# Patient Record
Sex: FEMALE | Race: WHITE | Employment: OTHER | URBAN - METROPOLITAN AREA
[De-identification: names, ages, dates, MRNs, and addresses within clinical notes are randomized per-mention and may not be internally consistent; named-entity substitution may affect disease eponyms.]

---

## 2017-01-23 ENCOUNTER — TELEPHONE (OUTPATIENT)
Dept: INTERNAL MEDICINE CLINIC | Facility: CLINIC | Age: 67
End: 2017-01-23

## 2017-01-23 ENCOUNTER — OFFICE VISIT (OUTPATIENT)
Dept: INTERNAL MEDICINE CLINIC | Facility: CLINIC | Age: 67
End: 2017-01-23

## 2017-01-23 VITALS
HEIGHT: 61.5 IN | BODY MASS INDEX: 22.55 KG/M2 | WEIGHT: 121 LBS | DIASTOLIC BLOOD PRESSURE: 72 MMHG | TEMPERATURE: 98 F | RESPIRATION RATE: 18 BRPM | SYSTOLIC BLOOD PRESSURE: 114 MMHG | HEART RATE: 69 BPM

## 2017-01-23 DIAGNOSIS — Z00.00 PHYSICAL EXAM, ROUTINE: ICD-10-CM

## 2017-01-23 DIAGNOSIS — R91.1 PULMONARY NODULE: Primary | ICD-10-CM

## 2017-01-23 PROCEDURE — G0439 PPPS, SUBSEQ VISIT: HCPCS | Performed by: INTERNAL MEDICINE

## 2017-01-23 PROCEDURE — 96160 PT-FOCUSED HLTH RISK ASSMT: CPT | Performed by: INTERNAL MEDICINE

## 2017-01-23 RX ORDER — CALCIUM CARBONATE 200(500)MG
2 TABLET,CHEWABLE ORAL EVERY EVENING
COMMUNITY
End: 2021-12-06

## 2017-01-26 ENCOUNTER — HOSPITAL ENCOUNTER (OUTPATIENT)
Dept: CT IMAGING | Age: 67
Discharge: HOME | End: 2017-01-26
Attending: INTERNAL MEDICINE
Payer: MEDICARE

## 2017-01-26 DIAGNOSIS — R91.1 PULMONARY NODULE: ICD-10-CM

## 2017-01-26 PROCEDURE — 71250 CT THORAX DX C-: CPT

## 2017-01-27 ENCOUNTER — TELEPHONE (OUTPATIENT)
Dept: INTERNAL MEDICINE CLINIC | Facility: CLINIC | Age: 67
End: 2017-01-27

## 2017-01-29 NOTE — PROGRESS NOTES
HPI:    Patient ID: Yassine Iglesias is a 78year old female.     HPI    Review of Systems         Current Outpatient Prescriptions:  NON FORMULARY dgl licorice tablet, 2 tablets once a day Disp:  Rfl:    NON FORMULARY D limonene 1000mg every other da

## 2017-01-29 NOTE — PROGRESS NOTES
HPI:   Lacie Blackmon is a 78year old female who presents for a Medicare Initial Annual Wellness visit (Once after 12 month Medicare anniversery) .     Patient reports that overall she has been feeling well, she underwent EGD and found to have jayshree tablet by mouth daily. alprazolam 0.25 MG Oral Tab Take 0.25 mg by mouth nightly as needed for Sleep. Probiotic Product (PROBIOTIC DAILY OR) Take 1 tablet by mouth daily.    Calcium Carbonate (CALCIO DEL MAR) 1250 MG Oral Tab Take 1 tablet by mouth corrine inappropriate interaction and psychiatric symptoms       EXAM:   /72 mmHg  Pulse 69  Temp(Src) 97.7 °F (36.5 °C) (Oral)  Resp 18  Ht 5' 1.5\" (1.562 m)  Wt 121 lb (54.885 kg)  BMI 22.50 kg/m2 Estimated body mass index is 22.5 kg/(m^2) as calculated f appears well hydrated alert and responsive no acute distress noted  Head/Face: normocephalic  Eyes/Vision: pupils are equal, round and reactive to light conjunctiva and lids are normal extraocular motion is intact  Ears/Audiometry: tympanic membranes are n regular physical activities, patient will be due for Pneumovax 23 in October of this year    Pulmonary nodule stable will do follow-up CT scan of the chest,  -     CT CHEST (WO) (CPT=71250);  Future    Mixed hyperlipidemia patient refused statin    Number o Hearing Problems?: No    Vision Problems? : No    Difficulty walking?: No    Difficulty dressing or bathing?: No    Problems with daily activities? : No    Memory Problems?: No      Fall/Risk Assessment          Have you fallen in the last 12 months?: 0- 02/29/2016 157*        EKG - w/ Initial Preventative Physical Exam only, or if medically necessary Electrocardiogram date     Colorectal Cancer Screening      Colonoscopy Screen every 10 years Colonoscopy,10 Years due on 06/11/2025 Update [facility] Persistent     Medications (ACE/ARB, digoxin diuretics, anticonvulsants.)    Potassium  Annually POTASSIUM (mmol/L)   Date Value   11/05/2016 4.2     POTASSIUM (P) (mmol/L)   Date Value   02/29/2016 3.9    No flowsheet data found.     Creatinine  Annually C

## 2017-01-31 NOTE — TELEPHONE ENCOUNTER
Entered by Hansa Hernandez MD at 1/29/2017  7:45 PM      Read by Aidan To at 1/30/2017  9:24 AM     Stable nodules on CT scan, nothing new, I recommend to have another CT chest in one year after 2 years if it is not changed we can stop doing t

## 2017-03-06 ENCOUNTER — OFFICE VISIT (OUTPATIENT)
Dept: INTERNAL MEDICINE CLINIC | Facility: CLINIC | Age: 67
End: 2017-03-06

## 2017-03-06 VITALS
RESPIRATION RATE: 16 BRPM | WEIGHT: 124.63 LBS | DIASTOLIC BLOOD PRESSURE: 68 MMHG | HEART RATE: 78 BPM | SYSTOLIC BLOOD PRESSURE: 120 MMHG | BODY MASS INDEX: 23.23 KG/M2 | HEIGHT: 61.5 IN

## 2017-03-06 DIAGNOSIS — M65.4 TENDINITIS, DE QUERVAIN'S: Primary | ICD-10-CM

## 2017-03-06 PROCEDURE — 99213 OFFICE O/P EST LOW 20 MIN: CPT | Performed by: INTERNAL MEDICINE

## 2017-03-06 PROCEDURE — G0463 HOSPITAL OUTPT CLINIC VISIT: HCPCS | Performed by: INTERNAL MEDICINE

## 2017-03-06 RX ORDER — CELECOXIB 200 MG/1
200 CAPSULE ORAL DAILY
Qty: 30 CAPSULE | Refills: 3 | Status: SHIPPED | OUTPATIENT
Start: 2017-03-06 | End: 2017-08-30

## 2017-03-07 NOTE — PROGRESS NOTES
HPI:    Patient ID: Clover Winslow is a 78year old female. HPI Comments: She does a lot of computer work. She was doing a lot with the mouse. The next day she had pain in her wrist and the base of her thumb. She saw the chiropractor.   He did Head: Normocephalic and atraumatic. Eyes: EOM are normal.   Pulmonary/Chest: Effort normal. No respiratory distress. Musculoskeletal:        Right hand: She exhibits decreased range of motion and tenderness.         Hands:  +finkelsteins test  Negative

## 2017-03-23 PROBLEM — M65.4 DE QUERVAIN'S TENOSYNOVITIS, RIGHT: Status: ACTIVE | Noted: 2017-03-23

## 2017-03-23 PROBLEM — M25.531 RIGHT WRIST PAIN: Status: ACTIVE | Noted: 2017-03-23

## 2017-03-23 PROBLEM — M18.11 PRIMARY OSTEOARTHRITIS OF FIRST CARPOMETACARPAL JOINT OF RIGHT HAND: Status: ACTIVE | Noted: 2017-03-23

## 2017-03-29 NOTE — TELEPHONE ENCOUNTER
Current Outpatient Prescriptions:  alprazolam 0.25 MG Oral Tab Take 0.25 mg by mouth nightly as needed for Sleep.  Disp:  Rfl:      Pt states first time Dr Jerome Bennett will be prescribing for her  Deetta Lipps she takes if needed when flying- has trip next week  Aware

## 2017-03-29 NOTE — TELEPHONE ENCOUNTER
Refill Protocol Appointment Criteria  · Appointment scheduled in the past 6 months or in the next 3 months  Recent Visits       Provider Department Primary Dx    3 weeks ago Davis Hopkins MD [address] [facility], [place] Tendinitis, [place]

## 2017-03-30 RX ORDER — ALPRAZOLAM 0.25 MG/1
0.25 TABLET ORAL NIGHTLY PRN
Qty: 30 TABLET | Refills: 0 | OUTPATIENT
Start: 2017-03-30 | End: 2017-08-30

## 2017-05-23 ENCOUNTER — TELEPHONE (OUTPATIENT)
Dept: INTERNAL MEDICINE CLINIC | Facility: CLINIC | Age: 67
End: 2017-05-23

## 2017-05-23 ENCOUNTER — PATIENT MESSAGE (OUTPATIENT)
Dept: INTERNAL MEDICINE CLINIC | Facility: CLINIC | Age: 67
End: 2017-05-23

## 2017-05-27 ENCOUNTER — TELEPHONE (OUTPATIENT)
Dept: INTERNAL MEDICINE CLINIC | Facility: CLINIC | Age: 67
End: 2017-05-27

## 2017-05-27 DIAGNOSIS — Z01.419 GYNECOLOGIC EXAM NORMAL: Primary | ICD-10-CM

## 2017-05-27 NOTE — TELEPHONE ENCOUNTER
Charisse Coley      To     [facility] Clinical Staff      Sent     5/23/2017  1:30 PM            I have gotten an appointment with Dr. Kalie Flores for a gynecological exam on Monday, June 5.  I will need a referral from Dr. Handy Clemens before my appointm

## 2017-05-27 NOTE — TELEPHONE ENCOUNTER
From: Max Buerger  To: Deborah Delcid MD  Sent: 5/23/2017 1:30 PM CDT  Subject: Other    I have gotten an appointment with Dr. Meghan Stephens for a gynecological exam on Monday, June 5.  I will need a referral from Dr. Jordyn Klein before my appointment

## 2017-05-27 NOTE — TELEPHONE ENCOUNTER
----- Message from [address] [place] [address] [address] Generic sent at 5/23/2017  1:30 PM CDT -----  Regarding: Other  Contact: [phone]  I have gotten an appointment with Dr. Marlene Choi for a gynecological exam on Monday, June 5.  I will need a referral from Dr. Joycelyn Dela Cruz before

## 2017-06-05 ENCOUNTER — OFFICE VISIT (OUTPATIENT)
Dept: OBGYN CLINIC | Facility: CLINIC | Age: 67
End: 2017-06-05

## 2017-06-05 VITALS
DIASTOLIC BLOOD PRESSURE: 79 MMHG | SYSTOLIC BLOOD PRESSURE: 151 MMHG | WEIGHT: 124 LBS | HEIGHT: 61 IN | HEART RATE: 64 BPM | BODY MASS INDEX: 23.41 KG/M2

## 2017-06-05 DIAGNOSIS — Z01.419 ENCOUNTER FOR GYNECOLOGICAL EXAMINATION WITHOUT ABNORMAL FINDING: Primary | ICD-10-CM

## 2017-06-05 DIAGNOSIS — Z12.31 ENCOUNTER FOR SCREENING MAMMOGRAM FOR BREAST CANCER: ICD-10-CM

## 2017-06-05 PROCEDURE — 99397 PER PM REEVAL EST PAT 65+ YR: CPT | Performed by: OBSTETRICS & GYNECOLOGY

## 2017-06-05 PROCEDURE — G0101 CA SCREEN;PELVIC/BREAST EXAM: HCPCS | Performed by: OBSTETRICS & GYNECOLOGY

## 2017-06-05 NOTE — PROGRESS NOTES
Well Woman Exam    HPI:  The patient is a 64yo female who presents for annual exam. She does describe mild vulvar itching which resolves with \"natural\" solutions. She is not sexually active. She denies any abnormal discharge or odor.  She denies vaginal b hyperlipidemia   • Lipids Brother      hyperlipidemia   • Diabetes Other      family h/o   • Cancer Brother      pancreatic   • Diabetes Sister        MEDICATIONS:    Current outpatient prescriptions:   •  ALPRAZolam 0.25 MG Oral Tab, Take 1 tablet (0. rhythm   Lungs: clear to ascultation bilaterally   Lymphatic:no abnormal supraclavicular or axillary adenopathy is noted  Breast: normal without palpable masses, tenderness, asymmetry, nipple discharge, nipple retraction or skin changes  Abdomen:  soft, no

## 2017-08-30 ENCOUNTER — OFFICE VISIT (OUTPATIENT)
Dept: INTERNAL MEDICINE CLINIC | Facility: CLINIC | Age: 67
End: 2017-08-30

## 2017-08-30 VITALS
BODY MASS INDEX: 24 KG/M2 | WEIGHT: 126 LBS | DIASTOLIC BLOOD PRESSURE: 76 MMHG | HEART RATE: 56 BPM | SYSTOLIC BLOOD PRESSURE: 145 MMHG

## 2017-08-30 DIAGNOSIS — R10.13 EPIGASTRIC PAIN: Primary | ICD-10-CM

## 2017-08-30 DIAGNOSIS — M65.4 TENDINITIS, DE QUERVAIN'S: ICD-10-CM

## 2017-08-30 DIAGNOSIS — E78.00 PURE HYPERCHOLESTEROLEMIA: ICD-10-CM

## 2017-08-30 DIAGNOSIS — K21.00 GASTROESOPHAGEAL REFLUX DISEASE WITH ESOPHAGITIS: ICD-10-CM

## 2017-08-30 PROCEDURE — 99214 OFFICE O/P EST MOD 30 MIN: CPT | Performed by: INTERNAL MEDICINE

## 2017-08-30 PROCEDURE — G0463 HOSPITAL OUTPT CLINIC VISIT: HCPCS | Performed by: INTERNAL MEDICINE

## 2017-08-30 RX ORDER — ALPRAZOLAM 0.25 MG/1
0.25 TABLET ORAL NIGHTLY PRN
Qty: 30 TABLET | Refills: 3 | Status: SHIPPED | OUTPATIENT
Start: 2017-08-30 | End: 2018-05-02

## 2017-08-30 RX ORDER — NICOTINE POLACRILEX 4 MG/1
1 GUM, CHEWING ORAL DAILY
COMMUNITY
End: 2017-11-13

## 2017-08-30 RX ORDER — FAMOTIDINE 40 MG/1
40 TABLET, FILM COATED ORAL NIGHTLY PRN
Qty: 90 TABLET | Refills: 1 | Status: SHIPPED | OUTPATIENT
Start: 2017-08-30 | End: 2017-09-11

## 2017-08-31 NOTE — PROGRESS NOTES
HPI:    Patient ID: Levy Castillo is a 75year old female. Presents for evaluation of the GERD symptoms, follow-up wrist pain.     HPI   Patient was treated for right wrist pain tendinitis with steroid injection by Dr. Jewel Erwni several months ago, she r Prescriptions:  Omeprazole 20 MG Oral Tab EC Take 1 tablet by mouth daily. Disp:  Rfl:    RaNITidine HCl (ZANTAC 75 OR) Take 1 tablet by mouth daily.  Disp:  Rfl:    ALPRAZolam 0.25 MG Oral Tab Take 1 tablet (0.25 mg total) by mouth nightly as needed for Sl night,  start Pepcid 40 mg daily for at least 6 weeks, if it is helpful may continue for a while if it not to report will restart omeprazolefor  Several months,   will check labs  Plan: CBC WITH DIFFERENTIAL WITH PLATELET, COMP         METABOLIC PANEL (14)

## 2017-09-01 ENCOUNTER — LAB ENCOUNTER (OUTPATIENT)
Dept: LAB | Age: 67
End: 2017-09-01
Attending: INTERNAL MEDICINE
Payer: MEDICARE

## 2017-09-01 DIAGNOSIS — E78.00 PURE HYPERCHOLESTEROLEMIA: ICD-10-CM

## 2017-09-01 DIAGNOSIS — R10.13 EPIGASTRIC PAIN: ICD-10-CM

## 2017-09-01 LAB
ALBUMIN SERPL BCP-MCNC: 4 G/DL (ref 3.5–4.8)
ALBUMIN/GLOB SERPL: 1.4 {RATIO} (ref 1–2)
ALP SERPL-CCNC: 60 U/L (ref 32–100)
ALT SERPL-CCNC: 20 U/L (ref 14–54)
ANION GAP SERPL CALC-SCNC: 6 MMOL/L (ref 0–18)
AST SERPL-CCNC: 25 U/L (ref 15–41)
BASOPHILS # BLD: 0.1 K/UL (ref 0–0.2)
BASOPHILS NFR BLD: 1 %
BILIRUB SERPL-MCNC: 1.2 MG/DL (ref 0.3–1.2)
BUN SERPL-MCNC: 13 MG/DL (ref 8–20)
BUN/CREAT SERPL: 16.3 (ref 10–20)
CALCIUM SERPL-MCNC: 9.2 MG/DL (ref 8.5–10.5)
CHLORIDE SERPL-SCNC: 108 MMOL/L (ref 95–110)
CHOLEST SERPL-MCNC: 221 MG/DL (ref 110–200)
CO2 SERPL-SCNC: 27 MMOL/L (ref 22–32)
CREAT SERPL-MCNC: 0.8 MG/DL (ref 0.5–1.5)
EOSINOPHIL # BLD: 0.1 K/UL (ref 0–0.7)
EOSINOPHIL NFR BLD: 3 %
ERYTHROCYTE [DISTWIDTH] IN BLOOD BY AUTOMATED COUNT: 12.9 % (ref 11–15)
GLOBULIN PLAS-MCNC: 2.9 G/DL (ref 2.5–3.7)
GLUCOSE SERPL-MCNC: 97 MG/DL (ref 70–99)
HCT VFR BLD AUTO: 39.9 % (ref 35–48)
HDLC SERPL-MCNC: 82 MG/DL
HGB BLD-MCNC: 13.4 G/DL (ref 12–16)
LDLC SERPL CALC-MCNC: 125 MG/DL (ref 0–99)
LYMPHOCYTES # BLD: 1.7 K/UL (ref 1–4)
LYMPHOCYTES NFR BLD: 36 %
MCH RBC QN AUTO: 31.9 PG (ref 27–32)
MCHC RBC AUTO-ENTMCNC: 33.6 G/DL (ref 32–37)
MCV RBC AUTO: 95.1 FL (ref 80–100)
MONOCYTES # BLD: 0.5 K/UL (ref 0–1)
MONOCYTES NFR BLD: 11 %
NEUTROPHILS # BLD AUTO: 2.2 K/UL (ref 1.8–7.7)
NEUTROPHILS NFR BLD: 48 %
NONHDLC SERPL-MCNC: 139 MG/DL
OSMOLALITY UR CALC.SUM OF ELEC: 292 MOSM/KG (ref 275–295)
PLATELET # BLD AUTO: 222 K/UL (ref 140–400)
PMV BLD AUTO: 9.7 FL (ref 7.4–10.3)
POTASSIUM SERPL-SCNC: 4.4 MMOL/L (ref 3.3–5.1)
PROT SERPL-MCNC: 6.9 G/DL (ref 5.9–8.4)
RBC # BLD AUTO: 4.2 M/UL (ref 3.7–5.4)
SODIUM SERPL-SCNC: 141 MMOL/L (ref 136–144)
TRIGL SERPL-MCNC: 70 MG/DL (ref 1–149)
WBC # BLD AUTO: 4.6 K/UL (ref 4–11)

## 2017-09-01 PROCEDURE — 85025 COMPLETE CBC W/AUTO DIFF WBC: CPT

## 2017-09-01 PROCEDURE — 80053 COMPREHEN METABOLIC PANEL: CPT

## 2017-09-01 PROCEDURE — 80061 LIPID PANEL: CPT

## 2017-09-01 PROCEDURE — 36415 COLL VENOUS BLD VENIPUNCTURE: CPT

## 2017-09-05 ENCOUNTER — PATIENT MESSAGE (OUTPATIENT)
Dept: INTERNAL MEDICINE CLINIC | Facility: CLINIC | Age: 67
End: 2017-09-05

## 2017-09-09 NOTE — TELEPHONE ENCOUNTER
From: Jasbir Shen  To: Rizwana Aguilar MD  Sent: 9/5/2017 10:36 AM CDT  Subject: Visit Follow-up Question    Dr. Michelle Willett,  At my last visit 8/30 you prescribed 40 mg Prevacid for my Achilles Piper, particularly the nighttime reflux.  I have taken it 6 days

## 2017-09-11 ENCOUNTER — TELEPHONE (OUTPATIENT)
Dept: INTERNAL MEDICINE CLINIC | Facility: CLINIC | Age: 67
End: 2017-09-11

## 2017-09-11 RX ORDER — NICOTINE POLACRILEX 4 MG/1
GUM, CHEWING ORAL
Qty: 90 TABLET | Refills: 1 | Status: SHIPPED | OUTPATIENT
Start: 2017-09-11 | End: 2017-11-13

## 2017-09-11 NOTE — TELEPHONE ENCOUNTER
Dr. Gage Luis, patient returning your call from Saturday and states she will be available all day today on her cell: [phone]. Please advise.

## 2017-09-13 NOTE — TELEPHONE ENCOUNTER
Spoke to patient on 9/11/2017, she will take omeprazole 20 mg daily and will discontinue Pepcid because medication is not working as here to report in couple weeks how she feels

## 2017-09-22 ENCOUNTER — TELEPHONE (OUTPATIENT)
Dept: OTHER | Age: 67
End: 2017-09-22

## 2017-09-22 ENCOUNTER — PATIENT MESSAGE (OUTPATIENT)
Dept: GASTROENTEROLOGY | Facility: CLINIC | Age: 67
End: 2017-09-22

## 2017-09-22 DIAGNOSIS — K21.9 GASTROESOPHAGEAL REFLUX DISEASE, ESOPHAGITIS PRESENCE NOT SPECIFIED: Primary | ICD-10-CM

## 2017-09-22 NOTE — TELEPHONE ENCOUNTER
Dr Headley Case; Patient asking if you recommend increasing dosage for omeprazole. Please advise. patient has been on omeprazole 20mg daily. She has been using for past 10 days. Helps during the day, but worse at night. She still feels night reflux.

## 2017-09-22 NOTE — TELEPHONE ENCOUNTER
Spoke to patient, advised to increase omeprazole to 20 mg twice a day for 7-10 days and report, also refer patient back to gastroenterology Dr. Kathya Christy to discuss her problem

## 2017-09-26 NOTE — TELEPHONE ENCOUNTER
Spoke with the patient regarding her GERD symptoms. She is avoiding caffeine. She still has some alcohol. She had taken the second dose of omeprazole before bedtime. I advised her to take a second dose half hour before dinner.   I advised her to elevate

## 2017-09-26 NOTE — TELEPHONE ENCOUNTER
Hi Dr. Fany Donaldson,     Please review pt's message below and advise. Thank you.      LOV: 10/4/16  PP: Dr. Adan Fontaine

## 2017-09-26 NOTE — TELEPHONE ENCOUNTER
From: Merdis Leventhal  To: Gianni Wynn MD  Sent: 9/22/2017 10:46 AM CDT  Subject: Non-Urgent Medical Question    I visited my primary care doctor recently for problems with the Clementina Luis you diagnosed last October.  I initially took Omeprazole,

## 2017-09-30 ENCOUNTER — TELEPHONE (OUTPATIENT)
Dept: INTERNAL MEDICINE CLINIC | Facility: CLINIC | Age: 67
End: 2017-09-30

## 2017-09-30 NOTE — TELEPHONE ENCOUNTER
Please advise on pt's comment below regarding mammogram:       Reason: To address the following health maintenance concerns. Mammogram,1 Yr      Comments:   Is it necessary to have a mammogram every year stage 75? I had one last year.

## 2017-10-03 ENCOUNTER — PATIENT MESSAGE (OUTPATIENT)
Dept: INTERNAL MEDICINE CLINIC | Facility: CLINIC | Age: 67
End: 2017-10-03

## 2017-10-07 NOTE — TELEPHONE ENCOUNTER
From: Verner Harness  To: David Owusu MD  Sent: 10/3/2017 4:13 PM CDT  Subject: Other    Hi, I sent an email earlier asking for a referral for a mammogram from Dr. Beata Cid. Then I remembered by gynecologist Dr. David Montes De Oca put in a referral for me in June.

## 2017-10-07 NOTE — TELEPHONE ENCOUNTER
FYI...mammo ordered placed by Dr Bobby Emanuel on 6-5-17    Future Appointments  Date Time Provider [place] Roman   10/17/2017 10:00 AM ARELI GOLDSMITH [facility] [place]

## 2017-10-13 ENCOUNTER — NURSE ONLY (OUTPATIENT)
Dept: INTERNAL MEDICINE CLINIC | Facility: CLINIC | Age: 67
End: 2017-10-13

## 2017-10-13 DIAGNOSIS — Z23 NEED FOR INFLUENZA VACCINATION: Primary | ICD-10-CM

## 2017-10-13 PROCEDURE — G0008 ADMIN INFLUENZA VIRUS VAC: HCPCS | Performed by: INTERNAL MEDICINE

## 2017-10-13 PROCEDURE — 90653 IIV ADJUVANT VACCINE IM: CPT | Performed by: INTERNAL MEDICINE

## 2017-10-17 ENCOUNTER — HOSPITAL ENCOUNTER (OUTPATIENT)
Dept: MAMMOGRAPHY | Age: 67
Discharge: HOME | End: 2017-10-17
Attending: OBSTETRICS & GYNECOLOGY
Payer: MEDICARE

## 2017-10-17 DIAGNOSIS — Z12.31 ENCOUNTER FOR SCREENING MAMMOGRAM FOR BREAST CANCER: ICD-10-CM

## 2017-10-17 PROCEDURE — 77067 SCR MAMMO BI INCL CAD: CPT | Performed by: OBSTETRICS & GYNECOLOGY

## 2017-10-27 ENCOUNTER — NURSE ONLY (OUTPATIENT)
Dept: INTERNAL MEDICINE CLINIC | Facility: CLINIC | Age: 67
End: 2017-10-27

## 2017-10-27 DIAGNOSIS — Z23 NEED FOR VACCINATION AGAINST STREPTOCOCCUS PNEUMONIAE: Primary | ICD-10-CM

## 2017-10-27 PROCEDURE — 90732 PPSV23 VACC 2 YRS+ SUBQ/IM: CPT | Performed by: INTERNAL MEDICINE

## 2017-10-27 PROCEDURE — G0009 ADMIN PNEUMOCOCCAL VACCINE: HCPCS | Performed by: INTERNAL MEDICINE

## 2017-10-27 NOTE — PROGRESS NOTES
Pt. Is present for pneumonia 23 vaccine  Verified pt. Name ,, medication. Pt. tolerated injection well.

## 2017-11-02 ENCOUNTER — PATIENT MESSAGE (OUTPATIENT)
Dept: GASTROENTEROLOGY | Facility: CLINIC | Age: 67
End: 2017-11-02

## 2017-11-03 NOTE — TELEPHONE ENCOUNTER
Please advise regarding pt question below. Can I add Gaviscon to my omemprazole regimen to see if I get greater relief, particularly with the night reflux?  Thank you

## 2017-11-03 NOTE — TELEPHONE ENCOUNTER
From: Narinder Marie  To: Giovanny Huang MD  Sent: 11/2/2017  6:40 AM CDT  Subject: Non-Urgent Medical Question    Doctor has been treating me for GERD, but my symptoms seem more consistent with Laryngopharyngeal Reflux, since I have no burn

## 2017-11-13 ENCOUNTER — OFFICE VISIT (OUTPATIENT)
Dept: INTERNAL MEDICINE CLINIC | Facility: CLINIC | Age: 67
End: 2017-11-13

## 2017-11-13 VITALS
WEIGHT: 124 LBS | HEART RATE: 65 BPM | BODY MASS INDEX: 23 KG/M2 | DIASTOLIC BLOOD PRESSURE: 77 MMHG | SYSTOLIC BLOOD PRESSURE: 143 MMHG

## 2017-11-13 DIAGNOSIS — J04.0 LARYNGITIS: ICD-10-CM

## 2017-11-13 DIAGNOSIS — K21.00 GASTROESOPHAGEAL REFLUX DISEASE WITH ESOPHAGITIS: ICD-10-CM

## 2017-11-13 DIAGNOSIS — E04.9 THYROID ENLARGED: Primary | ICD-10-CM

## 2017-11-13 PROCEDURE — G0463 HOSPITAL OUTPT CLINIC VISIT: HCPCS | Performed by: INTERNAL MEDICINE

## 2017-11-13 PROCEDURE — 99214 OFFICE O/P EST MOD 30 MIN: CPT | Performed by: INTERNAL MEDICINE

## 2017-11-13 RX ORDER — FAMOTIDINE 40 MG/1
40 TABLET, FILM COATED ORAL DAILY
COMMUNITY
End: 2018-05-02

## 2017-11-13 RX ORDER — LEVOCETIRIZINE DIHYDROCHLORIDE 5 MG/1
5 TABLET, FILM COATED ORAL EVERY EVENING
Qty: 90 TABLET | Refills: 0 | Status: SHIPPED | OUTPATIENT
Start: 2017-11-13 | End: 2017-11-15

## 2017-11-13 RX ORDER — PANTOPRAZOLE SODIUM 40 MG/1
40 TABLET, DELAYED RELEASE ORAL
Qty: 90 TABLET | Refills: 0 | Status: SHIPPED | OUTPATIENT
Start: 2017-11-13 | End: 2017-11-13

## 2017-11-13 RX ORDER — PANTOPRAZOLE SODIUM 40 MG/1
40 TABLET, DELAYED RELEASE ORAL
Qty: 90 TABLET | Refills: 0 | Status: SHIPPED | OUTPATIENT
Start: 2017-11-13 | End: 2018-02-02

## 2017-11-13 RX ORDER — LEVOCETIRIZINE DIHYDROCHLORIDE 5 MG/1
5 TABLET, FILM COATED ORAL EVERY EVENING
Qty: 90 TABLET | Refills: 0 | Status: SHIPPED | OUTPATIENT
Start: 2017-11-13 | End: 2017-11-13

## 2017-11-14 NOTE — PROGRESS NOTES
HPI:    Patient ID: sAher Manjarrez is a 75year old female. HPI  Patient reports that recently she has seen the dentist, we usually does neck exams, and she was advised that she may have enlarged thyroid.   Patient states that she is bothered by silent (PROBIOTIC DAILY OR) Take 1 tablet by mouth daily. Disp:  Rfl:    Diclofenac 5% in Liposome cream Apply 2 pump topically 4 (four) times daily.  Disp: 100 g Rfl: 0   NON FORMULARY dgl licorice tablet, 2 tablets once a day Disp:  Rfl:      Allergies:No Known Dihydrochloride (XYZAL) 5 MG Oral Tab 90 tablet 0      Sig: Take 1 tablet (5 mg total) by mouth every evening.            Imaging & Referrals:  ENT - INTERNAL  US THYROID (ACW=[id number])         UO#[id number]

## 2017-11-15 ENCOUNTER — OFFICE VISIT (OUTPATIENT)
Dept: OTOLARYNGOLOGY | Facility: CLINIC | Age: 67
End: 2017-11-15

## 2017-11-15 VITALS
DIASTOLIC BLOOD PRESSURE: 70 MMHG | SYSTOLIC BLOOD PRESSURE: 121 MMHG | BODY MASS INDEX: 22.82 KG/M2 | HEART RATE: 63 BPM | HEIGHT: 62 IN | WEIGHT: 124 LBS

## 2017-11-15 DIAGNOSIS — R49.0 HOARSENESS: Primary | ICD-10-CM

## 2017-11-15 DIAGNOSIS — Z91.09 ENVIRONMENTAL ALLERGIES: ICD-10-CM

## 2017-11-15 PROCEDURE — 99204 OFFICE O/P NEW MOD 45 MIN: CPT | Performed by: OTOLARYNGOLOGY

## 2017-11-15 PROCEDURE — G0463 HOSPITAL OUTPT CLINIC VISIT: HCPCS | Performed by: OTOLARYNGOLOGY

## 2017-11-15 PROCEDURE — 31575 DIAGNOSTIC LARYNGOSCOPY: CPT | Performed by: OTOLARYNGOLOGY

## 2017-11-15 NOTE — PROGRESS NOTES
Gela Hartmann is a 75year old female. Patient presents with:  Consult: Hx of Horseness & LPR. Referred here today by Dr. Severo Cuevas for Enlarged Thyroid. Pt also has complaints of dry cough, post nasal drip and other sinus issues.        HISTORY OF PRESENT 0.00      Years: 0.00        Smokeless tobacco: Never Used                        Alcohol use: Yes           2.5 oz/week       Glasses of wine: 5 per week       Comment: wine - 3 glasses weekly    Drug use:  No              Sexual activity: Yes Easy bleeding and easy bruising.            PHYSICAL EXAM    /70   Pulse 63   Ht 5' 2\" (1.575 m)   Wt 124 lb (56.2 kg)   BMI 22.68 kg/m²        Constitutional Normal Overall appearance - Normal.   Psychiatric Normal Orientation - Oriented to time, pl free of masses and ml were patent. Oropharynx was then examined and the patient has a normal tongue base and lingual tonsils. Epiglottis was  true and false cords were arytenoids and pyriform sinuses were visualized.      Anormalities noted: arytenoid aidan

## 2017-11-28 ENCOUNTER — TELEPHONE (OUTPATIENT)
Dept: CASE MANAGEMENT | Age: 67
End: 2017-11-28

## 2017-11-29 ENCOUNTER — NURSE TRIAGE (OUTPATIENT)
Dept: OTHER | Age: 67
End: 2017-11-29

## 2017-11-29 NOTE — TELEPHONE ENCOUNTER
Action Requested: Summary for Provider     []  Critical Lab, Recommendations Needed  [] Need Additional Advice  [x]   FYI    []   Need Orders  [] Need Medications Sent to Pharmacy  []  Other     SUMMARY: pt called states she is in [place] for the holid

## 2017-12-18 ENCOUNTER — LAB ENCOUNTER (OUTPATIENT)
Dept: LAB | Age: 67
End: 2017-12-18
Attending: OTOLARYNGOLOGY
Payer: MEDICARE

## 2017-12-18 DIAGNOSIS — Z91.09 ENVIRONMENTAL ALLERGIES: ICD-10-CM

## 2017-12-18 PROCEDURE — 86003 ALLG SPEC IGE CRUDE XTRC EA: CPT

## 2017-12-18 PROCEDURE — 36415 COLL VENOUS BLD VENIPUNCTURE: CPT

## 2017-12-18 PROCEDURE — 82785 ASSAY OF IGE: CPT

## 2017-12-20 ENCOUNTER — TELEPHONE (OUTPATIENT)
Dept: OTOLARYNGOLOGY | Facility: CLINIC | Age: 67
End: 2017-12-20

## 2017-12-20 NOTE — TELEPHONE ENCOUNTER
Pt saw allergy lab results on mychart, pt requesting to speak to RN re: results. Pls call thank you.

## 2017-12-20 NOTE — TELEPHONE ENCOUNTER
Please see lab results. Spoke to pt   Notes Recorded by Nj Mckeon RN on 12/20/2017 at 3:50 PM CST  Informed pt test results and Dr. Steven Taveras message to take OTC antihistamine and see Dr. Donald Dorsey if not better.  verbalized understanding.  ------    Note

## 2018-02-02 ENCOUNTER — OFFICE VISIT (OUTPATIENT)
Dept: INTERNAL MEDICINE CLINIC | Facility: CLINIC | Age: 68
End: 2018-02-02

## 2018-02-02 VITALS
HEART RATE: 67 BPM | WEIGHT: 117 LBS | TEMPERATURE: 97 F | DIASTOLIC BLOOD PRESSURE: 75 MMHG | BODY MASS INDEX: 21.81 KG/M2 | SYSTOLIC BLOOD PRESSURE: 127 MMHG | HEIGHT: 61.6 IN | RESPIRATION RATE: 18 BRPM

## 2018-02-02 DIAGNOSIS — K21.00 GASTROESOPHAGEAL REFLUX DISEASE WITH ESOPHAGITIS: ICD-10-CM

## 2018-02-02 DIAGNOSIS — Z00.00 PHYSICAL EXAM, ANNUAL: Primary | ICD-10-CM

## 2018-02-02 DIAGNOSIS — R91.8 PULMONARY NODULES: ICD-10-CM

## 2018-02-02 PROCEDURE — 96160 PT-FOCUSED HLTH RISK ASSMT: CPT | Performed by: INTERNAL MEDICINE

## 2018-02-02 PROCEDURE — G0439 PPPS, SUBSEQ VISIT: HCPCS | Performed by: INTERNAL MEDICINE

## 2018-02-03 PROBLEM — M25.531 RIGHT WRIST PAIN: Status: RESOLVED | Noted: 2017-03-23 | Resolved: 2018-02-03

## 2018-02-03 PROBLEM — M65.4 DE QUERVAIN'S TENOSYNOVITIS, RIGHT: Status: RESOLVED | Noted: 2017-03-23 | Resolved: 2018-02-03

## 2018-02-03 NOTE — PROGRESS NOTES
HPI:   Lizeth Mcgraw is a 75year old female who presents for a MA (Medicare Advantage) [facility] (Once per calendar year).   Patient reports that overall she has been doing well,  She has seen ENT specialist had exam of the larynx, she was treated for Patient Care Team:  Alessio Perdomo MD as PCP - General    Patient Active Problem List:     Mixed hyperlipidemia; she refused statin      Primary osteoarthritis of first carpometacarpal joint of right hand     GERD with esophagitis    Wt Readings from Last family member; Lipids in her brother and sister; Prostate Cancer in her father; renal failure in her mother. SOCIAL HISTORY:   She  reports that she has quit smoking.  She has never used smokeless tobacco. She reports that she drinks about 2.5 oz of alcoh No   I have to worry about missing the telephone ring or doorbell:  No I have trouble hearing conversations in a noisy background such as a crowded room or restaurant:  No   I get confused about where sounds come from:  Sometimes I misunderstand some words brachial, femoral, and pedal pulses normal  Abdomen: soft non-tender non-distended no organomegaly noted no masses  Skin/Hair: no unusual rashes present no abnormal bruising noted  Back/Spine: no abnormalities noted  Musculoskeletal: full ROM all extremiti trying?: 2 - No  Has your appetite been poor?: No  How does the patient maintain a good energy level?: Appropriate Exercise  How would you describe your daily physical activity?: Moderate  How would you describe your current health state?: Good  How do you data found.     Screening Mammogram      Mammogram Annually to 75, then as discussed Mammogram,1 Yr due on 10/17/2018 Update Health Maintenance if applicable     Immunizations (Update Immunization Activity if applicable)     Influenza  Covered Annually 10/1

## 2018-02-06 ENCOUNTER — HOSPITAL ENCOUNTER (OUTPATIENT)
Dept: CT IMAGING | Age: 68
Discharge: HOME | End: 2018-02-06
Attending: INTERNAL MEDICINE
Payer: MEDICARE

## 2018-02-06 ENCOUNTER — HOSPITAL ENCOUNTER (OUTPATIENT)
Dept: ULTRASOUND IMAGING | Age: 68
Discharge: HOME | End: 2018-02-06
Attending: INTERNAL MEDICINE
Payer: MEDICARE

## 2018-02-06 DIAGNOSIS — R91.8 PULMONARY NODULES: ICD-10-CM

## 2018-02-06 DIAGNOSIS — E04.9 THYROID ENLARGED: ICD-10-CM

## 2018-02-06 PROCEDURE — 76536 US EXAM OF HEAD AND NECK: CPT | Performed by: INTERNAL MEDICINE

## 2018-02-06 PROCEDURE — 71250 CT THORAX DX C-: CPT | Performed by: INTERNAL MEDICINE

## 2018-02-09 ENCOUNTER — TELEPHONE (OUTPATIENT)
Dept: INTERNAL MEDICINE CLINIC | Facility: CLINIC | Age: 68
End: 2018-02-09

## 2018-02-09 RX ORDER — LEVOCETIRIZINE DIHYDROCHLORIDE 5 MG/1
5 TABLET, FILM COATED ORAL EVERY EVENING
Qty: 90 TABLET | Refills: 1 | Status: SHIPPED | OUTPATIENT
Start: 2018-02-09 | End: 2018-05-02

## 2018-02-09 RX ORDER — PANTOPRAZOLE SODIUM 40 MG/1
40 TABLET, DELAYED RELEASE ORAL
Qty: 90 TABLET | Refills: 1 | Status: SHIPPED | OUTPATIENT
Start: 2018-02-09 | End: 2018-05-02

## 2018-02-09 NOTE — TELEPHONE ENCOUNTER
Viewed by patient in [address]      Notes Recorded by Cynthia Szymanski MD on 2/9/2018 at 12:37 PM CST  Sent via my chart    Viewed by Lina Pritchard on 2/9/2018  2:22 PM   Written by Cynthia Szymanski MD on 2/9/2018 12:38 PM   Small less than 1 cm thyroid nodul

## 2018-05-02 ENCOUNTER — OFFICE VISIT (OUTPATIENT)
Dept: OTOLARYNGOLOGY | Facility: CLINIC | Age: 68
End: 2018-05-02

## 2018-05-02 ENCOUNTER — OFFICE VISIT (OUTPATIENT)
Dept: INTERNAL MEDICINE CLINIC | Facility: CLINIC | Age: 68
End: 2018-05-02

## 2018-05-02 ENCOUNTER — TELEPHONE (OUTPATIENT)
Dept: INTERNAL MEDICINE CLINIC | Facility: CLINIC | Age: 68
End: 2018-05-02

## 2018-05-02 VITALS
HEART RATE: 77 BPM | TEMPERATURE: 98 F | OXYGEN SATURATION: 96 % | WEIGHT: 115 LBS | DIASTOLIC BLOOD PRESSURE: 71 MMHG | RESPIRATION RATE: 18 BRPM | SYSTOLIC BLOOD PRESSURE: 120 MMHG | BODY MASS INDEX: 21 KG/M2

## 2018-05-02 VITALS
TEMPERATURE: 98 F | WEIGHT: 115 LBS | BODY MASS INDEX: 21.16 KG/M2 | SYSTOLIC BLOOD PRESSURE: 100 MMHG | DIASTOLIC BLOOD PRESSURE: 67 MMHG | HEIGHT: 62 IN

## 2018-05-02 DIAGNOSIS — J04.0 LARYNGITIS: Primary | ICD-10-CM

## 2018-05-02 DIAGNOSIS — J02.9 SORE THROAT: ICD-10-CM

## 2018-05-02 DIAGNOSIS — R05.9 COUGH: ICD-10-CM

## 2018-05-02 DIAGNOSIS — K21.9 GASTROESOPHAGEAL REFLUX DISEASE, ESOPHAGITIS PRESENCE NOT SPECIFIED: Primary | ICD-10-CM

## 2018-05-02 PROCEDURE — 99213 OFFICE O/P EST LOW 20 MIN: CPT | Performed by: OTOLARYNGOLOGY

## 2018-05-02 PROCEDURE — 99214 OFFICE O/P EST MOD 30 MIN: CPT | Performed by: INTERNAL MEDICINE

## 2018-05-02 PROCEDURE — G0463 HOSPITAL OUTPT CLINIC VISIT: HCPCS | Performed by: OTOLARYNGOLOGY

## 2018-05-02 PROCEDURE — 31575 DIAGNOSTIC LARYNGOSCOPY: CPT | Performed by: OTOLARYNGOLOGY

## 2018-05-02 RX ORDER — ALPRAZOLAM 0.25 MG/1
0.25 TABLET ORAL NIGHTLY PRN
Qty: 30 TABLET | Refills: 3 | OUTPATIENT
Start: 2018-05-02 | End: 2018-10-23

## 2018-05-02 RX ORDER — ESCITALOPRAM OXALATE 5 MG/1
5 TABLET ORAL DAILY
Qty: 90 TABLET | Refills: 0 | Status: SHIPPED | OUTPATIENT
Start: 2018-05-02 | End: 2018-08-22

## 2018-05-02 NOTE — PROGRESS NOTES
Gela Hartmann is a 70year old female. Patient presents with:   Follow - Up: sore throat, hoarseness for 3 months, LOV 11/17      HISTORY OF PRESENT ILLNESS  11/15/2017  Patient presents for evaluation of hoarseness, phlegm accumulation, throat clearing together with him. Her children are grown. She denies any food sticking but feels that she has to swallow a lot to get the food down she her voice she feels is fine but she has a lot of discomfort in her throat.   Social History    Marital status:  Negative Fatigue, fever and weight loss. ENMT Negative Drooling. Eyes Negative Blurred vision and vision changes. Respiratory Negative Dyspnea and wheezing. Cardio Negative Chest pain, irregular heartbeat/palpitations and syncope.    GI Negative Abd PROCEDURE: Fiberoptic laryngoscopy performed with topical lidocaine and oxymetazoline. After discussing indication's and potential side effects. The patient stated that they understood and wished for me to proceed.  Topical pontocaine and neosynephrin

## 2018-05-02 NOTE — TELEPHONE ENCOUNTER
ALPRAZolam 0.25 MG Oral Tab 30 tablet 3 5/2/2018    Sig :  Take 1 tablet (0.25 mg total) by mouth nightly as needed for Sleep. Route:   Oral       Rx called in to pharmacy.

## 2018-05-03 NOTE — PROGRESS NOTES
HPI:    Patient ID: Misti Stephen is a 70year old female.   Presents for follow-up on multiple conditions    HPI  Patient reports that lately she has been bothered by constant sore throat, states that she does not get feeling of acid coming up, she will Coughing, Runny nose, Tightness in                            Throat   /71 (BP Location: Right arm, Patient Position: Sitting, Cuff Size: adult)   Pulse 77   Temp 98 °F (36.7 °C) (Oral)   Resp 18   Wt 115 lb (52.2 kg)   SpO2 96%   BMI 21.31 kg/m² ALPRAZolam 0.25 MG Oral Tab 30 tablet 3      Sig: Take 1 tablet (0.25 mg total) by mouth nightly as needed for Sleep.            Imaging & Referrals:  ENT - INTERNAL  PULMONARY - INTERNAL         DC#[id number]

## 2018-05-14 ENCOUNTER — PATIENT MESSAGE (OUTPATIENT)
Dept: OTOLARYNGOLOGY | Facility: CLINIC | Age: 68
End: 2018-05-14

## 2018-05-14 NOTE — TELEPHONE ENCOUNTER
From: Nba Castaneda  To: Jackie Zavala MD  Sent: 5/14/2018 1:03 PM CDT  Subject: Visit Follow-up Question    Hello,   This is the second message I am sending following up to my visit with Dr. Pedro Gonzales on May 3.  I was a bit confused as to the diagnosis af

## 2018-05-14 NOTE — TELEPHONE ENCOUNTER
Lesli Del Cid MD   Em Ent Clinical Staff 1 hour ago (4:18 PM)      Inform the patient that I thought perhaps her symptoms were stress related.  Since she was not any better on the acid blocker I did not recommend continuing this.  If she wishes she could

## 2018-05-22 ENCOUNTER — TELEPHONE (OUTPATIENT)
Dept: INTERNAL MEDICINE CLINIC | Facility: CLINIC | Age: 68
End: 2018-05-22

## 2018-05-22 NOTE — TELEPHONE ENCOUNTER
Please approve his if possible patient requests to see Dr. Fernando Faust at [facility], let  Pt  And me know if it is  possible

## 2018-05-25 NOTE — TELEPHONE ENCOUNTER
Unfortunately, Dr Lenka Frank is not under pt's contracted plan. If pt would like a second opinion, she would need to see an ENT through [address] [place] [address] or [facility].

## 2018-05-29 NOTE — TELEPHONE ENCOUNTER
SPoke with patient infomred per managed care notes. Pt. Requested to speak with [facility] department ,given phone [phone]. Pt. Verbalized understanding.

## 2018-06-01 ENCOUNTER — TELEPHONE (OUTPATIENT)
Dept: INTERNAL MEDICINE CLINIC | Facility: CLINIC | Age: 68
End: 2018-06-01

## 2018-06-01 NOTE — TELEPHONE ENCOUNTER
Hi Anderson Cole and Delio Ramirez,    Please see message from patient's insurance and advise,     Jovisid Knight is not a contracted provider with [facility]. If her care can not be done in-network patient should be referred to [facility] or [facility]. Please advise.     Thank you,

## 2018-06-04 ENCOUNTER — PATIENT MESSAGE (OUTPATIENT)
Dept: INTERNAL MEDICINE CLINIC | Facility: CLINIC | Age: 68
End: 2018-06-04

## 2018-06-05 ENCOUNTER — TELEPHONE (OUTPATIENT)
Dept: INTERNAL MEDICINE CLINIC | Facility: CLINIC | Age: 68
End: 2018-06-05

## 2018-06-05 DIAGNOSIS — R53.83 OTHER FATIGUE: Primary | ICD-10-CM

## 2018-06-05 DIAGNOSIS — E55.9 VITAMIN D DEFICIENCY: ICD-10-CM

## 2018-06-05 DIAGNOSIS — E53.8 VITAMIN B12 DEFICIENCY: ICD-10-CM

## 2018-06-05 NOTE — TELEPHONE ENCOUNTER
Spoke with patient (name and  verified), reviewed information, patient verbalized understanding and agrees with plan.   Relayed Dr Veronica Vann message patient will get fasting labs done this week

## 2018-06-05 NOTE — TELEPHONE ENCOUNTER
From: Jasbir Shen  To: Rizwana Aguilar MD  Sent: 6/4/2018 11:49 AM CDT  Subject: Referral Request    Hi,  I am wondering if Doctor Michelle Leach can order some lab work for me?  I'm feeling run down and would like to get blood work done to check on iron, thyro

## 2018-06-05 NOTE — TELEPHONE ENCOUNTER
Action Requested: Summary for Provider     []  Critical Lab, Recommendations Needed  [x] Need Additional Advice  []   FYI    [x]   Need Orders  [] Need Medications Sent to Pharmacy  []  Other     SUMMARY: Dr Adan Fontaine, see patient's [employer] message below.  I c

## 2018-06-06 ENCOUNTER — LAB ENCOUNTER (OUTPATIENT)
Dept: LAB | Age: 68
End: 2018-06-06
Attending: INTERNAL MEDICINE
Payer: MEDICARE

## 2018-06-06 DIAGNOSIS — E53.8 VITAMIN B12 DEFICIENCY: ICD-10-CM

## 2018-06-06 DIAGNOSIS — R53.83 OTHER FATIGUE: ICD-10-CM

## 2018-06-06 DIAGNOSIS — E55.9 VITAMIN D DEFICIENCY: ICD-10-CM

## 2018-06-06 PROCEDURE — 82306 VITAMIN D 25 HYDROXY: CPT

## 2018-06-06 PROCEDURE — 84443 ASSAY THYROID STIM HORMONE: CPT

## 2018-06-06 PROCEDURE — 36415 COLL VENOUS BLD VENIPUNCTURE: CPT

## 2018-06-06 PROCEDURE — 80053 COMPREHEN METABOLIC PANEL: CPT

## 2018-06-06 PROCEDURE — 85025 COMPLETE CBC W/AUTO DIFF WBC: CPT

## 2018-06-06 PROCEDURE — 82728 ASSAY OF FERRITIN: CPT

## 2018-06-06 PROCEDURE — 82607 VITAMIN B-12: CPT

## 2018-06-08 ENCOUNTER — OFFICE VISIT (OUTPATIENT)
Dept: PULMONOLOGY | Facility: CLINIC | Age: 68
End: 2018-06-08

## 2018-06-08 VITALS
BODY MASS INDEX: 21.42 KG/M2 | HEART RATE: 71 BPM | SYSTOLIC BLOOD PRESSURE: 126 MMHG | RESPIRATION RATE: 18 BRPM | WEIGHT: 116.38 LBS | OXYGEN SATURATION: 99 % | HEIGHT: 62 IN | DIASTOLIC BLOOD PRESSURE: 72 MMHG

## 2018-06-08 DIAGNOSIS — R09.82 POST-NASAL DRIP: ICD-10-CM

## 2018-06-08 DIAGNOSIS — R05.9 COUGH: Primary | ICD-10-CM

## 2018-06-08 DIAGNOSIS — K21.9 LARYNGOPHARYNGEAL REFLUX (LPR): ICD-10-CM

## 2018-06-08 PROCEDURE — G0463 HOSPITAL OUTPT CLINIC VISIT: HCPCS | Performed by: INTERNAL MEDICINE

## 2018-06-08 PROCEDURE — 99214 OFFICE O/P EST MOD 30 MIN: CPT | Performed by: INTERNAL MEDICINE

## 2018-06-08 RX ORDER — IPRATROPIUM BROMIDE 21 UG/1
2 SPRAY, METERED NASAL 2 TIMES DAILY
Qty: 1 BOTTLE | Refills: 2 | Status: SHIPPED | OUTPATIENT
Start: 2018-06-08 | End: 2018-10-23

## 2018-06-08 NOTE — PROGRESS NOTES
HPI:    Patient ID: Ursula Torrez is a 70year old female.     HPI  Chronic dry cough and occasional wheezes/ mainly upper airway   No sputum or hemoptysis   ++ GERD and acid reflex   ++ post nasal drip   Couldn't tolerate PPI / followed by GI   Po ASSESSMENT/PLAN:   Cough  (primary encounter diagnosis)  Post-nasal drip  Laryngopharyngeal reflux (lpr)      1- chronic Dry cough secondary to :     - LPR ( laryngopharyngeal reflux ) secondary to GERD   - post nasal drip syndrome     No significant h/o

## 2018-06-09 ENCOUNTER — TELEPHONE (OUTPATIENT)
Dept: INTERNAL MEDICINE CLINIC | Facility: CLINIC | Age: 68
End: 2018-06-09

## 2018-06-09 RX ORDER — ERGOCALCIFEROL (VITAMIN D2) 1250 MCG
50000 CAPSULE ORAL WEEKLY
Qty: 12 CAPSULE | Refills: 1 | Status: SHIPPED | OUTPATIENT
Start: 2018-06-09 | End: 2018-10-23

## 2018-06-11 ENCOUNTER — HOSPITAL ENCOUNTER (OUTPATIENT)
Dept: RESPIRATORY THERAPY | Facility: HOSPITAL | Age: 68
Discharge: HOME | End: 2018-06-11
Attending: INTERNAL MEDICINE
Payer: MEDICARE

## 2018-06-11 DIAGNOSIS — R05.9 COUGH: ICD-10-CM

## 2018-06-11 PROCEDURE — 94729 DIFFUSING CAPACITY: CPT | Performed by: INTERNAL MEDICINE

## 2018-06-11 PROCEDURE — 94726 PLETHYSMOGRAPHY LUNG VOLUMES: CPT | Performed by: INTERNAL MEDICINE

## 2018-06-11 PROCEDURE — 94060 EVALUATION OF WHEEZING: CPT | Performed by: INTERNAL MEDICINE

## 2018-06-13 NOTE — ADDENDUM NOTE
Encounter addended by: Marleni Rowe DO on: 6/13/2018 12:36 PM<BR>    Actions taken: Sign clinical note, Charge Capture section accepted

## 2018-06-13 NOTE — PROCEDURES
Pulmonary Function Test     Verna Noguera Patient Status:  Outpatient    1950 MRN [id number]   Date of Exam 18 PCP Teri Cannon MD           Spirometry   FEV1:1.78 85%  FVC:2.63 99%  FEV1/FVC:0.68    Lung Volume   T.08 115%  RV

## 2018-06-20 ENCOUNTER — TELEPHONE (OUTPATIENT)
Dept: PULMONOLOGY | Facility: CLINIC | Age: 68
End: 2018-06-20

## 2018-06-20 NOTE — TELEPHONE ENCOUNTER
Notified pt of Dr. Deirdre Kendall orders. Explained no additional orders given, but may schedule her for an earlier appt if she wishes. Pt stts she will be out of town until August. Sched pt on 8/3 2:45 pm [place]. She was given appt time, location, & parking.  Pt vo

## 2018-06-25 ENCOUNTER — TELEPHONE (OUTPATIENT)
Dept: OTHER | Age: 68
End: 2018-06-25

## 2018-06-26 ENCOUNTER — TELEPHONE (OUTPATIENT)
Dept: INTERNAL MEDICINE CLINIC | Facility: CLINIC | Age: 68
End: 2018-06-26

## 2018-06-26 DIAGNOSIS — E55.9 VITAMIN D DEFICIENCY: Primary | ICD-10-CM

## 2018-06-26 NOTE — TELEPHONE ENCOUNTER
Spoke with patient and relayed NK message below--patient verbalizes understanding and agreement. No further questions/concerns at this time.

## 2018-08-03 ENCOUNTER — OFFICE VISIT (OUTPATIENT)
Dept: PULMONOLOGY | Facility: CLINIC | Age: 68
End: 2018-08-03

## 2018-08-03 VITALS
WEIGHT: 114 LBS | RESPIRATION RATE: 18 BRPM | OXYGEN SATURATION: 100 % | SYSTOLIC BLOOD PRESSURE: 144 MMHG | DIASTOLIC BLOOD PRESSURE: 75 MMHG | BODY MASS INDEX: 21.52 KG/M2 | HEART RATE: 65 BPM | HEIGHT: 61 IN

## 2018-08-03 DIAGNOSIS — R05.9 COUGH: Primary | ICD-10-CM

## 2018-08-03 PROCEDURE — G0463 HOSPITAL OUTPT CLINIC VISIT: HCPCS | Performed by: INTERNAL MEDICINE

## 2018-08-03 PROCEDURE — 99212 OFFICE O/P EST SF 10 MIN: CPT | Performed by: INTERNAL MEDICINE

## 2018-08-03 NOTE — PROGRESS NOTES
HPI:    Patient ID: Marvin Contreras is a 70year old female. HPI  Cough is getting better with LPR therapy   No sob or wheezes   Other ROS are negative   Review of Systems   HENT: Negative for congestion.     Respiratory: Negative for chest tightn                        Meds This Visit:  No prescriptions requested or ordered in this encounter    Imaging & Referrals:  None       TP#[id number]

## 2018-08-22 ENCOUNTER — OFFICE VISIT (OUTPATIENT)
Dept: INTERNAL MEDICINE CLINIC | Facility: CLINIC | Age: 68
End: 2018-08-22

## 2018-08-22 VITALS
BODY MASS INDEX: 21.18 KG/M2 | HEIGHT: 61 IN | DIASTOLIC BLOOD PRESSURE: 72 MMHG | HEART RATE: 73 BPM | RESPIRATION RATE: 18 BRPM | TEMPERATURE: 97 F | SYSTOLIC BLOOD PRESSURE: 112 MMHG | WEIGHT: 112.19 LBS

## 2018-08-22 DIAGNOSIS — K21.9 LPRD (LARYNGOPHARYNGEAL REFLUX DISEASE): Primary | ICD-10-CM

## 2018-08-22 PROCEDURE — 99213 OFFICE O/P EST LOW 20 MIN: CPT | Performed by: INTERNAL MEDICINE

## 2018-08-22 PROCEDURE — G0463 HOSPITAL OUTPT CLINIC VISIT: HCPCS | Performed by: INTERNAL MEDICINE

## 2018-08-23 NOTE — PROGRESS NOTES
HPI:    Patient ID: Justine Lopez is a 70year old female. Presents for follow-up of GERD    HPI  Patient underwent extensive workup to evaluate recurrent reflux, but states the mouth.   She has seen ENT specialist at [facility] who tevin Bromide (ATROVENT) 0.03 % Nasal Solution 2 sprays by Nasal route 2 (two) times daily.  Disp: 1 Bottle Rfl: 2     Allergies:  Dust Mites              Coughing, Runny nose, Tightness in                            Throat   /72 (BP Location: Left arm, Buster Ceballos

## 2018-08-25 ENCOUNTER — TELEPHONE (OUTPATIENT)
Dept: INTERNAL MEDICINE CLINIC | Facility: CLINIC | Age: 68
End: 2018-08-25

## 2018-08-25 NOTE — TELEPHONE ENCOUNTER
Fax from [facility] authorization needed for     •  Esomeprazole Magnesium 20 MG Oral Capsule Delayed Release, Take 1 capsule (20 mg total) by mouth every morning before breakfast., Disp: 90 capsule, Rfl: 0    Plan does not cover this

## 2018-08-27 NOTE — TELEPHONE ENCOUNTER
PA for Esomeprazole Magnesium 20 mg cap completed with [employer] via CMM response time 3-5 business days KEY [id number].

## 2018-08-29 NOTE — TELEPHONE ENCOUNTER
Received fax from [facility] [place] not required for this medication. The patient is able to get the requested drug for a quantity up to 1 capsule per day. This drug is covered at a tier 2 copayment.

## 2018-09-28 ENCOUNTER — TELEPHONE (OUTPATIENT)
Dept: FAMILY MEDICINE CLINIC | Facility: CLINIC | Age: 68
End: 2018-09-28

## 2018-09-28 ENCOUNTER — PATIENT MESSAGE (OUTPATIENT)
Dept: INTERNAL MEDICINE CLINIC | Facility: CLINIC | Age: 68
End: 2018-09-28

## 2018-09-28 NOTE — TELEPHONE ENCOUNTER
Flu shot request added to the appointment information.     From: Luís Oscar  To: Blas Le MD  Sent: 9/28/2018 12:35 PM CDT  Subject: Other    I am coming in to see Dr. Hansel Haas on Tuesday, Oct. 23. Can I please also get a flu shot at that ti

## 2018-09-28 NOTE — TELEPHONE ENCOUNTER
Pt is calling want to inform Dr Magdy Coffey that she had Double the dosage on medication Esomeprazole Magnesium 20 MG Oral

## 2018-10-03 ENCOUNTER — TELEPHONE (OUTPATIENT)
Dept: INTERNAL MEDICINE CLINIC | Facility: CLINIC | Age: 68
End: 2018-10-03

## 2018-10-03 DIAGNOSIS — Z12.31 BREAST CANCER SCREENING BY MAMMOGRAM: Primary | ICD-10-CM

## 2018-10-16 ENCOUNTER — TELEPHONE (OUTPATIENT)
Dept: INTERNAL MEDICINE CLINIC | Facility: CLINIC | Age: 68
End: 2018-10-16

## 2018-10-16 ENCOUNTER — APPOINTMENT (OUTPATIENT)
Dept: LAB | Age: 68
End: 2018-10-16
Attending: INTERNAL MEDICINE
Payer: MEDICARE

## 2018-10-16 DIAGNOSIS — E55.9 VITAMIN D DEFICIENCY: ICD-10-CM

## 2018-10-16 DIAGNOSIS — E78.2 MIXED HYPERLIPIDEMIA: Primary | ICD-10-CM

## 2018-10-16 DIAGNOSIS — E78.2 MIXED HYPERLIPIDEMIA: ICD-10-CM

## 2018-10-16 PROCEDURE — 80061 LIPID PANEL: CPT

## 2018-10-16 PROCEDURE — 36415 COLL VENOUS BLD VENIPUNCTURE: CPT

## 2018-10-16 PROCEDURE — 80048 BASIC METABOLIC PNL TOTAL CA: CPT

## 2018-10-16 PROCEDURE — 82306 VITAMIN D 25 HYDROXY: CPT

## 2018-10-16 NOTE — TELEPHONE ENCOUNTER
Received call from SigifredoTriboldYany, pt. Is looking for a cholesterol order aside from the bmp and vit d ordered in June. Pt. Is coming on Oct 23 for f/u and would like to convert  The f/u visit for medicare advantage visit instead please. Advise.       Per Spring`s brittnee

## 2018-10-19 ENCOUNTER — HOSPITAL ENCOUNTER (OUTPATIENT)
Dept: MAMMOGRAPHY | Age: 68
Discharge: HOME | End: 2018-10-19
Attending: INTERNAL MEDICINE
Payer: MEDICARE

## 2018-10-19 DIAGNOSIS — Z12.31 BREAST CANCER SCREENING BY MAMMOGRAM: ICD-10-CM

## 2018-10-19 PROCEDURE — 77067 SCR MAMMO BI INCL CAD: CPT | Performed by: INTERNAL MEDICINE

## 2018-10-19 PROCEDURE — 77063 BREAST TOMOSYNTHESIS BI: CPT | Performed by: INTERNAL MEDICINE

## 2018-10-23 ENCOUNTER — OFFICE VISIT (OUTPATIENT)
Dept: INTERNAL MEDICINE CLINIC | Facility: CLINIC | Age: 68
End: 2018-10-23

## 2018-10-23 VITALS
TEMPERATURE: 98 F | RESPIRATION RATE: 18 BRPM | WEIGHT: 111 LBS | SYSTOLIC BLOOD PRESSURE: 115 MMHG | HEIGHT: 61.75 IN | HEART RATE: 73 BPM | BODY MASS INDEX: 20.43 KG/M2 | DIASTOLIC BLOOD PRESSURE: 70 MMHG

## 2018-10-23 DIAGNOSIS — M18.11 PRIMARY OSTEOARTHRITIS OF FIRST CARPOMETACARPAL JOINT OF RIGHT HAND: ICD-10-CM

## 2018-10-23 DIAGNOSIS — Z23 NEED FOR IMMUNIZATION AGAINST INFLUENZA: Primary | ICD-10-CM

## 2018-10-23 DIAGNOSIS — K21.9 LPRD (LARYNGOPHARYNGEAL REFLUX DISEASE): ICD-10-CM

## 2018-10-23 DIAGNOSIS — K21.00 GERD WITH ESOPHAGITIS: ICD-10-CM

## 2018-10-23 DIAGNOSIS — Z00.00 PHYSICAL EXAM, ANNUAL: ICD-10-CM

## 2018-10-23 DIAGNOSIS — E78.2 MIXED HYPERLIPIDEMIA: ICD-10-CM

## 2018-10-23 PROCEDURE — G0008 ADMIN INFLUENZA VIRUS VAC: HCPCS | Performed by: INTERNAL MEDICINE

## 2018-10-23 PROCEDURE — 99214 OFFICE O/P EST MOD 30 MIN: CPT | Performed by: INTERNAL MEDICINE

## 2018-10-23 PROCEDURE — 90686 IIV4 VACC NO PRSV 0.5 ML IM: CPT | Performed by: INTERNAL MEDICINE

## 2018-10-23 RX ORDER — ALPRAZOLAM 0.25 MG/1
0.25 TABLET ORAL NIGHTLY PRN
Qty: 30 TABLET | Refills: 3 | Status: SHIPPED | OUTPATIENT
Start: 2018-10-23 | End: 2019-04-10

## 2018-10-27 ENCOUNTER — PATIENT MESSAGE (OUTPATIENT)
Dept: GASTROENTEROLOGY | Facility: CLINIC | Age: 68
End: 2018-10-27

## 2018-10-28 NOTE — PROGRESS NOTES
HPI:   Liliana Hess is a 70year old female who presents for a MA (Medicare Advantage) [facility] [place][note 2] (Once per calendar year).     But she restarted taking omeprazole, she was doing fair without medications for a while, after her trip on vacation and LPRD (laryngopharyngeal reflux disease)    Wt Readings from Last 3 Encounters:  10/23/18 : 111 lb (50.3 kg)  08/22/18 : 112 lb 3.2 oz (50.9 kg)  08/03/18 : 114 lb (51.7 kg)     Last Cholesterol Labs:   Lab Results   Component Value Date    CHOLEST 251 (H) quit smoking. she has never used smokeless tobacco. She reports that she drinks about 2.5 oz of alcohol per week. She reports that she does not use drugs.      REVIEW OF SYSTEMS:     ROS:     Constitutional:  Negative for decreased activity, fever, irritabi conversations in a noisy background such as a crowded room or restaurant:  No   I get confused about where sounds come from:  No I misunderstand some words in a sentence and need to ask people to repeat themselves:  No   I especially have trouble [place] masses  Skin/Hair: no unusual rashes present no abnormal bruising noted  Back/Spine: no abnormalities noted  Musculoskeletal: full ROM all extremities good strength  no deformities  Extremities: no edema, cyanosis, or clubbing  Neurological: exam appropria 0.25 MG Oral Tab; Take 1 tablet (0.25 mg total) by mouth nightly as needed for Sleep. -     Cancel: FLU VACCINE ADJUVANT IM         Diet assessment: good     PLAN:  The patient indicates understanding of these issues and agrees to the plan.   Reinforced he Dexa Scan:   XR DEXA BONE DENSITOMETRY (CPT=77080) 10/28/2015    No flowsheet data found.     Pap and Pelvic      Pap: Every 3 yrs age 21-65 or Pap+HPV every 5 yrs age 33-67, age 72 and older at high risk Pap Smear,3 Years due on 01/22/2017 [facility] exam, annual  Achilles Piper with esophagitis  Primary osteoarthritis of first carpometacarpal joint of right hand  Mixed hyperlipidemia; she refused statin  Lprd (laryngopharyngeal reflux disease)    Orders Placed This Encounter      Flulaval 6 months and older 0.5

## 2018-10-29 NOTE — TELEPHONE ENCOUNTER
From: Rebekah Johnson  To: Vibha Carlson MD  Sent: 10/27/2018 8:37 AM CDT  Subject: Test Results Question    Is it possible to get the test results from my October 12, 2016 endoscopy put into my MyChart record?  If not, can a copy pleas

## 2018-10-29 NOTE — TELEPHONE ENCOUNTER
Please send a copy of procedure report and pathology report from 10/12/2016 to the patient at her home address.

## 2019-01-06 ENCOUNTER — PATIENT MESSAGE (OUTPATIENT)
Dept: INTERNAL MEDICINE CLINIC | Facility: CLINIC | Age: 69
End: 2019-01-06

## 2019-01-07 NOTE — TELEPHONE ENCOUNTER
From: Liliana Hess  To: Lázaro Christianson MD  Sent: 1/6/2019 5:22 PM CST  Subject: Visit Follow-up Question    I am having a CAC imaging heart test this Friday, Jan. 11, at [facility]).  I told doctor Juan Ramon Wood about this at my last office visit but

## 2019-01-10 ENCOUNTER — APPOINTMENT (OUTPATIENT)
Dept: GENERAL RADIOLOGY | Facility: HOSPITAL | Age: 69
End: 2019-01-10
Payer: MEDICARE

## 2019-01-10 ENCOUNTER — OFFICE VISIT (OUTPATIENT)
Dept: INTERNAL MEDICINE CLINIC | Facility: CLINIC | Age: 69
End: 2019-01-10
Payer: MEDICARE

## 2019-01-10 ENCOUNTER — NURSE TRIAGE (OUTPATIENT)
Dept: OTHER | Age: 69
End: 2019-01-10

## 2019-01-10 ENCOUNTER — HOSPITAL ENCOUNTER (EMERGENCY)
Facility: HOSPITAL | Age: 69
Discharge: HOME | End: 2019-01-10
Attending: EMERGENCY MEDICINE
Payer: MEDICARE

## 2019-01-10 VITALS
SYSTOLIC BLOOD PRESSURE: 150 MMHG | OXYGEN SATURATION: 100 % | HEART RATE: 65 BPM | RESPIRATION RATE: 14 BRPM | WEIGHT: 107.81 LBS | TEMPERATURE: 98 F | BODY MASS INDEX: 20 KG/M2 | DIASTOLIC BLOOD PRESSURE: 73 MMHG

## 2019-01-10 VITALS
SYSTOLIC BLOOD PRESSURE: 119 MMHG | HEART RATE: 66 BPM | HEIGHT: 61 IN | WEIGHT: 107.88 LBS | BODY MASS INDEX: 20.37 KG/M2 | DIASTOLIC BLOOD PRESSURE: 75 MMHG | TEMPERATURE: 99 F

## 2019-01-10 DIAGNOSIS — R07.89 OTHER CHEST PAIN: Primary | ICD-10-CM

## 2019-01-10 DIAGNOSIS — R07.89 CHEST PAIN, ATYPICAL: Primary | ICD-10-CM

## 2019-01-10 DIAGNOSIS — K21.9 GASTROESOPHAGEAL REFLUX DISEASE, ESOPHAGITIS PRESENCE NOT SPECIFIED: ICD-10-CM

## 2019-01-10 LAB
ANION GAP SERPL CALC-SCNC: 12 MMOL/L (ref 0–18)
BASOPHILS # BLD: 0.1 K/UL (ref 0–0.2)
BASOPHILS NFR BLD: 1 %
BUN SERPL-MCNC: 18 MG/DL (ref 8–20)
BUN/CREAT SERPL: 22 (ref 10–20)
CALCIUM SERPL-MCNC: 9.6 MG/DL (ref 8.5–10.5)
CHLORIDE SERPL-SCNC: 103 MMOL/L (ref 95–110)
CO2 SERPL-SCNC: 22 MMOL/L (ref 22–32)
CREAT SERPL-MCNC: 0.82 MG/DL (ref 0.5–1.5)
EOSINOPHIL # BLD: 0.1 K/UL (ref 0–0.7)
EOSINOPHIL NFR BLD: 1 %
ERYTHROCYTE [DISTWIDTH] IN BLOOD BY AUTOMATED COUNT: 12.9 % (ref 11–15)
GLUCOSE SERPL-MCNC: 107 MG/DL (ref 70–99)
HCT VFR BLD AUTO: 42.5 % (ref 35–48)
HGB BLD-MCNC: 14.3 G/DL (ref 12–16)
LYMPHOCYTES # BLD: 1.7 K/UL (ref 1–4)
LYMPHOCYTES NFR BLD: 28 %
MCH RBC QN AUTO: 31.4 PG (ref 27–32)
MCHC RBC AUTO-ENTMCNC: 33.6 G/DL (ref 32–37)
MCV RBC AUTO: 93.6 FL (ref 80–100)
MONOCYTES # BLD: 0.5 K/UL (ref 0–1)
MONOCYTES NFR BLD: 9 %
NEUTROPHILS # BLD AUTO: 3.7 K/UL (ref 1.8–7.7)
NEUTROPHILS NFR BLD: 61 %
OSMOLALITY UR CALC.SUM OF ELEC: 286 MOSM/KG (ref 275–295)
PLATELET # BLD AUTO: 248 K/UL (ref 140–400)
PMV BLD AUTO: 9.5 FL (ref 7.4–10.3)
POTASSIUM SERPL-SCNC: 3.5 MMOL/L (ref 3.3–5.1)
RBC # BLD AUTO: 4.54 M/UL (ref 3.7–5.4)
SODIUM SERPL-SCNC: 137 MMOL/L (ref 136–144)
TROPONIN I SERPL-MCNC: 0 NG/ML (ref ?–0.03)
WBC # BLD AUTO: 6.1 K/UL (ref 4–11)

## 2019-01-10 PROCEDURE — 85025 COMPLETE CBC W/AUTO DIFF WBC: CPT | Performed by: EMERGENCY MEDICINE

## 2019-01-10 PROCEDURE — 99285 EMERGENCY DEPT VISIT HI MDM: CPT

## 2019-01-10 PROCEDURE — 85025 COMPLETE CBC W/AUTO DIFF WBC: CPT

## 2019-01-10 PROCEDURE — 80048 BASIC METABOLIC PNL TOTAL CA: CPT | Performed by: EMERGENCY MEDICINE

## 2019-01-10 PROCEDURE — 84484 ASSAY OF TROPONIN QUANT: CPT | Performed by: EMERGENCY MEDICINE

## 2019-01-10 PROCEDURE — 36415 COLL VENOUS BLD VENIPUNCTURE: CPT

## 2019-01-10 PROCEDURE — 99213 OFFICE O/P EST LOW 20 MIN: CPT | Performed by: INTERNAL MEDICINE

## 2019-01-10 PROCEDURE — 93005 ELECTROCARDIOGRAM TRACING: CPT

## 2019-01-10 PROCEDURE — 93010 ELECTROCARDIOGRAM REPORT: CPT | Performed by: EMERGENCY MEDICINE

## 2019-01-10 PROCEDURE — G0463 HOSPITAL OUTPT CLINIC VISIT: HCPCS | Performed by: INTERNAL MEDICINE

## 2019-01-10 PROCEDURE — 80048 BASIC METABOLIC PNL TOTAL CA: CPT

## 2019-01-10 PROCEDURE — 71045 X-RAY EXAM CHEST 1 VIEW: CPT

## 2019-01-10 PROCEDURE — 84484 ASSAY OF TROPONIN QUANT: CPT

## 2019-01-10 RX ORDER — RANITIDINE 150 MG/1
150 TABLET ORAL EVERY 12 HOURS
Qty: 60 TABLET | Refills: 0 | Status: SHIPPED | OUTPATIENT
Start: 2019-01-10 | End: 2019-02-09

## 2019-01-10 NOTE — ED PROVIDER NOTES
Patient Seen in: [facility] AND [facility] Emergency Department    History   Patient presents with:  Chest Pain Angina (cardiovascular)    Stated Complaint:     HPI    68-year-old female with past medical history significant for anxiety, arthritis, anemia, rheum Other systems are as noted in HPI. Constitutional and vital signs reviewed. All other systems reviewed and negative except as noted above.     Physical Exam     ED Triage Vitals [01/10/19 1549]   BP (!) 172/83   Pulse 58   Resp 18   Temp 97.9 °F (36 RAINBOW DRAW BLUE   RAINBOW DRAW LAVENDER   RAINBOW DRAW DARK GREEN   RAINBOW DRAW LIGHT GREEN   RAINBOW DRAW GOLD   RAINBOW DRAW LAVENDER TALL (BNP)   CBC W/ DIFFERENTIAL     EKG    Rate, intervals and axes as noted on EKG Report.   Rate: 68 bpm  Rhythm:

## 2019-01-10 NOTE — PROGRESS NOTES
Yassine Iglesias is a 70year old female.   Patient presents with:  Chest Pressure      HPI:     HPI  Patient  78-year-old female history of acid reflux, anxiety here with complaints of chest discomfort on and off for last couplemo couple months, for sigmoidoscopy 05/2000   • Lipid screening 10/26/2013    per NG   • Mitral valve disorder     per NG: slighty thickened mitral valve in early 90's as per pt.  - No treatment   • Other ill-defined conditions(799.89)     per NG: \"tonsils ? 1994\"; \"hepatitis (1.549 m)   Wt 107 lb 14.4 oz (48.9 kg)   BMI 20.39 kg/m²   Physical Exam   Constitutional: She is oriented to person, place, and time. She appears well-developed and well-nourished. Neck:   No carotid bruit.    Cardiovascular: Normal rate, regular rhythm

## 2019-01-10 NOTE — ED NOTES
Chest pain for past few months. Worse yesterday than ever before. Pt states hx of gerd but this felt different the past few months. Worse when laying down and after meals. Denies SOB/fevers.

## 2019-01-10 NOTE — TELEPHONE ENCOUNTER
Pt stated she have a hx of GERD but for the last 2 weeks the chest pain was different- more of an achy feel-in center of chest toward the left side-mostly at night or after a meal-takes Tums as needed- stated last night she had to take 3 tums,later Zantac

## 2019-01-11 ENCOUNTER — HOSPITAL ENCOUNTER (OUTPATIENT)
Dept: CT IMAGING | Age: 69
Discharge: HOME | End: 2019-01-11
Attending: INTERNAL MEDICINE

## 2019-01-11 DIAGNOSIS — Z13.6 ENCOUNTER FOR LIPID SCREENING FOR CARDIOVASCULAR DISEASE: ICD-10-CM

## 2019-01-11 DIAGNOSIS — Z13.220 ENCOUNTER FOR LIPID SCREENING FOR CARDIOVASCULAR DISEASE: ICD-10-CM

## 2019-01-11 NOTE — PROGRESS NOTES
Pt seen at [facility], [facility] for [facility]:  PRELIMINARY SCORE=0  AI=504/68    Cholestec labs as follows:  IG=326  HDL=88  QMA=953  TG=67  GLUCOSE=88; fasting    All results and risk factors discussed with patient; all questions and concerns addressed.   [employer]

## 2019-01-14 ENCOUNTER — TELEPHONE (OUTPATIENT)
Dept: GASTROENTEROLOGY | Facility: CLINIC | Age: 69
End: 2019-01-14

## 2019-01-14 NOTE — PROGRESS NOTES
This pt has seen Dr. Jose Ricketts in the past so she can follow up with her in the office.    thanks

## 2019-01-14 NOTE — TELEPHONE ENCOUNTER
Forwarded to Germania ROSE as FYI--pt previously seen by Dr Femi Tijerina but wishes to see Dr Mellisa Bryson. Accepted appt with you this Thurs Jan 17, arrival 8:15am and given directions to [facility] [place]. Post ER for severe Gerd/atypical chest pain. States was cleared by cardio.  Just

## 2019-01-14 NOTE — TELEPHONE ENCOUNTER
Pt was in the ER last Thursday pt has been diagnosed with GERD and was told to follow up with dr Kelsie Echeverria sooner then first available please call

## 2019-01-15 NOTE — PROGRESS NOTES
[facility] Follow-up Visit    Deonna try.    Previously seen by an ENT at [facility] who recommended Gaviscon prior to meals and at bedtime. If breakthrough symptoms continue to occur, start Nexium x 2 months. She stopped after about 1 month and because she felt worse.     At present her medical valve disorder     per NG: slighty thickened mitral valve in early 90's as per pt. - No treatment   • Other ill-defined conditions(799.89)     per NG: \"tonsils ? 1994\"; \"hepatitis ? \"   • Panic attacks    • Pneumonia    • Pregnancy     per NG: \"child b dysuria and hematuria   SKIN:  negative for  rash  ALLERGIC/IMMUNOLOGIC:  negative for hay fever allergies  ENDOCRINE:  negative for cold intolerance and heat intolerance  MUSCULOSKELETAL:  negative for  joint stiffness and joint swelling  BEHAVIOR/PSYCH: a reflux band. Previously seen by [facility] [place] [facility] who recommended Gaviscon at mealtimes/bedtime and Nexium if Gaviscon was not successful. Again, Nexium course was limited as patient stopped taking the medication.   We discussed possible options for further ev

## 2019-01-17 ENCOUNTER — OFFICE VISIT (OUTPATIENT)
Dept: GASTROENTEROLOGY | Facility: CLINIC | Age: 69
End: 2019-01-17
Payer: MEDICARE

## 2019-01-17 VITALS
HEART RATE: 63 BPM | SYSTOLIC BLOOD PRESSURE: 123 MMHG | WEIGHT: 106.81 LBS | HEIGHT: 61 IN | DIASTOLIC BLOOD PRESSURE: 74 MMHG | BODY MASS INDEX: 20.17 KG/M2

## 2019-01-17 DIAGNOSIS — K21.9 GASTROESOPHAGEAL REFLUX DISEASE, ESOPHAGITIS PRESENCE NOT SPECIFIED: Primary | ICD-10-CM

## 2019-01-17 PROCEDURE — 99214 OFFICE O/P EST MOD 30 MIN: CPT | Performed by: NURSE PRACTITIONER

## 2019-01-17 PROCEDURE — G0463 HOSPITAL OUTPT CLINIC VISIT: HCPCS | Performed by: NURSE PRACTITIONER

## 2019-01-17 NOTE — PATIENT INSTRUCTIONS
1.  Restart Nexium 20 mg daily  2. Continue your lifestyle/dietary modifications  3.   Follow-up in the next 2-3 months either with myself or Dr. Mellisa Bryson per preference

## 2019-01-18 ENCOUNTER — HOSPITAL ENCOUNTER (OUTPATIENT)
Dept: ULTRASOUND IMAGING | Age: 69
Discharge: HOME | End: 2019-01-18
Attending: INTERNAL MEDICINE

## 2019-01-18 DIAGNOSIS — Z13.9 ENCOUNTER FOR SCREENING: ICD-10-CM

## 2019-01-18 NOTE — PROGRESS NOTES
Pt seen at [facility], [facility] for free PV screening  PRELIMINARY SCORE= carotids bilaterally-1(no narrowing), No AAA noted.   EQ=046/60  Cholestec labs as follows:  TC=  HDL=  LDL=  TG=  GLUCOSE=  All results and risk factors discussed with patient; all kinza

## 2019-01-23 ENCOUNTER — OFFICE VISIT (OUTPATIENT)
Dept: INTERNAL MEDICINE CLINIC | Facility: CLINIC | Age: 69
End: 2019-01-23
Payer: MEDICARE

## 2019-01-23 VITALS
HEART RATE: 69 BPM | BODY MASS INDEX: 20 KG/M2 | RESPIRATION RATE: 18 BRPM | DIASTOLIC BLOOD PRESSURE: 80 MMHG | WEIGHT: 108 LBS | SYSTOLIC BLOOD PRESSURE: 120 MMHG

## 2019-01-23 DIAGNOSIS — J02.9 PHARYNGITIS, UNSPECIFIED ETIOLOGY: Primary | ICD-10-CM

## 2019-01-23 DIAGNOSIS — R07.89 OTHER CHEST PAIN: ICD-10-CM

## 2019-01-23 PROCEDURE — G0463 HOSPITAL OUTPT CLINIC VISIT: HCPCS | Performed by: INTERNAL MEDICINE

## 2019-01-23 PROCEDURE — 99214 OFFICE O/P EST MOD 30 MIN: CPT | Performed by: INTERNAL MEDICINE

## 2019-01-27 NOTE — PROGRESS NOTES
HPI:    Patient ID: Vazquez Jaramillo is a 70year old female.   Presents for follow-up on chest pain, sore throat for 2 days    HPI  Patient reports that 2 days ago she started to experience severe sore throat, feels somewhat tired more than normal, she h nose, Tightness in                            Throat   /80 (BP Location: Left arm, Patient Position: Sitting, Cuff Size: adult)   Pulse 69   Resp 18   Wt 108 lb (49 kg)   BMI 20.41 kg/m²    Physical Exam    Constitutional: She is oriented to person, (VMH=99880)         OK#[id number]

## 2019-02-13 ENCOUNTER — TELEPHONE (OUTPATIENT)
Dept: GASTROENTEROLOGY | Facility: CLINIC | Age: 69
End: 2019-02-13

## 2019-02-13 NOTE — TELEPHONE ENCOUNTER
Patient would like to speak to [facility] regarding medication Omeprazole.      [facility]- Please advise

## 2019-02-14 NOTE — TELEPHONE ENCOUNTER
Nursing: LMTCB w/ asking what concerns/questions the pt has. Pt aware I am in clinic and may be not be able to come the phone until later.

## 2019-02-14 NOTE — TELEPHONE ENCOUNTER
I spoke with the patient at length. She has been taking omeprazole 20 mg daily for the last 12 days and continues to have reflux symptoms. She is concerned that her symptoms seem to be worsening.   She questions whether she should continue 20 mg daily or

## 2019-02-14 NOTE — TELEPHONE ENCOUNTER
Patient states she has a problem because she has been on PPI's before in past with no relief.  Pt went to ER and had f/u with [facility] where they both discussed being on a PPI longer to try to see if that is the reason why PPI's haven't helped in past.     Pt sta

## 2019-02-28 ENCOUNTER — HOSPITAL ENCOUNTER (OUTPATIENT)
Dept: NUCLEAR MEDICINE | Facility: HOSPITAL | Age: 69
Discharge: HOME | End: 2019-02-28
Attending: INTERNAL MEDICINE
Payer: MEDICARE

## 2019-02-28 ENCOUNTER — HOSPITAL ENCOUNTER (OUTPATIENT)
Dept: CV DIAGNOSTICS | Facility: HOSPITAL | Age: 69
Discharge: HOME | End: 2019-02-28
Attending: INTERNAL MEDICINE
Payer: MEDICARE

## 2019-02-28 DIAGNOSIS — R07.89 OTHER CHEST PAIN: ICD-10-CM

## 2019-02-28 PROCEDURE — 93016 CV STRESS TEST SUPVJ ONLY: CPT | Performed by: INTERNAL MEDICINE

## 2019-02-28 PROCEDURE — 93018 CV STRESS TEST I&R ONLY: CPT | Performed by: INTERNAL MEDICINE

## 2019-02-28 PROCEDURE — 78452 HT MUSCLE IMAGE SPECT MULT: CPT | Performed by: INTERNAL MEDICINE

## 2019-02-28 PROCEDURE — 93017 CV STRESS TEST TRACING ONLY: CPT | Performed by: INTERNAL MEDICINE

## 2019-02-28 RX ORDER — SODIUM CHLORIDE 9 MG/ML
INJECTION, SOLUTION INTRAVENOUS
Status: COMPLETED
Start: 2019-02-28 | End: 2019-02-28

## 2019-02-28 RX ORDER — OMEPRAZOLE 20 MG/1
20 CAPSULE, DELAYED RELEASE ORAL
COMMUNITY
End: 2020-02-04 | Stop reason: ALTCHOICE

## 2019-02-28 RX ADMIN — SODIUM CHLORIDE 100 ML: 9 INJECTION, SOLUTION INTRAVENOUS at 12:30:00

## 2019-02-28 NOTE — IMAGING NOTE
Patient is here for Nuclear exercise stress test. Please see stress test result for full details. ECG changes noted. Denies any chest discomfort or pain. Dr Liv Khan cardiologist go to notified. Recommended to follow up with PCP.  Also instructed to seek me

## 2019-03-04 ENCOUNTER — TELEPHONE (OUTPATIENT)
Dept: INTERNAL MEDICINE CLINIC | Facility: CLINIC | Age: 69
End: 2019-03-04

## 2019-03-04 NOTE — TELEPHONE ENCOUNTER
Pt called in requesting to have [address] refer her to a cardiologist that she has worked with.   Please advise

## 2019-03-06 ENCOUNTER — TELEPHONE (OUTPATIENT)
Dept: INTERNAL MEDICINE CLINIC | Facility: CLINIC | Age: 69
End: 2019-03-06

## 2019-03-06 DIAGNOSIS — I49.8 OTHER CARDIAC ARRHYTHMIA: Primary | ICD-10-CM

## 2019-03-06 NOTE — TELEPHONE ENCOUNTER
Spoke to patient, she will see Dr. 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

## 2019-04-10 ENCOUNTER — APPOINTMENT (OUTPATIENT)
Dept: LAB | Facility: HOSPITAL | Age: 69
End: 2019-04-10
Attending: INTERNAL MEDICINE
Payer: MEDICARE

## 2019-04-10 ENCOUNTER — OFFICE VISIT (OUTPATIENT)
Dept: GASTROENTEROLOGY | Facility: CLINIC | Age: 69
End: 2019-04-10
Payer: MEDICARE

## 2019-04-10 VITALS
WEIGHT: 108 LBS | BODY MASS INDEX: 20.13 KG/M2 | HEIGHT: 61.5 IN | SYSTOLIC BLOOD PRESSURE: 125 MMHG | DIASTOLIC BLOOD PRESSURE: 72 MMHG | HEART RATE: 70 BPM

## 2019-04-10 DIAGNOSIS — K21.9 GASTROESOPHAGEAL REFLUX DISEASE, ESOPHAGITIS PRESENCE NOT SPECIFIED: ICD-10-CM

## 2019-04-10 DIAGNOSIS — R07.89 ATYPICAL CHEST PAIN: ICD-10-CM

## 2019-04-10 DIAGNOSIS — K21.9 GASTROESOPHAGEAL REFLUX DISEASE, ESOPHAGITIS PRESENCE NOT SPECIFIED: Primary | ICD-10-CM

## 2019-04-10 PROCEDURE — 83516 IMMUNOASSAY NONANTIBODY: CPT

## 2019-04-10 PROCEDURE — 82784 ASSAY IGA/IGD/IGG/IGM EACH: CPT

## 2019-04-10 PROCEDURE — 99213 OFFICE O/P EST LOW 20 MIN: CPT | Performed by: INTERNAL MEDICINE

## 2019-04-10 PROCEDURE — 36415 COLL VENOUS BLD VENIPUNCTURE: CPT

## 2019-04-10 PROCEDURE — G0463 HOSPITAL OUTPT CLINIC VISIT: HCPCS | Performed by: INTERNAL MEDICINE

## 2019-04-10 RX ORDER — SUCRALFATE ORAL 1 G/10ML
1 SUSPENSION ORAL
Qty: 420 ML | Refills: 0 | Status: SHIPPED | OUTPATIENT
Start: 2019-04-10 | End: 2019-04-10 | Stop reason: DRUGHIGH

## 2019-04-10 RX ORDER — SUCRALFATE ORAL 1 G/10ML
1 SUSPENSION ORAL
Qty: 420 ML | Refills: 0 | Status: SHIPPED | OUTPATIENT
Start: 2019-04-10 | End: 2019-04-22

## 2019-04-10 NOTE — PATIENT INSTRUCTIONS
GERD  - omeprazole 40mg in the morning  - carafate before meals  - upper GI  XR test  - consider Fouzia evaluation  - rule out celiac

## 2019-04-10 NOTE — TELEPHONE ENCOUNTER
Pharmacy contacted, they did not get Carafate rx--when I called it in, I noticed 2 sigs. I spoke to Coleen in office. She states should read take 4 times daily before meals and at bedtime. Entered new rx and pended, sent to Coleen for sign off.  Please can

## 2019-04-10 NOTE — TELEPHONE ENCOUNTER
Pedro Leal gave us printed order for Sucralfate printed today, I called patient and she stated she would like order to be sent to [facility] in [place].      I called [facility] spoke to pharmacist Rose Bustamante she states that order was received and they are in the middle

## 2019-04-11 ENCOUNTER — TELEPHONE (OUTPATIENT)
Dept: CARDIOLOGY CLINIC | Facility: CLINIC | Age: 69
End: 2019-04-11

## 2019-04-11 ENCOUNTER — TELEPHONE (OUTPATIENT)
Dept: GASTROENTEROLOGY | Facility: CLINIC | Age: 69
End: 2019-04-11

## 2019-04-11 ENCOUNTER — OFFICE VISIT (OUTPATIENT)
Dept: CARDIOLOGY CLINIC | Facility: CLINIC | Age: 69
End: 2019-04-11
Payer: MEDICARE

## 2019-04-11 ENCOUNTER — HOSPITAL ENCOUNTER (OUTPATIENT)
Dept: GENERAL RADIOLOGY | Facility: HOSPITAL | Age: 69
Discharge: HOME | End: 2019-04-11
Attending: INTERNAL MEDICINE
Payer: MEDICARE

## 2019-04-11 VITALS
HEART RATE: 62 BPM | OXYGEN SATURATION: 100 % | BODY MASS INDEX: 20 KG/M2 | SYSTOLIC BLOOD PRESSURE: 160 MMHG | DIASTOLIC BLOOD PRESSURE: 72 MMHG | RESPIRATION RATE: 20 BRPM | WEIGHT: 108 LBS

## 2019-04-11 DIAGNOSIS — R94.39 ABNORMAL STRESS TEST: Primary | ICD-10-CM

## 2019-04-11 DIAGNOSIS — E78.49 OTHER HYPERLIPIDEMIA: ICD-10-CM

## 2019-04-11 DIAGNOSIS — R07.89 ATYPICAL CHEST PAIN: ICD-10-CM

## 2019-04-11 DIAGNOSIS — K21.9 GASTROESOPHAGEAL REFLUX DISEASE, ESOPHAGITIS PRESENCE NOT SPECIFIED: ICD-10-CM

## 2019-04-11 DIAGNOSIS — R07.89 OTHER CHEST PAIN: ICD-10-CM

## 2019-04-11 PROCEDURE — 99204 OFFICE O/P NEW MOD 45 MIN: CPT | Performed by: INTERNAL MEDICINE

## 2019-04-11 PROCEDURE — 74246 X-RAY XM UPR GI TRC 2CNTRST: CPT | Performed by: INTERNAL MEDICINE

## 2019-04-11 PROCEDURE — G0463 HOSPITAL OUTPT CLINIC VISIT: HCPCS | Performed by: INTERNAL MEDICINE

## 2019-04-11 RX ORDER — METOPROLOL TARTRATE 50 MG/1
TABLET, FILM COATED ORAL
Qty: 2 TABLET | Refills: 0 | Status: SHIPPED | OUTPATIENT
Start: 2019-04-11 | End: 2020-02-04 | Stop reason: ALTCHOICE

## 2019-04-11 NOTE — PROGRESS NOTES
Kirk Hamilton is a 78year old female. HPI:   Patient presents with: Follow - Up    The patient is a 67-year-old female who has a history of anxiety, chronic reflux-like symptoms and laryngitis-like symptoms as well.   She is been told by her ENT t pancreatic      Social History: Social History    Tobacco Use      Smoking status: Former Smoker      Smokeless tobacco: Never Used    Alcohol use: No      Frequency: Never    Drug use: No       Medications (Active prior to today's visit):    Current Outpa She reports she has tried other PPIs without significant improvement as well. I have advised a trial of omeprazole in the morning and then Carafate throughout the day.   We also discussed proceeding ahead with upper GI series as patient is reluctant/does

## 2019-04-11 NOTE — TELEPHONE ENCOUNTER
Suspension too expensive pls send rx for tablet      carafate 1GM/10ML SUSP  Take 10ML (1G total) by mouth 4 times daily before meals and nightly at bedtime

## 2019-04-11 NOTE — H&P
Shayy Oliva is a 78year old female. HPI:   This is a pleasant 78year old female with history of chest pains PVCs elevated cholesterol and abnormal stress test she now presents for evaluation.   We are asked times daily before meals and nightly.  At bedtime Disp: 420 mL Rfl: 0     Family History:  Family History   Problem Relation Age of Onset   • Prostate Cancer Father    • Diabetes Father    • Other (renal failure) Mother         renal failure   • Lipids Sist no murmur, good distal pulse  GI: good BS's,no masses, HSM or tenderness  EXTREMITIES: no cyanosis, clubbing or edema  NEURO:no focal deficits  Psych:alert oriented x3    Assessment   ASSESSMENT AND PLAN:     1.  Abnormal stress test  This patient's abnorma MD  4/11/2019  4:58 PM

## 2019-04-11 NOTE — PATIENT INSTRUCTIONS
We will schedule cardiac CTA if approved by insurance  For cardiac CTA will need to take metoprolol 50 mg the night before in the morning of the test if heart rate greater than 60

## 2019-04-11 NOTE — TELEPHONE ENCOUNTER
Re Harika ROSE encounter. I had spoken to Dae Johansen, who will be out of office for 3 weeks--she stated at that time if suspension not acceptable we can use tablets instead, and just dissolve in a little water.  I spoke to pharmacist Nely Sarmiento and inf

## 2019-04-11 NOTE — TELEPHONE ENCOUNTER
CTA ongoing CP w/normal nuc stress test  CTA GATED CORONARY ARTERIES NO CALCIUM SCORING (CPT=75574) (Order #[id number]) on 4/11/19    Associated DX:   Other chest pain (R07.89)  Abnormal stress test (R94.39)    Indications:     Evaluate coronary arteries /

## 2019-04-12 RX ORDER — ALPRAZOLAM 0.25 MG/1
0.25 TABLET ORAL NIGHTLY PRN
Qty: 90 TABLET | Refills: 0 | OUTPATIENT
Start: 2019-04-12 | End: 2019-10-25

## 2019-04-12 NOTE — TELEPHONE ENCOUNTER
CPT 74042 CTA   S/w Garrett ONEILL  EVICORE dose this procedure code. Transferred to [facility] at [phone]    S/w Trent Gonzalez needs to go for clinical review. LOV w/MB & LOV w/NK & EKG & ED note faxed to [phone]. Case number [id number].

## 2019-04-12 NOTE — TELEPHONE ENCOUNTER
Review pended refill request as it does not fall under a protocol.     LR 10/23/18 for #30 + 3    Med pended for phone in for #90 per patient request    Please respond to pool: EM IM LMB LPN/CMA    Office staff, please phone in Rx once approved    Requested

## 2019-04-15 ENCOUNTER — TELEPHONE (OUTPATIENT)
Dept: GASTROENTEROLOGY | Facility: CLINIC | Age: 69
End: 2019-04-15

## 2019-04-15 DIAGNOSIS — R07.89 NON-CARDIAC CHEST PAIN: ICD-10-CM

## 2019-04-15 DIAGNOSIS — K21.9 GASTROESOPHAGEAL REFLUX DISEASE, ESOPHAGITIS PRESENCE NOT SPECIFIED: Primary | ICD-10-CM

## 2019-04-16 ENCOUNTER — PATIENT MESSAGE (OUTPATIENT)
Dept: GASTROENTEROLOGY | Facility: CLINIC | Age: 69
End: 2019-04-16

## 2019-04-16 NOTE — TELEPHONE ENCOUNTER
Fax received stated additional clinical information needed: exam note including PMH, surrent sings/symptoms, indication and results of prior cardiac testing. Refaxed.

## 2019-04-16 NOTE — TELEPHONE ENCOUNTER
Re-sent to Dr Jacqueline Chavarria, pt would like to speak to you. Pt saw results in [address] but has several questions. Also pt was on Carafate 4 days--stopped taking as it actually increased reflux symptoms. Is taking Omeprazole 2 capsules each AM as you directed.  Is f

## 2019-04-17 NOTE — TELEPHONE ENCOUNTER
Pt states she spoke w dr Gallo Kimbrough in regards to results and he did not go over labs or results for gluten intolerance. States shruthi is to get pt information on specialist at [facility] .  Pt would like to know if she is to continue on the following medication unt

## 2019-04-17 NOTE — TELEPHONE ENCOUNTER
The patient was contacted, results of the upper GI series were discussed with the patient. All of her questions were answered. She reports that the Carafate did not seem to help out so she discontinued this after 4 days.   She is using a Gaviscon preparat

## 2019-04-17 NOTE — TELEPHONE ENCOUNTER
Pt contacted re below: Dr Abhishek Mathis, I see you spoke to pt yesterday. Please advise on gluten testing results. I contacted Lu Coley at [facility], recommends Dr Mckeon/Dr Betancur Alert. Pt contacted, requested that I send info in [address] and she will call for appt.  Instructed

## 2019-04-18 NOTE — TELEPHONE ENCOUNTER
Pt informed of Dr Christy Ramos message. Dr Hansel Haas, I believe your message  belongs in another chart. Pt unaware of any of this. Please remove from encounter. Thanks.

## 2019-04-19 NOTE — TELEPHONE ENCOUNTER
Denial via Fax received from [facility] for [address].    Needs peer to peer at [phone]  Reference number [id number]  4/19 to [facility]

## 2019-04-22 ENCOUNTER — HOSPITAL ENCOUNTER (OUTPATIENT)
Dept: CT IMAGING | Facility: HOSPITAL | Age: 69
Discharge: HOME | End: 2019-04-22
Attending: INTERNAL MEDICINE
Payer: MEDICARE

## 2019-04-22 ENCOUNTER — MED REC SCAN ONLY (OUTPATIENT)
Dept: CARDIOLOGY CLINIC | Facility: CLINIC | Age: 69
End: 2019-04-22

## 2019-04-22 VITALS
DIASTOLIC BLOOD PRESSURE: 69 MMHG | SYSTOLIC BLOOD PRESSURE: 135 MMHG | HEART RATE: 58 BPM | WEIGHT: 107 LBS | RESPIRATION RATE: 16 BRPM | HEIGHT: 61 IN | OXYGEN SATURATION: 99 % | BODY MASS INDEX: 20.2 KG/M2

## 2019-04-22 DIAGNOSIS — R07.89 OTHER CHEST PAIN: ICD-10-CM

## 2019-04-22 DIAGNOSIS — R94.39 ABNORMAL STRESS TEST: ICD-10-CM

## 2019-04-22 PROCEDURE — 82565 ASSAY OF CREATININE: CPT

## 2019-04-22 PROCEDURE — 75574 CT ANGIO HRT W/3D IMAGE: CPT | Performed by: INTERNAL MEDICINE

## 2019-04-22 RX ORDER — NITROGLYCERIN 0.4 MG/1
TABLET SUBLINGUAL
Status: DISPENSED
Start: 2019-04-22 | End: 2019-04-22

## 2019-04-22 RX ORDER — AMOXICILLIN 250 MG
1 CAPSULE ORAL
COMMUNITY

## 2019-04-22 RX ORDER — NITROGLYCERIN 0.4 MG/1
0.4 TABLET SUBLINGUAL ONCE
Status: COMPLETED | OUTPATIENT
Start: 2019-04-22 | End: 2019-04-22

## 2019-04-22 RX ADMIN — NITROGLYCERIN 0.4 MG: 0.4 TABLET SUBLINGUAL at 09:44:00

## 2019-04-22 NOTE — IMAGING NOTE
TO RADIATION HOLDING UNIT AT 0905. HX REVIEWED. PT CONSENTED AT [address].    18 GAUGE IV STARTED AT 0929. POC TESTING COMPLETED:  GFR = >60   CREATINE = 0.8. CTA ORDERED BY DR. Jolyn Libman. PT TOOK METOPROLOL 50 MG PO THE EVENING PRIOR TO CTA.      TO CT

## 2019-04-23 ENCOUNTER — TELEPHONE (OUTPATIENT)
Dept: CARDIOLOGY CLINIC | Facility: CLINIC | Age: 69
End: 2019-04-23

## 2019-04-23 DIAGNOSIS — R00.2 PALPITATIONS: Primary | ICD-10-CM

## 2019-04-23 NOTE — TELEPHONE ENCOUNTER
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    Still has palpitations \"all the time. \"  Chest pain and discomfort: is it truly GERD  Refused statin -was told by

## 2019-04-24 ENCOUNTER — TELEPHONE (OUTPATIENT)
Dept: CARDIOLOGY CLINIC | Facility: CLINIC | Age: 69
End: 2019-04-24

## 2019-04-24 NOTE — TELEPHONE ENCOUNTER
Candance Cos from [facility] is calling with questions about pts 30 day event monitor please call thank you

## 2019-04-24 NOTE — TELEPHONE ENCOUNTER
S/w patient she has appt w/CMA 4/24 for monitor. Discussed message left on voicemail. All questions answered.

## 2019-04-24 NOTE — TELEPHONE ENCOUNTER
Call reference number [id number]   Was subjected to listen to the patient's benefits in attempt to return call. It is after business hours.  8 am to 5 pm  Will try again in am.

## 2019-04-24 NOTE — TELEPHONE ENCOUNTER
Pt indicates [facility] pt is cancelling and may cb to [facility] for another day, thanks.

## 2019-04-25 NOTE — TELEPHONE ENCOUNTER
Attempting [id number] no precert if participating provider. S/W  Светлана Schofield  Call ref [id number]  S/w Taiwo Galvan advised of no PA required,  states at this point her priority is her acid reflux. She wants to put heart monitor on hold at this.  Has lived with the

## 2019-04-25 NOTE — TELEPHONE ENCOUNTER
S/w Kyler Gilliam advised of no PA required,  states at this point her priority is her acid reflux. She wants to put heart monitor on hold at this.  Has lived with the PVCs for the last 32 years, doesn't get faint or dizzy doesn't see necessity of it right now

## 2019-06-19 ENCOUNTER — TELEPHONE (OUTPATIENT)
Dept: CARDIOLOGY CLINIC | Facility: CLINIC | Age: 69
End: 2019-06-19

## 2019-06-19 NOTE — TELEPHONE ENCOUNTER
Called patient re Event monitor ordered by dr Meghan Galeano.  Patient informed me that she is no longer experiencing palpitations and does not want monitor placed

## 2019-08-08 ENCOUNTER — TELEPHONE (OUTPATIENT)
Dept: OTHER | Age: 69
End: 2019-08-08

## 2019-08-08 NOTE — TELEPHONE ENCOUNTER
Pt stated she had her 1st dose of Shingrix yesterday and advised to call Dr if she has any side efects  Stated 2 hr after injection had fever,chills,nausea did not take anything worried would get sicker, today she has a h/a took Tylenol- called off for wor

## 2019-08-08 NOTE — TELEPHONE ENCOUNTER
Spoke to patient, she is feeling better after taking Tylenol, I advised patient that she should probably report reaction to Shingrix to , and ask for guidance, I do not have an answer about immunity after 1 injection.

## 2019-09-25 ENCOUNTER — TELEPHONE (OUTPATIENT)
Dept: INTERNAL MEDICINE CLINIC | Facility: CLINIC | Age: 69
End: 2019-09-25

## 2019-09-25 DIAGNOSIS — Z12.31 VISIT FOR SCREENING MAMMOGRAM: Primary | ICD-10-CM

## 2019-09-25 DIAGNOSIS — Z12.31 BREAST CANCER SCREENING BY MAMMOGRAM: ICD-10-CM

## 2019-10-21 ENCOUNTER — HOSPITAL ENCOUNTER (OUTPATIENT)
Dept: MAMMOGRAPHY | Age: 69
Discharge: HOME | End: 2019-10-21
Attending: INTERNAL MEDICINE
Payer: MEDICARE

## 2019-10-21 DIAGNOSIS — Z12.31 BREAST CANCER SCREENING BY MAMMOGRAM: ICD-10-CM

## 2019-10-21 PROCEDURE — 77063 BREAST TOMOSYNTHESIS BI: CPT | Performed by: INTERNAL MEDICINE

## 2019-10-21 PROCEDURE — 77067 SCR MAMMO BI INCL CAD: CPT | Performed by: INTERNAL MEDICINE

## 2019-10-25 ENCOUNTER — OFFICE VISIT (OUTPATIENT)
Dept: INTERNAL MEDICINE CLINIC | Facility: CLINIC | Age: 69
End: 2019-10-25
Payer: MEDICARE

## 2019-10-25 VITALS
HEIGHT: 61 IN | WEIGHT: 114 LBS | BODY MASS INDEX: 21.52 KG/M2 | DIASTOLIC BLOOD PRESSURE: 73 MMHG | TEMPERATURE: 98 F | RESPIRATION RATE: 18 BRPM | HEART RATE: 63 BPM | SYSTOLIC BLOOD PRESSURE: 124 MMHG

## 2019-10-25 DIAGNOSIS — E78.00 PURE HYPERCHOLESTEROLEMIA: ICD-10-CM

## 2019-10-25 DIAGNOSIS — R00.2 PALPITATIONS: ICD-10-CM

## 2019-10-25 DIAGNOSIS — K21.9 LPRD (LARYNGOPHARYNGEAL REFLUX DISEASE): ICD-10-CM

## 2019-10-25 DIAGNOSIS — M18.11 PRIMARY OSTEOARTHRITIS OF FIRST CARPOMETACARPAL JOINT OF RIGHT HAND: ICD-10-CM

## 2019-10-25 DIAGNOSIS — Z00.00 PHYSICAL EXAM, ANNUAL: Primary | ICD-10-CM

## 2019-10-25 PROBLEM — R07.89 OTHER CHEST PAIN: Status: RESOLVED | Noted: 2019-04-11 | Resolved: 2019-10-25

## 2019-10-25 PROCEDURE — G0439 PPPS, SUBSEQ VISIT: HCPCS | Performed by: INTERNAL MEDICINE

## 2019-10-25 PROCEDURE — 99397 PER PM REEVAL EST PAT 65+ YR: CPT | Performed by: INTERNAL MEDICINE

## 2019-10-25 PROCEDURE — G0008 ADMIN INFLUENZA VIRUS VAC: HCPCS | Performed by: INTERNAL MEDICINE

## 2019-10-25 PROCEDURE — 96160 PT-FOCUSED HLTH RISK ASSMT: CPT | Performed by: INTERNAL MEDICINE

## 2019-10-25 PROCEDURE — 90662 IIV NO PRSV INCREASED AG IM: CPT | Performed by: INTERNAL MEDICINE

## 2019-10-25 RX ORDER — ALPRAZOLAM 0.25 MG/1
0.25 TABLET ORAL NIGHTLY PRN
Qty: 90 TABLET | Refills: 0 | Status: SHIPPED | OUTPATIENT
Start: 2019-10-25 | End: 2019-10-25

## 2019-10-25 RX ORDER — ALPRAZOLAM 0.25 MG/1
0.25 TABLET ORAL NIGHTLY PRN
Qty: 90 TABLET | Refills: 0 | Status: SHIPPED | OUTPATIENT
Start: 2019-10-25 | End: 2020-07-10

## 2019-10-26 NOTE — PROGRESS NOTES
HPI:   Phillip Ulloa is a 78year old female who presents for a MA (Medicare Advantage) [facility] (Once per calendar year).     Patient reports that she has been feeling well last few months, heartburn she is to clear under control, she has been f as PCP - General    Patient Active Problem List:     Other hyperlipidemia     Primary osteoarthritis of first carpometacarpal joint of right hand     GERD with esophagitis     LPRD (laryngopharyngeal reflux disease)     Abnormal stress test     Other chest that she has quit smoking. She has never used smokeless tobacco. She reports that she does not drink alcohol or use drugs.      REVIEW OF SYSTEMS:   Review of Systems     ROS:     Constitutional:  Negative for decreased activity, fever, irritability and let or doorbell:  No I have trouble hearing conversations in a noisy background such as a crowded room or restaurant:  No   I get confused about where sounds come from:  Sometimes I misunderstand some words in a sentence and need to ask people to repeat themse normal  Abdomen: soft non-tender non-distended no organomegaly noted no masses  Skin/Hair: no unusual rashes present no abnormal bruising noted  Back/Spine: no abnormalities noted  Musculoskeletal: full ROM all extremities good strength  no deformities  Ex hypercholesterolemia stable, check lipids, patient refused statins in the past, she seems low risk for cardiovascular disorder  -     LIPID PANEL; Future    Other orders  -     Discontinue: ALPRAZolam 0.25 MG Oral Tab;  Take 1 tablet (0.25 mg total) by mout any previous visit. No flowsheet data found. Fecal Occult Blood Annually No results found for: FOB No flowsheet data found. Glaucoma Screening      Ophthalmology Visit Annually: Diabetics, FHx Glaucoma, AA>50, > 65 No flowsheet data found.

## 2019-10-29 ENCOUNTER — LAB ENCOUNTER (OUTPATIENT)
Dept: LAB | Age: 69
End: 2019-10-29
Attending: INTERNAL MEDICINE
Payer: MEDICARE

## 2019-10-29 DIAGNOSIS — R00.2 PALPITATIONS: ICD-10-CM

## 2019-10-29 DIAGNOSIS — E78.00 PURE HYPERCHOLESTEROLEMIA: ICD-10-CM

## 2019-10-29 PROCEDURE — 84443 ASSAY THYROID STIM HORMONE: CPT

## 2019-10-29 PROCEDURE — 80053 COMPREHEN METABOLIC PANEL: CPT

## 2019-10-29 PROCEDURE — 80061 LIPID PANEL: CPT

## 2019-10-29 PROCEDURE — 36415 COLL VENOUS BLD VENIPUNCTURE: CPT

## 2019-10-29 PROCEDURE — 85025 COMPLETE CBC W/AUTO DIFF WBC: CPT

## 2019-12-12 ENCOUNTER — OFFICE VISIT (OUTPATIENT)
Dept: DERMATOLOGY CLINIC | Facility: CLINIC | Age: 69
End: 2019-12-12
Payer: MEDICARE

## 2019-12-12 ENCOUNTER — HOSPITAL ENCOUNTER (OUTPATIENT)
Dept: BONE DENSITY | Age: 69
Discharge: HOME | End: 2019-12-12
Attending: INTERNAL MEDICINE
Payer: MEDICARE

## 2019-12-12 DIAGNOSIS — D23.60 BENIGN NEOPLASM OF SKIN OF UPPER LIMB, INCLUDING SHOULDER, UNSPECIFIED LATERALITY: ICD-10-CM

## 2019-12-12 DIAGNOSIS — D23.5 BENIGN NEOPLASM OF SKIN OF TRUNK, EXCEPT SCROTUM: ICD-10-CM

## 2019-12-12 DIAGNOSIS — L81.4 LENTIGO: ICD-10-CM

## 2019-12-12 DIAGNOSIS — D23.4 BENIGN NEOPLASM OF SCALP AND SKIN OF NECK: ICD-10-CM

## 2019-12-12 DIAGNOSIS — Z00.00 PHYSICAL EXAM, ANNUAL: ICD-10-CM

## 2019-12-12 DIAGNOSIS — D23.70 BENIGN NEOPLASM OF SKIN OF LOWER LIMB, INCLUDING HIP, UNSPECIFIED LATERALITY: ICD-10-CM

## 2019-12-12 DIAGNOSIS — L82.1 SEBORRHEIC KERATOSES: Primary | ICD-10-CM

## 2019-12-12 DIAGNOSIS — D22.9 MULTIPLE NEVI: ICD-10-CM

## 2019-12-12 DIAGNOSIS — D23.30 BENIGN NEOPLASM OF SKIN OF FACE: ICD-10-CM

## 2019-12-12 PROCEDURE — 99202 OFFICE O/P NEW SF 15 MIN: CPT | Performed by: DERMATOLOGY

## 2019-12-12 PROCEDURE — 77080 DXA BONE DENSITY AXIAL: CPT | Performed by: INTERNAL MEDICINE

## 2019-12-22 NOTE — PROGRESS NOTES
Bard Jiménez is a 78year old female.   HPI:     CC:  Patient presents with:  Moles: Patient present with mole on lrft breast -check bridge of nose - patient denies family hx of skin cancer        Allergies:  Dust Mites    HISTORY:    Past Medical Oral Chew Tab Chew 2 tablets by mouth every evening. • Probiotic Product (PROBIOTIC DAILY OR) Take 1 tablet by mouth daily.      • Metoprolol Tartrate 50 MG Oral Tab One pill night before and morning of test if HR >60 (Patient not taking: Reported on 12 Yes    Lifestyle      Physical activity:        Days per week: Not on file        Minutes per session: Not on file      Stress: Not on file    Relationships      Social connections:        Talks on phone: Not on file        Gets together: Not on file bleeding or itching pain  Patient presents with concerns above. Patient has been in their usual state of health. History, medications, allergies reviewed as noted. ROS:  Denies any other systemic complaints.   No new or changeing lesions other than reassurance given. Fibrous papule nasal sidewall right, infraorbital area observe. Lentigo right mid cheek reassurance observe. Scattered lentigines right lower cheek, arms hands back reassurance.     Waxy tan keratotic plaque at left breast reassuranc

## 2020-01-10 ENCOUNTER — NURSE ONLY (OUTPATIENT)
Dept: CARDIOLOGY CLINIC | Facility: CLINIC | Age: 70
End: 2020-01-10
Payer: MEDICARE

## 2020-01-10 DIAGNOSIS — R00.2 PALPITATIONS: ICD-10-CM

## 2020-01-10 PROCEDURE — 93270 REMOTE 30 DAY ECG REV/REPORT: CPT | Performed by: INTERNAL MEDICINE

## 2020-01-10 NOTE — PROGRESS NOTES
.Event monitor placed on patient during visit today. Instructed patient on how to place/remove device and care for the monitor. Instructed patient to notify ordering physician if symptoms worsen  during monitoring.  Patient was instructed to call Jazmine Orozco

## 2020-02-04 ENCOUNTER — OFFICE VISIT (OUTPATIENT)
Dept: INTERNAL MEDICINE CLINIC | Facility: CLINIC | Age: 70
End: 2020-02-04
Payer: MEDICARE

## 2020-02-04 VITALS
BODY MASS INDEX: 22.09 KG/M2 | HEIGHT: 61 IN | RESPIRATION RATE: 18 BRPM | HEART RATE: 70 BPM | OXYGEN SATURATION: 95 % | DIASTOLIC BLOOD PRESSURE: 76 MMHG | SYSTOLIC BLOOD PRESSURE: 119 MMHG | TEMPERATURE: 98 F | WEIGHT: 117 LBS

## 2020-02-04 DIAGNOSIS — M54.50 BILATERAL LOW BACK PAIN WITHOUT SCIATICA, UNSPECIFIED CHRONICITY: ICD-10-CM

## 2020-02-04 DIAGNOSIS — R39.9 UTI SYMPTOMS: Primary | ICD-10-CM

## 2020-02-04 LAB
APPEARANCE: CLEAR
BACTERIA UR QL AUTO: NEGATIVE /HPF
BILIRUB UR QL: NEGATIVE
BILIRUBIN: NEGATIVE
CLARITY UR: CLEAR
COLOR UR: YELLOW
GLUCOSE (URINE DIPSTICK): NEGATIVE MG/DL
GLUCOSE UR-MCNC: NEGATIVE MG/DL
HGB UR QL STRIP.AUTO: NEGATIVE
KETONES (URINE DIPSTICK): NEGATIVE MG/DL
KETONES UR-MCNC: NEGATIVE MG/DL
LEUKOCYTE ESTERASE UR QL STRIP.AUTO: NEGATIVE
LEUKOCYTES: NEGATIVE
MULTISTIX LOT#: NORMAL NUMERIC
NITRITE UR QL STRIP.AUTO: NEGATIVE
NITRITE, URINE: NEGATIVE
PH UR: 5 [PH] (ref 5–8)
PH, URINE: 5.5 (ref 4.5–8)
PROT UR-MCNC: NEGATIVE MG/DL
PROTEIN (URINE DIPSTICK): NEGATIVE MG/DL
RBC #/AREA URNS AUTO: 0 /HPF
SP GR UR STRIP: 1.02 (ref 1–1.03)
SPECIFIC GRAVITY: 1.02 (ref 1–1.03)
UROBILINOGEN UR STRIP-ACNC: <2
UROBILINOGEN,SEMI-QN: 0.2 MG/DL (ref 0–1.9)
WBC #/AREA URNS AUTO: 1 /HPF

## 2020-02-04 PROCEDURE — 99214 OFFICE O/P EST MOD 30 MIN: CPT | Performed by: INTERNAL MEDICINE

## 2020-02-04 PROCEDURE — 81002 URINALYSIS NONAUTO W/O SCOPE: CPT | Performed by: INTERNAL MEDICINE

## 2020-02-04 NOTE — PROGRESS NOTES
HPI:    Patient ID: Charisse Coley is a 78year old female.     Patient presents with:  Back Pain: Pt is having low back pain and that she think that she may have a UTI with urinary frequency-patient denies any burning with urination    Patient stat blood in stool, bowel incontinence, constipation, diarrhea, nausea and vomiting. Genitourinary: Positive for frequency.  Negative for bladder incontinence, difficulty urinating, dysuria, enuresis, flank pain, menstrual problem, urgency, vaginal bleeding, oropharyngeal erythema. Eyes: Right eye exhibits no discharge. Left eye exhibits no discharge. No scleral icterus. Neck: Neck supple. No JVD present. No thyromegaly present. Cardiovascular: Normal rate, regular rhythm and normal heart sounds.    No mu Visit:  Requested Prescriptions      No prescriptions requested or ordered in this encounter       Imaging & Referrals:  None       #[id number]

## 2020-02-05 ENCOUNTER — TELEPHONE (OUTPATIENT)
Dept: OTHER | Age: 70
End: 2020-02-05

## 2020-02-06 ENCOUNTER — TELEPHONE (OUTPATIENT)
Dept: OTHER | Age: 70
End: 2020-02-06

## 2020-02-06 NOTE — TELEPHONE ENCOUNTER
Spoke to patient, reported that UA and culture negative, she probably developing neurogenic bladder versus interstitial cystitis, she will  information about combating frequent urination in office tomorrow and will consider seeing urologist in near

## 2020-02-06 NOTE — TELEPHONE ENCOUNTER
Pt states she was seen in Tuesday 2/4/20 for frequent urination and low back pain. States [facility] and is concerned as she continues to have frequent urination and low back pain.  Pt is taking ES Tylenol twice a day for the back

## 2020-02-06 NOTE — TELEPHONE ENCOUNTER
Please advise.   ----- Message from [facility]. Mikolajczak sent at 2/5/2020  6:07 PM CST -----  Regarding: Test Results Question  Contact: [phone]  I saw Dr. Mabel Bran. on Tuesday, since Dr. Ortega Loss was unavailable, for what I thought might be a UTI.  I gave a [place]

## 2020-02-07 ENCOUNTER — TELEPHONE (OUTPATIENT)
Dept: CARDIOLOGY CLINIC | Facility: CLINIC | Age: 70
End: 2020-02-07

## 2020-02-07 NOTE — TELEPHONE ENCOUNTER
Message routed to Dr. Charles Youngblood and the provider on-call for review.      Please reply to pool: ALEXI Silva

## 2020-02-08 NOTE — TELEPHONE ENCOUNTER
Urine and urine culture are completely normal  No infection seen    Patient may cut down the fluids to see how she is doing without lots of fluids, usually 40 to 60 ounces a day is enough of all fluids per day , avoid coffee and tea with caffeine that is a

## 2020-02-08 NOTE — TELEPHONE ENCOUNTER
Spoke with patient relayed Dr. Medina Area message, patient voiced understanding and agrees with plan of care.

## 2020-02-10 ENCOUNTER — APPOINTMENT (OUTPATIENT)
Dept: CARDIOLOGY CLINIC | Facility: CLINIC | Age: 70
End: 2020-02-10
Payer: MEDICARE

## 2020-02-10 DIAGNOSIS — R00.2 PALPITATIONS: ICD-10-CM

## 2020-02-11 PROCEDURE — 93272 ECG/REVIEW INTERPRET ONLY: CPT | Performed by: INTERNAL MEDICINE

## 2020-05-18 ENCOUNTER — TELEPHONE (OUTPATIENT)
Dept: OBGYN CLINIC | Facility: CLINIC | Age: 70
End: 2020-05-18

## 2020-07-10 RX ORDER — ALPRAZOLAM 0.25 MG/1
0.25 TABLET ORAL NIGHTLY PRN
Qty: 90 TABLET | Refills: 0 | Status: SHIPPED | OUTPATIENT
Start: 2020-07-10 | End: 2020-10-20

## 2020-09-04 ENCOUNTER — OFFICE VISIT (OUTPATIENT)
Dept: OBGYN CLINIC | Facility: CLINIC | Age: 70
End: 2020-09-04
Payer: MEDICARE

## 2020-09-04 VITALS
HEIGHT: 61.4 IN | HEART RATE: 67 BPM | BODY MASS INDEX: 21.06 KG/M2 | SYSTOLIC BLOOD PRESSURE: 147 MMHG | WEIGHT: 113 LBS | DIASTOLIC BLOOD PRESSURE: 77 MMHG

## 2020-09-04 DIAGNOSIS — Z12.31 ENCOUNTER FOR SCREENING MAMMOGRAM FOR BREAST CANCER: ICD-10-CM

## 2020-09-04 DIAGNOSIS — N90.5 VULVAR ATROPHY: ICD-10-CM

## 2020-09-04 DIAGNOSIS — Z01.419 ENCOUNTER FOR GYNECOLOGICAL EXAMINATION WITHOUT ABNORMAL FINDING: Primary | ICD-10-CM

## 2020-09-04 PROCEDURE — 3008F BODY MASS INDEX DOCD: CPT | Performed by: OBSTETRICS & GYNECOLOGY

## 2020-09-04 PROCEDURE — 3078F DIAST BP <80 MM HG: CPT | Performed by: OBSTETRICS & GYNECOLOGY

## 2020-09-04 PROCEDURE — 3077F SYST BP >= 140 MM HG: CPT | Performed by: OBSTETRICS & GYNECOLOGY

## 2020-09-04 PROCEDURE — G0101 CA SCREEN;PELVIC/BREAST EXAM: HCPCS | Performed by: OBSTETRICS & GYNECOLOGY

## 2020-09-04 NOTE — PROGRESS NOTES
Well Woman Exam    HPI:  The patient is a 78yo female who presents today for annual exam. She c/o vulvar atrophy. She has some dryness and some irritation. She is not sexually active. She also complains of changes in her taste.  She states things now taste Alcohol use: No        Frequency: Never      Drug use: No      Sexual activity: Yes    Lifestyle      Physical activity:        Days per week: Not on file        Minutes per session: Not on file      Stress: Not on file    Relationships      Social connect daily., Disp: , Rfl:   •  Multiple Vitamins-Minerals (BIOTIN PLUS/CALCIUM/VIT D3) Oral Tab, Take 1 tablet by mouth., Disp: , Rfl:   •  Emollient (COLLAGEN EX), Take by mouth daily.  Collagen powder 2 tbsp , Disp: , Rfl:   •  Calcium Carbonate Antacid 500 MG lesions, no abnormal discharge  Cervix:  Normal without tenderness on motion  Uterus: normal in size, contour, position, mobility, without tenderness  Adnexa: normal without masses or tenderness  Perineum: normal    Assessment/Plan:  1. WWE:   1.  Reviewed

## 2020-10-20 ENCOUNTER — OFFICE VISIT (OUTPATIENT)
Dept: INTERNAL MEDICINE CLINIC | Facility: CLINIC | Age: 70
End: 2020-10-20
Payer: MEDICARE

## 2020-10-20 VITALS
HEIGHT: 61.4 IN | WEIGHT: 115 LBS | BODY MASS INDEX: 21.43 KG/M2 | RESPIRATION RATE: 18 BRPM | DIASTOLIC BLOOD PRESSURE: 73 MMHG | SYSTOLIC BLOOD PRESSURE: 146 MMHG | HEART RATE: 65 BPM

## 2020-10-20 DIAGNOSIS — E78.00 PURE HYPERCHOLESTEROLEMIA: ICD-10-CM

## 2020-10-20 DIAGNOSIS — K21.9 LPRD (LARYNGOPHARYNGEAL REFLUX DISEASE): ICD-10-CM

## 2020-10-20 DIAGNOSIS — Z00.00 PHYSICAL EXAM, ANNUAL: Primary | ICD-10-CM

## 2020-10-20 PROCEDURE — 99397 PER PM REEVAL EST PAT 65+ YR: CPT | Performed by: INTERNAL MEDICINE

## 2020-10-20 PROCEDURE — G0008 ADMIN INFLUENZA VIRUS VAC: HCPCS | Performed by: INTERNAL MEDICINE

## 2020-10-20 PROCEDURE — G0439 PPPS, SUBSEQ VISIT: HCPCS | Performed by: INTERNAL MEDICINE

## 2020-10-20 PROCEDURE — 90662 IIV NO PRSV INCREASED AG IM: CPT | Performed by: INTERNAL MEDICINE

## 2020-10-20 PROCEDURE — 96160 PT-FOCUSED HLTH RISK ASSMT: CPT | Performed by: INTERNAL MEDICINE

## 2020-10-20 PROCEDURE — 3008F BODY MASS INDEX DOCD: CPT | Performed by: INTERNAL MEDICINE

## 2020-10-20 PROCEDURE — 3077F SYST BP >= 140 MM HG: CPT | Performed by: INTERNAL MEDICINE

## 2020-10-20 PROCEDURE — 3078F DIAST BP <80 MM HG: CPT | Performed by: INTERNAL MEDICINE

## 2020-10-20 RX ORDER — ALPRAZOLAM 0.25 MG/1
0.25 TABLET ORAL NIGHTLY PRN
Qty: 90 TABLET | Refills: 1 | Status: SHIPPED | OUTPATIENT
Start: 2020-10-20 | End: 2021-06-29

## 2020-10-23 ENCOUNTER — LAB ENCOUNTER (OUTPATIENT)
Dept: LAB | Age: 70
End: 2020-10-23
Attending: INTERNAL MEDICINE
Payer: MEDICARE

## 2020-10-23 ENCOUNTER — HOSPITAL ENCOUNTER (OUTPATIENT)
Dept: MAMMOGRAPHY | Age: 70
Discharge: HOME | End: 2020-10-23
Attending: OBSTETRICS & GYNECOLOGY
Payer: MEDICARE

## 2020-10-23 DIAGNOSIS — E78.00 PURE HYPERCHOLESTEROLEMIA: ICD-10-CM

## 2020-10-23 DIAGNOSIS — Z12.31 ENCOUNTER FOR SCREENING MAMMOGRAM FOR BREAST CANCER: ICD-10-CM

## 2020-10-23 PROCEDURE — 85025 COMPLETE CBC W/AUTO DIFF WBC: CPT

## 2020-10-23 PROCEDURE — 77063 BREAST TOMOSYNTHESIS BI: CPT | Performed by: OBSTETRICS & GYNECOLOGY

## 2020-10-23 PROCEDURE — 80061 LIPID PANEL: CPT

## 2020-10-23 PROCEDURE — 77067 SCR MAMMO BI INCL CAD: CPT | Performed by: OBSTETRICS & GYNECOLOGY

## 2020-10-23 PROCEDURE — 36415 COLL VENOUS BLD VENIPUNCTURE: CPT

## 2020-10-23 PROCEDURE — 80053 COMPREHEN METABOLIC PANEL: CPT

## 2020-10-26 NOTE — PROGRESS NOTES
HPI:   Ursula Torrez is a 69year old female who presents for a MA (Medicare Advantage) [facility] (Once per calendar year). Patient reports that overall she has been feeling well, learn to leave with llaryngopharyngeal reflux.   Watching diet r back pain without sciatica    Wt Readings from Last 3 Encounters:  10/20/20 : 115 lb (52.2 kg)  09/04/20 : 113 lb (51.3 kg)  02/04/20 : 117 lb (53.1 kg)     Last Cholesterol Labs:   Lab Results   Component Value Date    CHOLEST 281 (H) 10/23/2020    HDL 91 Cancer in her paternal cousin female; Ovarian Cancer (age of onset: 28) in her maternal cousin female; Prostate Cancer in her father; renal failure in her mother. SOCIAL HISTORY:   She  reports that she has quit smoking.  She has never used smokeless toba Yes      Physical Exam    Constitutional: She is oriented to person, place, and time. She appears well-developed and well-nourished. No distress. HENT:   Head: Normocephalic and atraumatic. Right Ear: Tympanic membrane is not erythematous.  No cerumen p • Pneumovax 23 10/27/2017   • TD 08/18/2010   • TDAP 11/03/2016   • Zoster Vaccine Recombinant Adjuvanted (Shingrix) 08/07/2019        ASSESSMENT AND OTHER RELEVANT CHRONIC CONDITIONS:   Cathi Rendon is a 69year old female who presents for a M (FSB)Annually Glucose (mg/dL)   Date Value   10/23/2020 89          Cardiovascular Disease Screening     LDL Annually LDL Cholesterol (mg/dL)   Date Value   10/23/2020 173 (H)        EKG - w/ Initial Preventative Physical Exam only, or if medically necessa virus carrier   Homosexual men   Illicit injectable drug abusers     Tetanus Toxoid  Only covered with a cut with metal- TD and TDaP Not covered by Medicare Part B) 08/18/2010 This may be covered with your prescription benefits, but Medicare does not cover

## 2020-12-16 ENCOUNTER — TELEPHONE (OUTPATIENT)
Dept: INTERNAL MEDICINE CLINIC | Facility: CLINIC | Age: 70
End: 2020-12-16

## 2021-03-15 DIAGNOSIS — Z23 NEED FOR VACCINATION: ICD-10-CM

## 2021-03-19 ENCOUNTER — LAB ENCOUNTER (OUTPATIENT)
Dept: LAB | Age: 71
End: 2021-03-19
Attending: INTERNAL MEDICINE
Payer: MEDICARE

## 2021-03-19 DIAGNOSIS — Z20.822 EXPOSURE TO COVID-19 VIRUS: ICD-10-CM

## 2021-03-19 LAB — SARS-COV-2 IGG+IGM SERPL QL IA: NONREACTIVE

## 2021-03-19 PROCEDURE — 86769 SARS-COV-2 COVID-19 ANTIBODY: CPT

## 2021-03-19 PROCEDURE — 36415 COLL VENOUS BLD VENIPUNCTURE: CPT

## 2021-06-19 ENCOUNTER — PATIENT MESSAGE (OUTPATIENT)
Dept: INTERNAL MEDICINE CLINIC | Facility: CLINIC | Age: 71
End: 2021-06-19

## 2021-06-19 NOTE — TELEPHONE ENCOUNTER
From: Sen Pineda  To: Diane Alvarez MD  Sent: 6/19/2021 11:45 AM CDT  Subject: Other    Recently, I communicated with you my decision to not get a COVID vaccine.  I've come across information on the successful use of Ivermectin as a prophylaxis

## 2021-06-23 NOTE — TELEPHONE ENCOUNTER
Dr. Marleni Perez - Patient has a follow-up question to your response. See below and please advise. Thank you      To: ALEIX Forrest      From: Verona Romberg      Created: 6/22/2021 10:32 PM        Ok. Another question then.  What medications are you famil

## 2021-06-30 RX ORDER — ALPRAZOLAM 0.25 MG/1
0.25 TABLET ORAL NIGHTLY PRN
Qty: 90 TABLET | Refills: 1 | Status: SHIPPED | OUTPATIENT
Start: 2021-06-30

## 2021-09-03 ENCOUNTER — APPOINTMENT (OUTPATIENT)
Dept: GENERAL RADIOLOGY | Facility: HOSPITAL | Age: 71
End: 2021-09-03
Attending: EMERGENCY MEDICINE
Payer: MEDICARE

## 2021-09-03 ENCOUNTER — HOSPITAL ENCOUNTER (EMERGENCY)
Facility: HOSPITAL | Age: 71
Discharge: HOME | End: 2021-09-03
Attending: EMERGENCY MEDICINE
Payer: MEDICARE

## 2021-09-03 VITALS
RESPIRATION RATE: 19 BRPM | HEART RATE: 48 BPM | HEIGHT: 61 IN | TEMPERATURE: 98 F | DIASTOLIC BLOOD PRESSURE: 80 MMHG | SYSTOLIC BLOOD PRESSURE: 99 MMHG | WEIGHT: 112 LBS | BODY MASS INDEX: 21.14 KG/M2 | OXYGEN SATURATION: 99 %

## 2021-09-03 DIAGNOSIS — R42 LIGHTHEADED: ICD-10-CM

## 2021-09-03 DIAGNOSIS — R00.1 SINUS BRADYCARDIA: Primary | ICD-10-CM

## 2021-09-03 LAB
ANION GAP SERPL CALC-SCNC: 4 MMOL/L (ref 0–18)
BASOPHILS # BLD AUTO: 0.04 X10(3) UL (ref 0–0.2)
BASOPHILS NFR BLD AUTO: 1 %
BUN BLD-MCNC: 15 MG/DL (ref 7–18)
BUN/CREAT SERPL: 17.9 (ref 10–20)
CALCIUM BLD-MCNC: 9.1 MG/DL (ref 8.5–10.1)
CHLORIDE SERPL-SCNC: 108 MMOL/L (ref 98–112)
CO2 SERPL-SCNC: 29 MMOL/L (ref 21–32)
CREAT BLD-MCNC: 0.84 MG/DL
DEPRECATED RDW RBC AUTO: 42.7 FL (ref 35.1–46.3)
EOSINOPHIL # BLD AUTO: 0.09 X10(3) UL (ref 0–0.7)
EOSINOPHIL NFR BLD AUTO: 2.2 %
ERYTHROCYTE [DISTWIDTH] IN BLOOD BY AUTOMATED COUNT: 11.9 % (ref 11–15)
GLUCOSE BLD-MCNC: 106 MG/DL (ref 70–99)
HCT VFR BLD AUTO: 39.1 %
HGB BLD-MCNC: 12.7 G/DL
IMM GRANULOCYTES # BLD AUTO: 0.01 X10(3) UL (ref 0–1)
IMM GRANULOCYTES NFR BLD: 0.2 %
LYMPHOCYTES # BLD AUTO: 1.46 X10(3) UL (ref 1–4)
LYMPHOCYTES NFR BLD AUTO: 36.1 %
MCH RBC QN AUTO: 31.5 PG (ref 26–34)
MCHC RBC AUTO-ENTMCNC: 32.5 G/DL (ref 31–37)
MCV RBC AUTO: 97 FL
MONOCYTES # BLD AUTO: 0.33 X10(3) UL (ref 0.1–1)
MONOCYTES NFR BLD AUTO: 8.2 %
NEUTROPHILS # BLD AUTO: 2.11 X10 (3) UL (ref 1.5–7.7)
NEUTROPHILS # BLD AUTO: 2.11 X10(3) UL (ref 1.5–7.7)
NEUTROPHILS NFR BLD AUTO: 52.3 %
OSMOLALITY SERPL CALC.SUM OF ELEC: 293 MOSM/KG (ref 275–295)
PLATELET # BLD AUTO: 219 10(3)UL (ref 150–450)
POTASSIUM SERPL-SCNC: 3.5 MMOL/L (ref 3.5–5.1)
RBC # BLD AUTO: 4.03 X10(6)UL
SODIUM SERPL-SCNC: 141 MMOL/L (ref 136–145)
TROPONIN I SERPL-MCNC: <0.045 NG/ML (ref ?–0.04)
WBC # BLD AUTO: 4 X10(3) UL (ref 4–11)

## 2021-09-03 PROCEDURE — 71045 X-RAY EXAM CHEST 1 VIEW: CPT | Performed by: EMERGENCY MEDICINE

## 2021-09-03 PROCEDURE — 36415 COLL VENOUS BLD VENIPUNCTURE: CPT

## 2021-09-03 PROCEDURE — 80048 BASIC METABOLIC PNL TOTAL CA: CPT | Performed by: EMERGENCY MEDICINE

## 2021-09-03 PROCEDURE — 93010 ELECTROCARDIOGRAM REPORT: CPT | Performed by: EMERGENCY MEDICINE

## 2021-09-03 PROCEDURE — 84484 ASSAY OF TROPONIN QUANT: CPT | Performed by: EMERGENCY MEDICINE

## 2021-09-03 PROCEDURE — 85025 COMPLETE CBC W/AUTO DIFF WBC: CPT | Performed by: EMERGENCY MEDICINE

## 2021-09-03 PROCEDURE — 93005 ELECTROCARDIOGRAM TRACING: CPT

## 2021-09-03 PROCEDURE — 99284 EMERGENCY DEPT VISIT MOD MDM: CPT

## 2021-09-03 NOTE — ED PROVIDER NOTES
Patient Seen in: [facility]    History   Patient presents with:  Dizziness    Stated Complaint: Dizzy with chest pain     HPI    Patient presents to the emergency department with her .   She has had starting last night a mouth 2 (two) times daily. Multiple Vitamins-Minerals (BIOTIN PLUS/CALCIUM/VIT D3) Oral Tab,  Take 1 tablet by mouth. Emollient (COLLAGEN EX),  Take by mouth daily.  Collagen powder 2 tbsp    Calcium Carbonate Antacid 500 MG Oral Chew Tab,  Chew 2 table rate was lower I did discuss with her that her heart rate has actually been lower than this in the past she has a previous EKG with a heart rate of 52 a few years ago.     Patient during her 2-hour stay in the emergency department had consistent mild bradyc Impression:  Sinus bradycardia  (primary encounter diagnosis)  Lightheaded    Disposition:  There is no disposition on file for this visit.     Follow-up:  Ernesto Johnson MD  [address] [place] [address] [facility]  [address]  [phone]    In 5 days  For re-check

## 2021-09-08 ENCOUNTER — PATIENT MESSAGE (OUTPATIENT)
Dept: INTERNAL MEDICINE CLINIC | Facility: CLINIC | Age: 71
End: 2021-09-08

## 2021-09-08 ENCOUNTER — OFFICE VISIT (OUTPATIENT)
Dept: CARDIOLOGY CLINIC | Facility: CLINIC | Age: 71
End: 2021-09-08
Payer: MEDICARE

## 2021-09-08 VITALS
HEIGHT: 61 IN | DIASTOLIC BLOOD PRESSURE: 73 MMHG | HEART RATE: 60 BPM | BODY MASS INDEX: 21.52 KG/M2 | WEIGHT: 114 LBS | SYSTOLIC BLOOD PRESSURE: 134 MMHG

## 2021-09-08 DIAGNOSIS — R00.1 BRADYCARDIA: Primary | ICD-10-CM

## 2021-09-08 PROCEDURE — 3008F BODY MASS INDEX DOCD: CPT | Performed by: NURSE PRACTITIONER

## 2021-09-08 PROCEDURE — 3075F SYST BP GE 130 - 139MM HG: CPT | Performed by: NURSE PRACTITIONER

## 2021-09-08 PROCEDURE — 99214 OFFICE O/P EST MOD 30 MIN: CPT | Performed by: NURSE PRACTITIONER

## 2021-09-08 PROCEDURE — 3078F DIAST BP <80 MM HG: CPT | Performed by: NURSE PRACTITIONER

## 2021-09-08 NOTE — PROGRESS NOTES
Feliberto Fairchild is a 66year old female. Patient presents with:  ER F/U: Low HR, light headed  Palpitations: at times    HX PVC's    HPI:   Patient comes in today for follow-up from the ER. . She sees Dr. Phyllistine Burkitt 2 years ago.   At that time she was lira • H/O mammogram 2/2000   • H/O sigmoidoscopy 05/2000   • Lipid screening 10/26/2013    per NG   • Mitral valve disorder     per NG: slighty thickened mitral valve in early 90's as per pt.  - No treatment   • Other ill-defined conditions(799.89)     per NG have history of sinus bradycardia from an event monitor also some brief episodes of SVT which she does not find problematic.   Because of her symptoms I like to do an echocardiogram to look at structure as well as a Holter to rule out any significant bradyc

## 2021-09-08 NOTE — TELEPHONE ENCOUNTER
From: Liliana Hess  To: Alicia Gil MD  Sent: 9/8/2021 11:29 AM CDT  Subject: Non-Urgent Medical Question    Hello, doctor,  To update you: After 2 weeks on Ivermectin, without incident, I decided to get the J&J COVID vaccine 8/27.  I had mild f

## 2021-09-09 ENCOUNTER — LAB ENCOUNTER (OUTPATIENT)
Dept: LAB | Age: 71
End: 2021-09-09
Attending: INTERNAL MEDICINE
Payer: MEDICARE

## 2021-09-09 DIAGNOSIS — E78.5 HYPERLIPIDEMIA, UNSPECIFIED HYPERLIPIDEMIA TYPE: ICD-10-CM

## 2021-09-09 DIAGNOSIS — R00.1 BRADYCARDIA: ICD-10-CM

## 2021-09-09 LAB
ALBUMIN SERPL-MCNC: 3.8 G/DL (ref 3.4–5)
ALBUMIN/GLOB SERPL: 1.1 {RATIO} (ref 1–2)
ALP LIVER SERPL-CCNC: 71 U/L
ALT SERPL-CCNC: 22 U/L
ANION GAP SERPL CALC-SCNC: 6 MMOL/L (ref 0–18)
AST SERPL-CCNC: 22 U/L (ref 15–37)
BASOPHILS # BLD AUTO: 0.05 X10(3) UL (ref 0–0.2)
BASOPHILS NFR BLD AUTO: 0.9 %
BILIRUB SERPL-MCNC: 1.1 MG/DL (ref 0.1–2)
BUN BLD-MCNC: 13 MG/DL (ref 7–18)
BUN/CREAT SERPL: 18.1 (ref 10–20)
CALCIUM BLD-MCNC: 9 MG/DL (ref 8.5–10.1)
CHLORIDE SERPL-SCNC: 109 MMOL/L (ref 98–112)
CHOLEST SMN-MCNC: 232 MG/DL (ref ?–200)
CO2 SERPL-SCNC: 27 MMOL/L (ref 21–32)
CREAT BLD-MCNC: 0.72 MG/DL
DEPRECATED RDW RBC AUTO: 44.4 FL (ref 35.1–46.3)
EOSINOPHIL # BLD AUTO: 0.1 X10(3) UL (ref 0–0.7)
EOSINOPHIL NFR BLD AUTO: 1.8 %
ERYTHROCYTE [DISTWIDTH] IN BLOOD BY AUTOMATED COUNT: 12.2 % (ref 11–15)
GLOBULIN PLAS-MCNC: 3.6 G/DL (ref 2.8–4.4)
GLUCOSE BLD-MCNC: 93 MG/DL (ref 70–99)
HCT VFR BLD AUTO: 39.1 %
HDLC SERPL-MCNC: 78 MG/DL (ref 40–59)
HGB BLD-MCNC: 12.7 G/DL
IMM GRANULOCYTES # BLD AUTO: 0.01 X10(3) UL (ref 0–1)
IMM GRANULOCYTES NFR BLD: 0.2 %
LDLC SERPL CALC-MCNC: 139 MG/DL (ref ?–100)
LYMPHOCYTES # BLD AUTO: 1.39 X10(3) UL (ref 1–4)
LYMPHOCYTES NFR BLD AUTO: 25.4 %
M PROTEIN MFR SERPL ELPH: 7.4 G/DL (ref 6.4–8.2)
MCH RBC QN AUTO: 31.8 PG (ref 26–34)
MCHC RBC AUTO-ENTMCNC: 32.5 G/DL (ref 31–37)
MCV RBC AUTO: 98 FL
MONOCYTES # BLD AUTO: 0.49 X10(3) UL (ref 0.1–1)
MONOCYTES NFR BLD AUTO: 8.9 %
NEUTROPHILS # BLD AUTO: 3.44 X10 (3) UL (ref 1.5–7.7)
NEUTROPHILS # BLD AUTO: 3.44 X10(3) UL (ref 1.5–7.7)
NEUTROPHILS NFR BLD AUTO: 62.8 %
NONHDLC SERPL-MCNC: 154 MG/DL (ref ?–130)
OSMOLALITY SERPL CALC.SUM OF ELEC: 294 MOSM/KG (ref 275–295)
PATIENT FASTING Y/N/NP: YES
PATIENT FASTING Y/N/NP: YES
PLATELET # BLD AUTO: 256 10(3)UL (ref 150–450)
POTASSIUM SERPL-SCNC: 4.2 MMOL/L (ref 3.5–5.1)
RBC # BLD AUTO: 3.99 X10(6)UL
SODIUM SERPL-SCNC: 142 MMOL/L (ref 136–145)
TRIGL SERPL-MCNC: 89 MG/DL (ref 30–149)
TSI SER-ACNC: 1.86 MIU/ML (ref 0.36–3.74)
VLDLC SERPL CALC-MCNC: 16 MG/DL (ref 0–30)
WBC # BLD AUTO: 5.5 X10(3) UL (ref 4–11)

## 2021-09-09 PROCEDURE — 80061 LIPID PANEL: CPT

## 2021-09-09 PROCEDURE — 80053 COMPREHEN METABOLIC PANEL: CPT

## 2021-09-09 PROCEDURE — 36415 COLL VENOUS BLD VENIPUNCTURE: CPT

## 2021-09-09 PROCEDURE — 85025 COMPLETE CBC W/AUTO DIFF WBC: CPT

## 2021-09-09 PROCEDURE — 84443 ASSAY THYROID STIM HORMONE: CPT

## 2021-09-13 ENCOUNTER — HOSPITAL ENCOUNTER (OUTPATIENT)
Dept: CV DIAGNOSTICS | Facility: HOSPITAL | Age: 71
Discharge: HOME | End: 2021-09-13
Attending: NURSE PRACTITIONER
Payer: MEDICARE

## 2021-09-13 DIAGNOSIS — R00.1 BRADYCARDIA: ICD-10-CM

## 2021-09-13 PROCEDURE — 93225 XTRNL ECG REC<48 HRS REC: CPT | Performed by: NURSE PRACTITIONER

## 2021-09-13 PROCEDURE — 93227 XTRNL ECG REC<48 HR R&I: CPT | Performed by: NURSE PRACTITIONER

## 2021-09-21 ENCOUNTER — HOSPITAL ENCOUNTER (OUTPATIENT)
Dept: CV DIAGNOSTICS | Facility: HOSPITAL | Age: 71
Discharge: HOME | End: 2021-09-21
Attending: NURSE PRACTITIONER
Payer: MEDICARE

## 2021-09-21 DIAGNOSIS — R00.1 BRADYCARDIA: ICD-10-CM

## 2021-09-21 PROCEDURE — 93306 TTE W/DOPPLER COMPLETE: CPT | Performed by: NURSE PRACTITIONER

## 2021-10-25 ENCOUNTER — HOSPITAL ENCOUNTER (OUTPATIENT)
Dept: MAMMOGRAPHY | Age: 71
Discharge: HOME | End: 2021-10-25
Attending: INTERNAL MEDICINE
Payer: MEDICARE

## 2021-10-25 DIAGNOSIS — Z12.31 ENCOUNTER FOR SCREENING MAMMOGRAM FOR MALIGNANT NEOPLASM OF BREAST: ICD-10-CM

## 2021-10-25 PROCEDURE — 77067 SCR MAMMO BI INCL CAD: CPT | Performed by: INTERNAL MEDICINE

## 2021-10-25 PROCEDURE — 77063 BREAST TOMOSYNTHESIS BI: CPT | Performed by: INTERNAL MEDICINE

## 2021-11-09 ENCOUNTER — HOSPITAL ENCOUNTER (OUTPATIENT)
Age: 71
Discharge: HOME | End: 2021-11-09
Attending: EMERGENCY MEDICINE
Payer: MEDICARE

## 2021-11-09 ENCOUNTER — TELEPHONE (OUTPATIENT)
Dept: INTERNAL MEDICINE CLINIC | Facility: CLINIC | Age: 71
End: 2021-11-09

## 2021-11-09 DIAGNOSIS — J02.0 STREPTOCOCCAL SORE THROAT: Primary | ICD-10-CM

## 2021-11-09 DIAGNOSIS — R59.0 ENLARGED LYMPH NODE IN NECK: ICD-10-CM

## 2021-11-09 PROCEDURE — 99214 OFFICE O/P EST MOD 30 MIN: CPT

## 2021-11-09 PROCEDURE — 99213 OFFICE O/P EST LOW 20 MIN: CPT

## 2021-11-09 PROCEDURE — 87880 STREP A ASSAY W/OPTIC: CPT

## 2021-11-09 RX ORDER — AMOXICILLIN 500 MG/1
500 TABLET, FILM COATED ORAL 2 TIMES DAILY
Qty: 20 TABLET | Refills: 0 | Status: SHIPPED | OUTPATIENT
Start: 2021-11-09 | End: 2021-11-18 | Stop reason: ALTCHOICE

## 2021-11-09 NOTE — ED INITIAL ASSESSMENT (HPI)
PATIENT ARRIVED AMBULATORY TO ROOM C/O SYMPTOMS THAT STARTED 1 WEEK AGO. +\"BUMP\" BEHIND THE LEFT EAR. PATIENT DENIES PAIN TO THE EAR.

## 2021-11-09 NOTE — ED PROVIDER NOTES
Patient Seen in: [facility]      History   Patient presents with:  Ear Problem    Stated Complaint: bump behind ear, cough    Subjective:   HPI    The patient is a 26-year-old female with past history of anxiety, pneumonia presents now with m Used    Alcohol use: No    Drug use: No             Review of Systems    Positive for stated complaint: bump behind ear, cough  Other systems are as noted in HPI. Constitutional and vital signs reviewed.       All other systems reviewed and negative except to the left ear. Recommend follow-up with primary MD for any persistent lymph node swelling.     MDM      Reactive lymph node versus primary lymph node enlargement                           Disposition and Plan     Clinical Impression:  Streptococcal sore

## 2021-11-15 ENCOUNTER — OFFICE VISIT (OUTPATIENT)
Dept: INTERNAL MEDICINE CLINIC | Facility: CLINIC | Age: 71
End: 2021-11-15
Payer: MEDICARE

## 2021-11-15 VITALS
DIASTOLIC BLOOD PRESSURE: 76 MMHG | HEART RATE: 61 BPM | BODY MASS INDEX: 21.9 KG/M2 | WEIGHT: 116 LBS | HEIGHT: 61 IN | RESPIRATION RATE: 18 BRPM | SYSTOLIC BLOOD PRESSURE: 128 MMHG

## 2021-11-15 DIAGNOSIS — R35.0 URINARY FREQUENCY: Primary | ICD-10-CM

## 2021-11-15 PROCEDURE — 3078F DIAST BP <80 MM HG: CPT | Performed by: INTERNAL MEDICINE

## 2021-11-15 PROCEDURE — 99212 OFFICE O/P EST SF 10 MIN: CPT | Performed by: INTERNAL MEDICINE

## 2021-11-15 PROCEDURE — 3008F BODY MASS INDEX DOCD: CPT | Performed by: INTERNAL MEDICINE

## 2021-11-15 PROCEDURE — 3074F SYST BP LT 130 MM HG: CPT | Performed by: INTERNAL MEDICINE

## 2021-11-15 PROCEDURE — 81002 URINALYSIS NONAUTO W/O SCOPE: CPT | Performed by: INTERNAL MEDICINE

## 2021-11-15 NOTE — PROGRESS NOTES
Subjective:   Patient ID: Ila Degroot is a 66year old female.   For follow-up on urinary symptoms    HPI  Patient developed slight swelling of the lymph node below the left ear, was seen at immediate care center, given prescription for amoxicill 21.92 kg/m²   Physical Exam  Constitutional:       Appearance: Normal appearance. HENT:      Head: Normocephalic and atraumatic.       Right Ear: Tympanic membrane and ear canal normal.      Left Ear: Tympanic membrane and ear canal normal.      Mouth/Thr

## 2021-11-17 ENCOUNTER — TELEPHONE (OUTPATIENT)
Dept: INTERNAL MEDICINE CLINIC | Facility: CLINIC | Age: 71
End: 2021-11-17

## 2021-11-17 NOTE — TELEPHONE ENCOUNTER
Patient states last night she broke out in a rash on her back, buttocks, and upper thighs. States rash is small red spots, slight raised and denies itching, pain, fever, shortness of breath.     Patient started her Amoxicillin for step throat on 11/9 and h

## 2021-11-18 ENCOUNTER — OFFICE VISIT (OUTPATIENT)
Dept: FAMILY MEDICINE CLINIC | Facility: CLINIC | Age: 71
End: 2021-11-18
Payer: MEDICARE

## 2021-11-18 VITALS
HEIGHT: 61 IN | WEIGHT: 114 LBS | SYSTOLIC BLOOD PRESSURE: 123 MMHG | DIASTOLIC BLOOD PRESSURE: 76 MMHG | HEART RATE: 67 BPM | BODY MASS INDEX: 21.52 KG/M2

## 2021-11-18 DIAGNOSIS — Z88.1 ALLERGIC REACTION TO AMOXICILLIN/CLAVULANIC ACID: ICD-10-CM

## 2021-11-18 DIAGNOSIS — Z87.09 HISTORY OF STREP PHARYNGITIS: Primary | ICD-10-CM

## 2021-11-18 PROCEDURE — 3078F DIAST BP <80 MM HG: CPT | Performed by: PHYSICIAN ASSISTANT

## 2021-11-18 PROCEDURE — 99202 OFFICE O/P NEW SF 15 MIN: CPT | Performed by: PHYSICIAN ASSISTANT

## 2021-11-18 PROCEDURE — 87880 STREP A ASSAY W/OPTIC: CPT | Performed by: PHYSICIAN ASSISTANT

## 2021-11-18 PROCEDURE — 3008F BODY MASS INDEX DOCD: CPT | Performed by: PHYSICIAN ASSISTANT

## 2021-11-18 PROCEDURE — 3074F SYST BP LT 130 MM HG: CPT | Performed by: PHYSICIAN ASSISTANT

## 2021-11-18 RX ORDER — METHYLPREDNISOLONE 4 MG/1
TABLET ORAL
Qty: 1 EACH | Refills: 0 | Status: SHIPPED | OUTPATIENT
Start: 2021-11-18 | End: 2021-12-06 | Stop reason: ALTCHOICE

## 2021-11-18 RX ORDER — ACETAMINOPHEN 160 MG
TABLET,DISINTEGRATING ORAL
COMMUNITY
Start: 2020-04-01

## 2021-11-18 RX ORDER — CALCIUM CARBONATE/VITAMIN D3 500-10/5ML
LIQUID (ML) ORAL
COMMUNITY
Start: 2020-07-01

## 2021-11-18 RX ORDER — MAGNESIUM OXIDE 400 MG (241.3 MG MAGNESIUM) TABLET
2 TABLET
COMMUNITY
End: 2021-12-06

## 2021-11-18 NOTE — TELEPHONE ENCOUNTER
Patient calling, identified name and , states rash is \" really spreading compared to yesterday \"   Has been taking oral Benadryl and apply aloe vera cream and Bacitracin , has taken an oatmeal bath   The above has provided some relief but she is alarm

## 2021-11-18 NOTE — TELEPHONE ENCOUNTER
Spoke to patient, advised her to be seen, she agreed to see you physician assistant at lumbar today, NASREEN Minaya call patient with appointment time

## 2021-11-18 NOTE — PROGRESS NOTES
HPI:     HPI   66year-old female is here in the office complaining of rashes for the past 2 days after she took Amoxicillin. The rashes are all over her body, and itchy. Patient has been taking Benadryl with mild relief.  Patient states that she has no sor recorded.  Patient is postmenopausal.   Past Medical History:     Past Medical History:   Diagnosis Date   • Anemia    • Anxiety state, unspecified    • Arthritis    • Esophageal reflux    • H/O cystoscopy 1995, 2006   • H/O mammogram 2/2000   • H/O sigmoid Exposure: Not Asked        Hobby Hazards: Not Asked        Sleep Concern: Not Asked        Stress Concern: Not Asked        Weight Concern: Not Asked        Special Diet: Not Asked        Back Care: Not Asked        Exercise: Not Asked        Bike Helmet: Effort: Pulmonary effort is normal.      Breath sounds: Normal breath sounds. No wheezing or rales. Chest:      Chest wall: No tenderness. Lymphadenopathy:      Cervical: No cervical adenopathy. Skin:     Findings: Rash present.    Neurological:

## 2021-11-20 ENCOUNTER — TELEPHONE (OUTPATIENT)
Dept: FAMILY MEDICINE CLINIC | Facility: CLINIC | Age: 71
End: 2021-11-20

## 2021-11-20 NOTE — TELEPHONE ENCOUNTER
Pt was seen 11/18/21, stated rash is better, but side effects - face flushed, can't sleep, heartburn - advised those are side effects of steroids  Asking if she can stop it, did not take today dose, wants to use calamine lotion/benadryl

## 2021-11-22 ENCOUNTER — OFFICE VISIT (OUTPATIENT)
Dept: OTOLARYNGOLOGY | Facility: CLINIC | Age: 71
End: 2021-11-22
Payer: MEDICARE

## 2021-11-22 VITALS — TEMPERATURE: 98 F | HEIGHT: 61.5 IN | BODY MASS INDEX: 21.25 KG/M2 | WEIGHT: 114 LBS

## 2021-11-22 DIAGNOSIS — H61.21 EXCESSIVE EAR WAX, RIGHT: Primary | ICD-10-CM

## 2021-11-22 DIAGNOSIS — R59.1 LYMPHADENOPATHY: ICD-10-CM

## 2021-11-22 PROCEDURE — 99203 OFFICE O/P NEW LOW 30 MIN: CPT | Performed by: OTOLARYNGOLOGY

## 2021-11-22 PROCEDURE — 69210 REMOVE IMPACTED EAR WAX UNI: CPT | Performed by: OTOLARYNGOLOGY

## 2021-11-22 PROCEDURE — 3008F BODY MASS INDEX DOCD: CPT | Performed by: OTOLARYNGOLOGY

## 2021-11-22 NOTE — PROGRESS NOTES
Hellen Aldrich is a 66year old female.   Patient presents with:  Ear Problem: patient is here for ear cleaning  Other: patient had strep throat,had swollen glands      HISTORY OF PRESENT ILLNESS  11/15/2017  Patient presents for evaluation of loc some ongoing heartburn she states that she had GERD in the past but none of the medication she was I had really helped her so she hasn't been on a longstanding medicine  Social History    Socioeconomic History      Marital status:     Tobacco Use Negative Chest pain, irregular heartbeat/palpitations and syncope. GI Negative Abdominal pain and diarrhea. Endocrine Negative Cold intolerance and heat intolerance. Neuro Negative Tremors. Psych Negative Anxiety and depression.    Integumentary Neg 1250 (500 Ca) MG Oral Chew Tab, Chew 2 tablets by mouth., Disp: , Rfl:   •  Zinc 30 MG Oral Cap, , Disp: , Rfl:   •  Vitamin D3, Cholecalciferol, 50 MCG (2000 UT) Oral Cap, , Disp: , Rfl:   •  Alum Hydroxide-Mag Carbonate (GAVISCON OR), , Disp: , Rfl:   •

## 2021-12-06 ENCOUNTER — OFFICE VISIT (OUTPATIENT)
Dept: INTERNAL MEDICINE CLINIC | Facility: CLINIC | Age: 71
End: 2021-12-06
Payer: MEDICARE

## 2021-12-06 VITALS
RESPIRATION RATE: 18 BRPM | HEART RATE: 69 BPM | WEIGHT: 112.38 LBS | SYSTOLIC BLOOD PRESSURE: 130 MMHG | HEIGHT: 61.5 IN | DIASTOLIC BLOOD PRESSURE: 76 MMHG | BODY MASS INDEX: 20.94 KG/M2

## 2021-12-06 DIAGNOSIS — N95.2 ATROPHIC VAGINITIS: ICD-10-CM

## 2021-12-06 DIAGNOSIS — Z01.419 ENCOUNTER FOR GYNECOLOGICAL EXAMINATION: Primary | ICD-10-CM

## 2021-12-06 PROCEDURE — 3078F DIAST BP <80 MM HG: CPT | Performed by: INTERNAL MEDICINE

## 2021-12-06 PROCEDURE — 99214 OFFICE O/P EST MOD 30 MIN: CPT | Performed by: INTERNAL MEDICINE

## 2021-12-06 PROCEDURE — 3008F BODY MASS INDEX DOCD: CPT | Performed by: INTERNAL MEDICINE

## 2021-12-06 PROCEDURE — 3075F SYST BP GE 130 - 139MM HG: CPT | Performed by: INTERNAL MEDICINE

## 2021-12-06 RX ORDER — ESTRADIOL 0.1 MG/G
CREAM VAGINAL
Qty: 1 EACH | Refills: 0 | Status: SHIPPED | OUTPATIENT
Start: 2021-12-06

## 2021-12-07 ENCOUNTER — PATIENT MESSAGE (OUTPATIENT)
Dept: INTERNAL MEDICINE CLINIC | Facility: CLINIC | Age: 71
End: 2021-12-07

## 2021-12-08 NOTE — TELEPHONE ENCOUNTER
From: Verona Romberg  To:  Lauren Tyalor MD  Sent: 12/7/2021 6:28 PM CST  Subject: [facility], Dr. Marleni Perez,   I got a message from Dr. Tonia Fernando group that they are leaving [facility], [facility] cardio

## 2021-12-10 NOTE — PROGRESS NOTES
Subjective:   Patient ID: Charisse Coley is a 66year old female. Presents for gynecological exam, vaginal itching    HPI  Patient is bothered by itching in the vaginal area for some time on and off.   Experiencing vaginal dryness, painful intercou Right adnexa normal and left adnexa normal.        Right: No tenderness. Left: No tenderness.         Comments: Atrophic changes of the vagina and cervix, cervical os is closed, attempted to do Pap smear  Lymphadenopathy:      Lower Body: No right

## 2021-12-21 ENCOUNTER — TELEMEDICINE (OUTPATIENT)
Dept: INTERNAL MEDICINE CLINIC | Facility: CLINIC | Age: 71
End: 2021-12-21

## 2021-12-21 ENCOUNTER — PATIENT MESSAGE (OUTPATIENT)
Dept: INTERNAL MEDICINE CLINIC | Facility: CLINIC | Age: 71
End: 2021-12-21

## 2021-12-21 ENCOUNTER — NURSE TRIAGE (OUTPATIENT)
Dept: INTERNAL MEDICINE CLINIC | Facility: CLINIC | Age: 71
End: 2021-12-21

## 2021-12-21 ENCOUNTER — NURSE ONLY (OUTPATIENT)
Dept: LAB | Age: 71
End: 2021-12-21
Attending: INTERNAL MEDICINE
Payer: MEDICARE

## 2021-12-21 DIAGNOSIS — R05.9 COUGH: ICD-10-CM

## 2021-12-21 DIAGNOSIS — U07.1 COVID-19 VIRUS INFECTION: ICD-10-CM

## 2021-12-21 DIAGNOSIS — U07.1 COVID-19 VIRUS INFECTION: Primary | ICD-10-CM

## 2021-12-21 PROCEDURE — 99214 OFFICE O/P EST MOD 30 MIN: CPT | Performed by: INTERNAL MEDICINE

## 2021-12-21 NOTE — TELEPHONE ENCOUNTER
Action Requested: Summary for Provider     []  Critical Lab, Recommendations Needed  [] Need Additional Advice  []   FYI    []   Need Orders  [] Need Medications Sent to Pharmacy  []  Other     SUMMARY: Virtual appt scheduled with Mario Multani at 1:30 p

## 2021-12-22 ENCOUNTER — TELEPHONE (OUTPATIENT)
Dept: CASE MANAGEMENT | Facility: HOSPITAL | Age: 71
End: 2021-12-22

## 2021-12-22 ENCOUNTER — TELEPHONE (OUTPATIENT)
Dept: CASE MANAGEMENT | Age: 71
End: 2021-12-22

## 2021-12-22 DIAGNOSIS — U07.1 COVID-19: Primary | ICD-10-CM

## 2021-12-22 NOTE — PROGRESS NOTES
Subjective:   Patient ID: Phillip Ullao is a 66year old female. HPI  Patient seen live 2 way video visit she is seen today today with symptoms of cough congestion some shortness of breath recently diagnosed with Covid.   No chest pain  History/ antibody treatment will order monitor symptoms any worsening symptoms like chest pain or shortness of breath need to ER  Cough as above    Orders Placed This Encounter      COVID-19 Testing by PCR ([facility]) [E]      Meds This Visit:  Requested Prescription

## 2021-12-22 NOTE — TELEPHONE ENCOUNTER
From: Sen Pineda  To: Ganesh Stafford MD  Sent: 12/21/2021 6:51 PM CST  Subject: Monoclonal antibodies bakari     HelloDr. Sarah Beth,   Just wanted to let you know I did have a PCR test at [facility] at 2:45 today. Results in 48 hours.  I have n

## 2022-02-25 ENCOUNTER — LAB ENCOUNTER (OUTPATIENT)
Dept: LAB | Age: 72
End: 2022-02-25
Attending: INTERNAL MEDICINE
Payer: MEDICARE

## 2022-02-25 ENCOUNTER — OFFICE VISIT (OUTPATIENT)
Dept: INTERNAL MEDICINE CLINIC | Facility: CLINIC | Age: 72
End: 2022-02-25
Payer: COMMERCIAL

## 2022-02-25 VITALS
RESPIRATION RATE: 18 BRPM | WEIGHT: 115 LBS | BODY MASS INDEX: 21 KG/M2 | SYSTOLIC BLOOD PRESSURE: 152 MMHG | HEART RATE: 69 BPM | DIASTOLIC BLOOD PRESSURE: 76 MMHG

## 2022-02-25 DIAGNOSIS — R68.2 MOUTH DRYNESS: ICD-10-CM

## 2022-02-25 DIAGNOSIS — Z13.1 SCREENING FOR DIABETES MELLITUS: ICD-10-CM

## 2022-02-25 DIAGNOSIS — R10.13 EPIGASTRIC PAIN: ICD-10-CM

## 2022-02-25 DIAGNOSIS — M35.00 SICCA SYNDROME (HCC): ICD-10-CM

## 2022-02-25 DIAGNOSIS — M35.00 SICCA SYNDROME (HCC): Primary | ICD-10-CM

## 2022-02-25 LAB
ALBUMIN SERPL-MCNC: 3.9 G/DL (ref 3.4–5)
ALBUMIN/GLOB SERPL: 1.1 {RATIO} (ref 1–2)
ALP LIVER SERPL-CCNC: 81 U/L
ALT SERPL-CCNC: 34 U/L
ANION GAP SERPL CALC-SCNC: 7 MMOL/L (ref 0–18)
AST SERPL-CCNC: 27 U/L (ref 15–37)
BASOPHILS # BLD AUTO: 0.05 X10(3) UL (ref 0–0.2)
BASOPHILS NFR BLD AUTO: 0.8 %
BILIRUB SERPL-MCNC: 0.8 MG/DL (ref 0.1–2)
BUN BLD-MCNC: 15 MG/DL (ref 7–18)
BUN/CREAT SERPL: 17.4 (ref 10–20)
CALCIUM BLD-MCNC: 9.6 MG/DL (ref 8.5–10.1)
CHLORIDE SERPL-SCNC: 106 MMOL/L (ref 98–112)
CO2 SERPL-SCNC: 28 MMOL/L (ref 21–32)
CREAT BLD-MCNC: 0.86 MG/DL
DEPRECATED RDW RBC AUTO: 43.8 FL (ref 35.1–46.3)
EOSINOPHIL # BLD AUTO: 0.09 X10(3) UL (ref 0–0.7)
EOSINOPHIL NFR BLD AUTO: 1.4 %
ERYTHROCYTE [DISTWIDTH] IN BLOOD BY AUTOMATED COUNT: 12.2 % (ref 11–15)
EST. AVERAGE GLUCOSE BLD GHB EST-MCNC: 120 MG/DL (ref 68–126)
FASTING STATUS PATIENT QL REPORTED: YES
GLOBULIN PLAS-MCNC: 3.4 G/DL (ref 2.8–4.4)
GLUCOSE BLD-MCNC: 96 MG/DL (ref 70–99)
HBA1C MFR BLD: 5.8 % (ref ?–5.7)
HCT VFR BLD AUTO: 42.2 %
HGB BLD-MCNC: 13.8 G/DL
IMM GRANULOCYTES # BLD AUTO: 0.01 X10(3) UL (ref 0–1)
IMM GRANULOCYTES NFR BLD: 0.2 %
LYMPHOCYTES # BLD AUTO: 1.78 X10(3) UL (ref 1–4)
LYMPHOCYTES NFR BLD AUTO: 28.3 %
MCH RBC QN AUTO: 31.8 PG (ref 26–34)
MCHC RBC AUTO-ENTMCNC: 32.7 G/DL (ref 31–37)
MCV RBC AUTO: 97.2 FL
MONOCYTES # BLD AUTO: 0.54 X10(3) UL (ref 0.1–1)
MONOCYTES NFR BLD AUTO: 8.6 %
NEUTROPHILS # BLD AUTO: 3.81 X10 (3) UL (ref 1.5–7.7)
NEUTROPHILS # BLD AUTO: 3.81 X10(3) UL (ref 1.5–7.7)
NEUTROPHILS NFR BLD AUTO: 60.7 %
OSMOLALITY SERPL CALC.SUM OF ELEC: 293 MOSM/KG (ref 275–295)
PLATELET # BLD AUTO: 259 10(3)UL (ref 150–450)
POTASSIUM SERPL-SCNC: 4.3 MMOL/L (ref 3.5–5.1)
PROT SERPL-MCNC: 7.3 G/DL (ref 6.4–8.2)
RBC # BLD AUTO: 4.34 X10(6)UL
SODIUM SERPL-SCNC: 141 MMOL/L (ref 136–145)
TSI SER-ACNC: 2.21 MIU/ML (ref 0.36–3.74)
VIT B12 SERPL-MCNC: 530 PG/ML (ref 193–986)
WBC # BLD AUTO: 6.3 X10(3) UL (ref 4–11)

## 2022-02-25 PROCEDURE — 84443 ASSAY THYROID STIM HORMONE: CPT

## 2022-02-25 PROCEDURE — 36415 COLL VENOUS BLD VENIPUNCTURE: CPT

## 2022-02-25 PROCEDURE — 80053 COMPREHEN METABOLIC PANEL: CPT

## 2022-02-25 PROCEDURE — 85025 COMPLETE CBC W/AUTO DIFF WBC: CPT

## 2022-02-25 PROCEDURE — 3077F SYST BP >= 140 MM HG: CPT | Performed by: INTERNAL MEDICINE

## 2022-02-25 PROCEDURE — 83036 HEMOGLOBIN GLYCOSYLATED A1C: CPT

## 2022-02-25 PROCEDURE — 99214 OFFICE O/P EST MOD 30 MIN: CPT | Performed by: INTERNAL MEDICINE

## 2022-02-25 PROCEDURE — 82607 VITAMIN B-12: CPT

## 2022-02-25 PROCEDURE — 86235 NUCLEAR ANTIGEN ANTIBODY: CPT

## 2022-02-25 PROCEDURE — 3078F DIAST BP <80 MM HG: CPT | Performed by: INTERNAL MEDICINE

## 2022-03-03 LAB
ENA SS-A AB SER QL IA: NEGATIVE
ENA SS-B AB SER QL IA: NEGATIVE

## 2022-03-26 ENCOUNTER — HOSPITAL ENCOUNTER (OUTPATIENT)
Dept: ULTRASOUND IMAGING | Age: 72
Discharge: HOME | End: 2022-03-26
Attending: INTERNAL MEDICINE
Payer: MEDICARE

## 2022-03-26 ENCOUNTER — APPOINTMENT (OUTPATIENT)
Dept: ULTRASOUND IMAGING | Age: 72
End: 2022-03-26
Attending: INTERNAL MEDICINE
Payer: MEDICARE

## 2022-03-26 DIAGNOSIS — R10.13 EPIGASTRIC PAIN: ICD-10-CM

## 2022-03-26 PROCEDURE — 76705 ECHO EXAM OF ABDOMEN: CPT | Performed by: INTERNAL MEDICINE

## 2022-04-15 ENCOUNTER — PATIENT MESSAGE (OUTPATIENT)
Dept: INTERNAL MEDICINE CLINIC | Facility: CLINIC | Age: 72
End: 2022-04-15

## 2022-04-16 RX ORDER — ALPRAZOLAM 0.25 MG/1
0.25 TABLET ORAL NIGHTLY PRN
Qty: 90 TABLET | Refills: 1 | Status: SHIPPED | OUTPATIENT
Start: 2022-04-16

## 2022-04-16 NOTE — TELEPHONE ENCOUNTER
Please review; protocol failed/ no protocol. Requested Prescriptions   Pending Prescriptions Disp Refills    ALPRAZolam 0.25 MG Oral Tab 90 tablet 1     Sig: Take 1 tablet (0.25 mg total) by mouth nightly as needed for Sleep.         There is no refill protocol information for this order           Recent Outpatient Visits              1 month ago Sicca syndrome [facility])    [facility], [facility], Lenore Carlson MD    Office Visit    3 months ago COVID-19 virus infection    [facility]    Nurse Only    3 months ago COVID-19 virus infection    [facility], [address], [place], Jo Ann Dillon MD    Telemedicine    4 months ago Encounter for gynecological examination    [address] [facility], Lenore Carlson MD    Office Visit    4 months ago Excessive ear wax, right    [facility] [place], [address], Meera Razo MD    Office Visit            Future Appointments         Provider Department Appt Notes    In 1 month Paco Corona, [facility] EGD screen, policy informed pt last seen Dr. Cathy Fairchild 2019

## 2022-04-16 NOTE — TELEPHONE ENCOUNTER
From: Dodie Lovelace  To: Hoang Almazan MD  Sent: 4/15/2022 8:01 AM CDT  Subject: Prescription refill    Can the doctor please put in an order for a refill for my Xanax . 25 prescription? [facility] [address] in [place].  Thank you!~

## 2022-05-17 ENCOUNTER — TELEPHONE (OUTPATIENT)
Dept: OTOLARYNGOLOGY | Facility: CLINIC | Age: 72
End: 2022-05-17

## 2022-05-17 NOTE — TELEPHONE ENCOUNTER
Pt was last seen in November of 2021 states she has lump behind ear and asking what can MD do for it CT scan what is this something MD can handle please advise

## 2022-05-18 NOTE — TELEPHONE ENCOUNTER
Spoke to patient and offered appointment. Patient declined and stated she has other appointments that may be able to resolve the bump behind her ear. Will call back to schedule if needed.

## 2022-06-01 ENCOUNTER — OFFICE VISIT (OUTPATIENT)
Dept: GASTROENTEROLOGY | Facility: CLINIC | Age: 72
End: 2022-06-01
Payer: COMMERCIAL

## 2022-06-01 ENCOUNTER — HOSPITAL ENCOUNTER (OUTPATIENT)
Dept: GENERAL RADIOLOGY | Age: 72
Discharge: HOME | End: 2022-06-01
Attending: NURSE PRACTITIONER
Payer: MEDICARE

## 2022-06-01 VITALS
SYSTOLIC BLOOD PRESSURE: 105 MMHG | BODY MASS INDEX: 20.58 KG/M2 | DIASTOLIC BLOOD PRESSURE: 65 MMHG | WEIGHT: 109 LBS | HEART RATE: 80 BPM | HEIGHT: 61 IN

## 2022-06-01 DIAGNOSIS — K44.9 HIATAL HERNIA WITH GERD: Primary | ICD-10-CM

## 2022-06-01 DIAGNOSIS — K21.9 HIATAL HERNIA WITH GERD: Primary | ICD-10-CM

## 2022-06-01 DIAGNOSIS — K59.00 CONSTIPATION, UNSPECIFIED CONSTIPATION TYPE: ICD-10-CM

## 2022-06-01 PROCEDURE — 74018 RADEX ABDOMEN 1 VIEW: CPT | Performed by: NURSE PRACTITIONER

## 2022-06-01 PROCEDURE — 99214 OFFICE O/P EST MOD 30 MIN: CPT | Performed by: NURSE PRACTITIONER

## 2022-06-01 PROCEDURE — 3074F SYST BP LT 130 MM HG: CPT | Performed by: NURSE PRACTITIONER

## 2022-06-01 PROCEDURE — 3078F DIAST BP <80 MM HG: CPT | Performed by: NURSE PRACTITIONER

## 2022-06-01 PROCEDURE — 3008F BODY MASS INDEX DOCD: CPT | Performed by: NURSE PRACTITIONER

## 2022-06-01 RX ORDER — CETIRIZINE HYDROCHLORIDE 10 MG/1
CAPSULE, LIQUID FILLED ORAL DAILY
COMMUNITY

## 2022-06-01 NOTE — PATIENT INSTRUCTIONS
-Schedule EGD w/ possible biopsy w/Dr. Steffi Hess w/ [facility] or MAC  Dx: GERD  -Eligible for NE: Yes r/t BMI < 40  -Anti-platelets and anti-coagulants: ASA - continue as prescribed   -Diabetes meds: none    ** If MAC:    - HOLD ACE/ARBs the night before and/or the day of the procedure(s) - N/A   - NO alcohol, recreational drugs nor erectile dysfunction medications 24 hours before procedure(s)   - NO herbal supplements or weight loss medications (phentermine/Vyvanse/Adderall) x 7 days prior to the procedure(s)    ** If MAC @ [facility] or [facility] - continue all medications as prescribed    ** COVID-19 testing required 72 hours prior to procedure    **Patient to follow-up with any new medical history/medications prior to procedure**

## 2022-08-04 ENCOUNTER — TELEPHONE (OUTPATIENT)
Dept: GASTROENTEROLOGY | Facility: CLINIC | Age: 72
End: 2022-08-04

## 2022-08-04 NOTE — TELEPHONE ENCOUNTER
Patient canceled on epic. No documentation on file for original procedure so no need to cxl on case tabs.

## 2022-08-04 NOTE — TELEPHONE ENCOUNTER
Pt called to cancel 8/16/22 EGD. She is feeling better and will call if she would like to reschedule.

## 2022-10-06 ENCOUNTER — OFFICE VISIT (OUTPATIENT)
Dept: FAMILY MEDICINE CLINIC | Facility: CLINIC | Age: 72
End: 2022-10-06
Payer: COMMERCIAL

## 2022-10-06 VITALS
SYSTOLIC BLOOD PRESSURE: 114 MMHG | TEMPERATURE: 98 F | WEIGHT: 106 LBS | HEIGHT: 61 IN | DIASTOLIC BLOOD PRESSURE: 66 MMHG | HEART RATE: 81 BPM | BODY MASS INDEX: 20.01 KG/M2

## 2022-10-06 DIAGNOSIS — M54.59 OTHER LOW BACK PAIN: Primary | ICD-10-CM

## 2022-10-06 PROCEDURE — 99203 OFFICE O/P NEW LOW 30 MIN: CPT | Performed by: FAMILY MEDICINE

## 2022-10-06 PROCEDURE — 1125F AMNT PAIN NOTED PAIN PRSNT: CPT | Performed by: FAMILY MEDICINE

## 2022-10-06 PROCEDURE — 3008F BODY MASS INDEX DOCD: CPT | Performed by: FAMILY MEDICINE

## 2022-10-06 PROCEDURE — 3078F DIAST BP <80 MM HG: CPT | Performed by: FAMILY MEDICINE

## 2022-10-06 PROCEDURE — 3074F SYST BP LT 130 MM HG: CPT | Performed by: FAMILY MEDICINE

## 2022-10-13 ENCOUNTER — OFFICE VISIT (OUTPATIENT)
Dept: FAMILY MEDICINE CLINIC | Facility: CLINIC | Age: 72
End: 2022-10-13
Payer: COMMERCIAL

## 2022-10-13 VITALS
HEIGHT: 61 IN | SYSTOLIC BLOOD PRESSURE: 117 MMHG | HEART RATE: 63 BPM | WEIGHT: 108 LBS | TEMPERATURE: 98 F | DIASTOLIC BLOOD PRESSURE: 60 MMHG | BODY MASS INDEX: 20.39 KG/M2

## 2022-10-13 DIAGNOSIS — M54.59 OTHER LOW BACK PAIN: Primary | ICD-10-CM

## 2022-10-13 PROCEDURE — 97811 ACUP 1/> W/O ESTIM EA ADD 15: CPT | Performed by: FAMILY MEDICINE

## 2022-10-13 PROCEDURE — 3008F BODY MASS INDEX DOCD: CPT | Performed by: FAMILY MEDICINE

## 2022-10-13 PROCEDURE — 1125F AMNT PAIN NOTED PAIN PRSNT: CPT | Performed by: FAMILY MEDICINE

## 2022-10-13 PROCEDURE — 3074F SYST BP LT 130 MM HG: CPT | Performed by: FAMILY MEDICINE

## 2022-10-13 PROCEDURE — 3078F DIAST BP <80 MM HG: CPT | Performed by: FAMILY MEDICINE

## 2022-10-13 PROCEDURE — 97810 ACUP 1/> WO ESTIM 1ST 15 MIN: CPT | Performed by: FAMILY MEDICINE

## 2022-10-13 NOTE — PROCEDURES
Patient tolerated procedure well in excess of 30 minutes was spent with patient   There was no complications all needles were removed.    Patient was advised to rest today and f/u in 1-2 weeks  tiffanie treatment   Loyd, shira

## 2022-10-20 ENCOUNTER — OFFICE VISIT (OUTPATIENT)
Dept: FAMILY MEDICINE CLINIC | Facility: CLINIC | Age: 72
End: 2022-10-20
Payer: COMMERCIAL

## 2022-10-20 VITALS
SYSTOLIC BLOOD PRESSURE: 111 MMHG | WEIGHT: 109 LBS | RESPIRATION RATE: 16 BRPM | HEART RATE: 75 BPM | BODY MASS INDEX: 20.58 KG/M2 | TEMPERATURE: 98 F | HEIGHT: 61 IN | DIASTOLIC BLOOD PRESSURE: 58 MMHG

## 2022-10-20 DIAGNOSIS — M54.59 OTHER LOW BACK PAIN: Primary | ICD-10-CM

## 2022-10-20 PROCEDURE — 3074F SYST BP LT 130 MM HG: CPT | Performed by: FAMILY MEDICINE

## 2022-10-20 PROCEDURE — 97811 ACUP 1/> W/O ESTIM EA ADD 15: CPT | Performed by: FAMILY MEDICINE

## 2022-10-20 PROCEDURE — 97813 ACUP 1/> W/ESTIM 1ST 15 MIN: CPT | Performed by: FAMILY MEDICINE

## 2022-10-20 PROCEDURE — 3078F DIAST BP <80 MM HG: CPT | Performed by: FAMILY MEDICINE

## 2022-10-20 PROCEDURE — 1125F AMNT PAIN NOTED PAIN PRSNT: CPT | Performed by: FAMILY MEDICINE

## 2022-10-20 PROCEDURE — 3008F BODY MASS INDEX DOCD: CPT | Performed by: FAMILY MEDICINE

## 2022-10-20 NOTE — PROCEDURES
duke davis/carrie anand low frequency stim   Ear points   Patient tolerated procedure well in excess of 30 minutes was spent with patient   There was no complications all needles were removed.    Patient was advised to rest today and f/u in 1-2 weeks

## 2022-11-03 ENCOUNTER — HOSPITAL ENCOUNTER (OUTPATIENT)
Dept: MAMMOGRAPHY | Age: 72
Discharge: HOME | End: 2022-11-03
Attending: INTERNAL MEDICINE
Payer: MEDICARE

## 2022-11-03 DIAGNOSIS — Z12.31 BREAST CANCER SCREENING BY MAMMOGRAM: ICD-10-CM

## 2022-11-03 PROCEDURE — 77063 BREAST TOMOSYNTHESIS BI: CPT | Performed by: INTERNAL MEDICINE

## 2022-11-03 PROCEDURE — 77067 SCR MAMMO BI INCL CAD: CPT | Performed by: INTERNAL MEDICINE

## 2022-11-04 ENCOUNTER — OFFICE VISIT (OUTPATIENT)
Dept: INTERNAL MEDICINE CLINIC | Facility: CLINIC | Age: 72
End: 2022-11-04
Payer: COMMERCIAL

## 2022-11-04 VITALS
WEIGHT: 108 LBS | DIASTOLIC BLOOD PRESSURE: 70 MMHG | HEIGHT: 61 IN | OXYGEN SATURATION: 97 % | BODY MASS INDEX: 20.39 KG/M2 | HEART RATE: 65 BPM | SYSTOLIC BLOOD PRESSURE: 148 MMHG

## 2022-11-04 DIAGNOSIS — R73.9 BLOOD GLUCOSE ELEVATED: ICD-10-CM

## 2022-11-04 DIAGNOSIS — E78.49 OTHER HYPERLIPIDEMIA: ICD-10-CM

## 2022-11-04 DIAGNOSIS — M79.672 BILATERAL FOOT PAIN: Primary | ICD-10-CM

## 2022-11-04 DIAGNOSIS — M79.671 BILATERAL FOOT PAIN: Primary | ICD-10-CM

## 2022-11-04 DIAGNOSIS — Z13.0 SCREENING FOR DEFICIENCY ANEMIA: ICD-10-CM

## 2022-11-04 PROCEDURE — 3077F SYST BP >= 140 MM HG: CPT | Performed by: NURSE PRACTITIONER

## 2022-11-04 PROCEDURE — 1125F AMNT PAIN NOTED PAIN PRSNT: CPT | Performed by: NURSE PRACTITIONER

## 2022-11-04 PROCEDURE — 99213 OFFICE O/P EST LOW 20 MIN: CPT | Performed by: NURSE PRACTITIONER

## 2022-11-04 PROCEDURE — 3008F BODY MASS INDEX DOCD: CPT | Performed by: NURSE PRACTITIONER

## 2022-11-04 PROCEDURE — 3078F DIAST BP <80 MM HG: CPT | Performed by: NURSE PRACTITIONER

## 2022-12-01 ENCOUNTER — NURSE TRIAGE (OUTPATIENT)
Dept: INTERNAL MEDICINE CLINIC | Facility: CLINIC | Age: 72
End: 2022-12-01

## 2022-12-01 NOTE — TELEPHONE ENCOUNTER
Patient stated that started with a cough, fever,stuffy nose, post nasal drip, chest tightness, no appetite, diarrhea on 11/24/2022. Had fever only about 2 days 100F. Has not had a fever for over 48 hours. Diarrhea gone now. Starting to get her appetite back. Patient is currently in [place]. Went to urgent care yesterday and they diagnosed her with the flu- thinks influenza A and bronchitis. Was past the time period so they were not able to prescribe tamiflu. Chest x-ray was normal. [place] wheezing and had chest tightness, so they did a nebulizer treatment. Was advised to take mucinex and prescribed albuterol inhaler and medrol dose pack. Patient stated that feeling better today after the nebulizer. Steaming in the shower helps as well. States actually slept at night without coughing. Has not had to use the albuterol inhaler since not having chest tightness. States mucinex seems to be helping dry things up. Will still be in [place] for another 7 days before [place]. Patient reluctant to take the medrol dose pack since in the past could not sleep and it aggravated her GERD. Wanted to see if ok to hold off on taking the medrol dose pack? Advised patient that if feeling better today, no wheezing or shortness of breath, no chest tightness would be ok to hold off on the medrol dose pack. If feels symptoms are getting worse then to take the medrol dose pack. Patient agreed.

## 2022-12-09 ENCOUNTER — APPOINTMENT (OUTPATIENT)
Dept: GENERAL RADIOLOGY | Age: 72
End: 2022-12-09
Payer: MEDICARE

## 2022-12-09 ENCOUNTER — HOSPITAL ENCOUNTER (OUTPATIENT)
Age: 72
Discharge: HOME | End: 2022-12-09
Payer: MEDICARE

## 2022-12-09 VITALS
HEART RATE: 82 BPM | TEMPERATURE: 98 F | OXYGEN SATURATION: 99 % | RESPIRATION RATE: 20 BRPM | SYSTOLIC BLOOD PRESSURE: 144 MMHG | DIASTOLIC BLOOD PRESSURE: 70 MMHG

## 2022-12-09 DIAGNOSIS — R05.8 POST-VIRAL COUGH SYNDROME: Primary | ICD-10-CM

## 2022-12-09 PROCEDURE — 71046 X-RAY EXAM CHEST 2 VIEWS: CPT

## 2022-12-09 PROCEDURE — 99213 OFFICE O/P EST LOW 20 MIN: CPT

## 2022-12-09 RX ORDER — BENZONATATE 100 MG/1
100 CAPSULE ORAL 3 TIMES DAILY PRN
Qty: 30 CAPSULE | Refills: 0 | Status: SHIPPED | OUTPATIENT
Start: 2022-12-09 | End: 2023-01-08

## 2022-12-09 NOTE — ED INITIAL ASSESSMENT (HPI)
Patient hx with flu and bronchitis 11/30 while at a urgent care in [place]. cxr done at that time and did not show a pneumonia. Patient given a nebulizer treatment and prescribed prednisone and an inhaler.   Patient did not start the course of prednisone as she has suffered side effects from prednisone in the past.  States the cough persists

## 2022-12-09 NOTE — DISCHARGE INSTRUCTIONS
Your chest x-ray did not show any signs of pneumonia there are no signs of infection on physical exam.  This is likely a postviral cough. I sent a prescription for Tessalon Perles for the cough, please remember these may make you drowsy so call when you take them. You can also use honey for the cough. Please be sure to drink plenty of fluids, use Tylenol and Motrin for pain or fever. Use Flonase and plain Mucinex for congestion. If you develop any respiratory complaints, fever that does not improve with medications or any other concerning complaints you should go to the emergency department. Otherwise follow up with your primary care provider.

## 2023-01-27 ENCOUNTER — LAB ENCOUNTER (OUTPATIENT)
Dept: LAB | Age: 73
End: 2023-01-27
Attending: NURSE PRACTITIONER
Payer: MEDICARE

## 2023-01-27 DIAGNOSIS — E78.49 OTHER HYPERLIPIDEMIA: ICD-10-CM

## 2023-01-27 DIAGNOSIS — R73.9 BLOOD GLUCOSE ELEVATED: ICD-10-CM

## 2023-01-27 DIAGNOSIS — Z13.0 SCREENING FOR DEFICIENCY ANEMIA: ICD-10-CM

## 2023-01-27 LAB
ALBUMIN SERPL-MCNC: 3.5 G/DL (ref 3.4–5)
ALBUMIN/GLOB SERPL: 0.9 {RATIO} (ref 1–2)
ALP LIVER SERPL-CCNC: 76 U/L
ALT SERPL-CCNC: 29 U/L
ANION GAP SERPL CALC-SCNC: 5 MMOL/L (ref 0–18)
AST SERPL-CCNC: 25 U/L (ref 15–37)
BASOPHILS # BLD AUTO: 0.07 X10(3) UL (ref 0–0.2)
BASOPHILS NFR BLD AUTO: 1.1 %
BILIRUB SERPL-MCNC: 0.9 MG/DL (ref 0.1–2)
BUN BLD-MCNC: 16 MG/DL (ref 7–18)
BUN/CREAT SERPL: 18.8 (ref 10–20)
CALCIUM BLD-MCNC: 9.5 MG/DL (ref 8.5–10.1)
CHLORIDE SERPL-SCNC: 109 MMOL/L (ref 98–112)
CHOLEST SERPL-MCNC: 244 MG/DL (ref ?–200)
CO2 SERPL-SCNC: 28 MMOL/L (ref 21–32)
CREAT BLD-MCNC: 0.85 MG/DL
DEPRECATED RDW RBC AUTO: 44.9 FL (ref 35.1–46.3)
EOSINOPHIL # BLD AUTO: 0.13 X10(3) UL (ref 0–0.7)
EOSINOPHIL NFR BLD AUTO: 2 %
ERYTHROCYTE [DISTWIDTH] IN BLOOD BY AUTOMATED COUNT: 12.3 % (ref 11–15)
EST. AVERAGE GLUCOSE BLD GHB EST-MCNC: 114 MG/DL (ref 68–126)
FASTING PATIENT LIPID ANSWER: YES
FASTING STATUS PATIENT QL REPORTED: YES
GFR SERPLBLD BASED ON 1.73 SQ M-ARVRAT: 72 ML/MIN/1.73M2 (ref 60–?)
GLOBULIN PLAS-MCNC: 3.7 G/DL (ref 2.8–4.4)
GLUCOSE BLD-MCNC: 94 MG/DL (ref 70–99)
HBA1C MFR BLD: 5.6 % (ref ?–5.7)
HCT VFR BLD AUTO: 40.4 %
HDLC SERPL-MCNC: 88 MG/DL (ref 40–59)
HGB BLD-MCNC: 13.3 G/DL
IMM GRANULOCYTES # BLD AUTO: 0.01 X10(3) UL (ref 0–1)
IMM GRANULOCYTES NFR BLD: 0.2 %
LDLC SERPL CALC-MCNC: 140 MG/DL (ref ?–100)
LYMPHOCYTES # BLD AUTO: 1.57 X10(3) UL (ref 1–4)
LYMPHOCYTES NFR BLD AUTO: 23.8 %
MCH RBC QN AUTO: 32.6 PG (ref 26–34)
MCHC RBC AUTO-ENTMCNC: 32.9 G/DL (ref 31–37)
MCV RBC AUTO: 99 FL
MONOCYTES # BLD AUTO: 0.67 X10(3) UL (ref 0.1–1)
MONOCYTES NFR BLD AUTO: 10.2 %
NEUTROPHILS # BLD AUTO: 4.15 X10 (3) UL (ref 1.5–7.7)
NEUTROPHILS # BLD AUTO: 4.15 X10(3) UL (ref 1.5–7.7)
NEUTROPHILS NFR BLD AUTO: 62.7 %
NONHDLC SERPL-MCNC: 156 MG/DL (ref ?–130)
OSMOLALITY SERPL CALC.SUM OF ELEC: 295 MOSM/KG (ref 275–295)
PLATELET # BLD AUTO: 247 10(3)UL (ref 150–450)
POTASSIUM SERPL-SCNC: 4 MMOL/L (ref 3.5–5.1)
PROT SERPL-MCNC: 7.2 G/DL (ref 6.4–8.2)
RBC # BLD AUTO: 4.08 X10(6)UL
SODIUM SERPL-SCNC: 142 MMOL/L (ref 136–145)
TRIGL SERPL-MCNC: 93 MG/DL (ref 30–149)
TSI SER-ACNC: 1.71 MIU/ML (ref 0.36–3.74)
VLDLC SERPL CALC-MCNC: 17 MG/DL (ref 0–30)
WBC # BLD AUTO: 6.6 X10(3) UL (ref 4–11)

## 2023-01-27 PROCEDURE — 83036 HEMOGLOBIN GLYCOSYLATED A1C: CPT

## 2023-01-27 PROCEDURE — 85025 COMPLETE CBC W/AUTO DIFF WBC: CPT

## 2023-01-27 PROCEDURE — 36415 COLL VENOUS BLD VENIPUNCTURE: CPT

## 2023-01-27 PROCEDURE — 84443 ASSAY THYROID STIM HORMONE: CPT

## 2023-01-27 PROCEDURE — 80061 LIPID PANEL: CPT

## 2023-01-27 PROCEDURE — 80053 COMPREHEN METABOLIC PANEL: CPT

## 2023-01-30 ENCOUNTER — LAB ENCOUNTER (OUTPATIENT)
Dept: LAB | Age: 73
End: 2023-01-30
Attending: INTERNAL MEDICINE
Payer: MEDICARE

## 2023-01-30 ENCOUNTER — OFFICE VISIT (OUTPATIENT)
Dept: INTERNAL MEDICINE CLINIC | Facility: CLINIC | Age: 73
End: 2023-01-30

## 2023-01-30 ENCOUNTER — PATIENT MESSAGE (OUTPATIENT)
Dept: INTERNAL MEDICINE CLINIC | Facility: CLINIC | Age: 73
End: 2023-01-30

## 2023-01-30 ENCOUNTER — TELEPHONE (OUTPATIENT)
Dept: INTERNAL MEDICINE CLINIC | Facility: CLINIC | Age: 73
End: 2023-01-30

## 2023-01-30 VITALS
WEIGHT: 110 LBS | BODY MASS INDEX: 20.77 KG/M2 | HEART RATE: 71 BPM | DIASTOLIC BLOOD PRESSURE: 76 MMHG | HEIGHT: 61 IN | SYSTOLIC BLOOD PRESSURE: 120 MMHG

## 2023-01-30 DIAGNOSIS — K21.00 GASTROESOPHAGEAL REFLUX DISEASE WITH ESOPHAGITIS WITHOUT HEMORRHAGE: ICD-10-CM

## 2023-01-30 DIAGNOSIS — Z00.00 MEDICARE ANNUAL WELLNESS VISIT, INITIAL: Primary | ICD-10-CM

## 2023-01-30 DIAGNOSIS — M79.672 CHRONIC PAIN OF BOTH FEET: ICD-10-CM

## 2023-01-30 DIAGNOSIS — M85.89 OSTEOPENIA OF MULTIPLE SITES: ICD-10-CM

## 2023-01-30 DIAGNOSIS — M79.671 CHRONIC PAIN OF BOTH FEET: ICD-10-CM

## 2023-01-30 DIAGNOSIS — G89.29 CHRONIC PAIN OF BOTH FEET: ICD-10-CM

## 2023-01-30 DIAGNOSIS — R39.9 URINARY SYMPTOM OR SIGN: ICD-10-CM

## 2023-01-30 PROBLEM — M19.072 PRIMARY OSTEOARTHRITIS OF BOTH FEET: Status: ACTIVE | Noted: 2023-01-30

## 2023-01-30 PROBLEM — R94.39 ABNORMAL STRESS TEST: Status: RESOLVED | Noted: 2019-04-11 | Resolved: 2023-01-30

## 2023-01-30 PROBLEM — M54.50 BILATERAL LOW BACK PAIN WITHOUT SCIATICA: Status: RESOLVED | Noted: 2020-02-04 | Resolved: 2023-01-30

## 2023-01-30 PROBLEM — M19.071 PRIMARY OSTEOARTHRITIS OF BOTH FEET: Status: ACTIVE | Noted: 2023-01-30

## 2023-01-30 LAB
BILIRUB UR QL: NEGATIVE
CLARITY UR: CLEAR
COLOR UR: YELLOW
GLUCOSE UR-MCNC: NEGATIVE MG/DL
HGB UR QL STRIP.AUTO: NEGATIVE
KETONES UR-MCNC: NEGATIVE MG/DL
NITRITE UR QL STRIP.AUTO: NEGATIVE
PH UR: 6 [PH] (ref 5–8)
PROT UR-MCNC: NEGATIVE MG/DL
SP GR UR STRIP: 1.01 (ref 1–1.03)
UROBILINOGEN UR STRIP-ACNC: 0.2

## 2023-01-30 PROCEDURE — 81015 MICROSCOPIC EXAM OF URINE: CPT | Performed by: INTERNAL MEDICINE

## 2023-01-30 PROCEDURE — 87086 URINE CULTURE/COLONY COUNT: CPT | Performed by: INTERNAL MEDICINE

## 2023-01-30 PROCEDURE — 81001 URINALYSIS AUTO W/SCOPE: CPT | Performed by: INTERNAL MEDICINE

## 2023-01-30 NOTE — TELEPHONE ENCOUNTER
Onsite Clinical - please call lab, clarification needed. Dr. Frandy Edwards - should lab use UA with Reflex UC? Or separate orders for UA and then a Urine Culture. Received call from [facility], [address] [place] [address]. Ext [id number]  Patient's date of birth and full name both confirmed. Requesting to clarify orders. Appears to be a duplicate. Needs clarification from MD.   Lab tried calling onsite clinical - but no answer. UA with Culture Reflex. But also: an order Urinalysis and an order for Urine Culture.

## 2023-01-31 ENCOUNTER — PATIENT MESSAGE (OUTPATIENT)
Dept: INTERNAL MEDICINE CLINIC | Facility: CLINIC | Age: 73
End: 2023-01-31

## 2023-01-31 NOTE — TELEPHONE ENCOUNTER
From: Sarahi Diaz  To: Kitty Laura MD  Sent: 1/30/2023 4:35 PM CST  Subject: Podiatrist referral    When I was in the office today, Jan. 30, Dr. Luis Barnard said she would put in a referral to a podiatrist. Can you please tell me the name of the podiatrist? I've forgotten. I believe it was a woman. Thank you!

## 2023-01-31 NOTE — TELEPHONE ENCOUNTER
From: Tessa Barlow  To: Natanael Law MD  Sent: 1/31/2023 11:25 AM CST  Subject: Alprazolam prescription     Please let me know if a prescription for alprazolam was sent to my new pharmacy by Dr. Debbi Schmidt. My pharmacy is now [facility] and [address]  in [place]. When I called yesterday, they said they didn't have it. I have a visit with Dr. Debbi Schmidt Monday and she said she would have it called in. Just want to make sure [facility]. [facility] [address] in [place] has closed. Thank you! Requesting refill for Tresiba sent to [facility] in [place]

## 2023-02-01 RX ORDER — ALPRAZOLAM 0.25 MG/1
0.25 TABLET ORAL NIGHTLY PRN
Qty: 90 TABLET | Refills: 1 | Status: SHIPPED | OUTPATIENT
Start: 2023-02-01

## 2023-02-01 NOTE — TELEPHONE ENCOUNTER
Please review; protocol failed/no protocol. Requested Prescriptions   Pending Prescriptions Disp Refills    ALPRAZolam 0.25 MG Oral Tab 90 tablet 1     Sig: Take 1 tablet (0.25 mg total) by mouth nightly as needed for Sleep.        There is no refill protocol information for this order           Recent Outpatient Visits              2 days ago Medicare [facility]    Office Visit    2 months ago Bilateral foot pain    [facility] APRN    Office Visit    3 months ago Other low back pain    Dereje Badillo MD    Office Visit    3 months ago Other low back pain    Dereje Badillo MD    Office Visit    3 months ago Other low back pain    Dereje Badillo MD    Office Visit             Future Appointments         Provider Department Appt Notes    In 3 weeks [facility]; [address] [facility]

## 2023-02-13 ENCOUNTER — OFFICE VISIT (OUTPATIENT)
Dept: UROLOGY | Facility: HOSPITAL | Age: 73
End: 2023-02-13
Attending: OBSTETRICS & GYNECOLOGY
Payer: MEDICARE

## 2023-02-13 VITALS
DIASTOLIC BLOOD PRESSURE: 62 MMHG | RESPIRATION RATE: 18 BRPM | HEIGHT: 61 IN | WEIGHT: 110 LBS | SYSTOLIC BLOOD PRESSURE: 130 MMHG | BODY MASS INDEX: 20.77 KG/M2

## 2023-02-13 DIAGNOSIS — N81.84 PELVIC MUSCLE WASTING: ICD-10-CM

## 2023-02-13 DIAGNOSIS — R35.1 NOCTURIA: ICD-10-CM

## 2023-02-13 DIAGNOSIS — R35.0 URINARY FREQUENCY: Primary | ICD-10-CM

## 2023-02-13 DIAGNOSIS — N94.10 DYSPAREUNIA, FEMALE: ICD-10-CM

## 2023-02-13 DIAGNOSIS — N95.2 POSTMENOPAUSAL ATROPHIC VAGINITIS: ICD-10-CM

## 2023-02-13 LAB
BILIRUB UR QL: NEGATIVE
CLARITY UR: CLEAR
COLOR UR: YELLOW
CONTROL RUN WITHIN 24 HOURS?: YES
GLUCOSE UR-MCNC: NEGATIVE MG/DL
HGB UR QL STRIP.AUTO: NEGATIVE
KETONES UR-MCNC: NEGATIVE MG/DL
LEUKOCYTE ESTERASE UR QL STRIP.AUTO: NEGATIVE
LEUKOCYTE ESTERASE URINE: NEGATIVE
NITRITE UR QL STRIP.AUTO: NEGATIVE
NITRITE URINE: NEGATIVE
PH UR: 5.5 [PH] (ref 5–8)
PROT UR-MCNC: NEGATIVE MG/DL
SP GR UR STRIP: 1.02 (ref 1–1.03)
UROBILINOGEN UR STRIP-ACNC: 0.2

## 2023-02-13 PROCEDURE — 87086 URINE CULTURE/COLONY COUNT: CPT | Performed by: OBSTETRICS & GYNECOLOGY

## 2023-02-13 PROCEDURE — 99212 OFFICE O/P EST SF 10 MIN: CPT

## 2023-02-13 PROCEDURE — 81003 URINALYSIS AUTO W/O SCOPE: CPT | Performed by: OBSTETRICS & GYNECOLOGY

## 2023-02-13 PROCEDURE — 81002 URINALYSIS NONAUTO W/O SCOPE: CPT | Performed by: OBSTETRICS & GYNECOLOGY

## 2023-02-13 NOTE — PATIENT INSTRUCTIONS
[facility] [address] [place] [address] PELVIC MEDICINE    Fingertip Application Method for Estrogen Vaginal Cream    1. Wash you hands with soap and water and dry thoroughly. 2.  Squeeze out enough cream from the tube to cover 1/4 of your index finger. 3.  Locate the vaginal opening (See figure 2). Immediately above the vagina is the urethra (a small opening where urine is eliminated from your body). The urethra may not be as easily identified as the vagina because the opening is much smaller, however, use the diagram to determine its approximate location. 4.  Carefully spread the cream onto the external vaginal/urethral area (See figure 2). As the cream is spread, some may be gently inserted into the vagina; however, it is not necessary to push the cream high into your vagina.

## 2023-02-23 ENCOUNTER — HOSPITAL ENCOUNTER (OUTPATIENT)
Dept: BONE DENSITY | Age: 73
Discharge: HOME | End: 2023-02-23
Attending: INTERNAL MEDICINE
Payer: MEDICARE

## 2023-02-23 DIAGNOSIS — M85.89 OSTEOPENIA OF MULTIPLE SITES: ICD-10-CM

## 2023-02-23 PROCEDURE — 77080 DXA BONE DENSITY AXIAL: CPT | Performed by: INTERNAL MEDICINE

## 2023-03-01 ENCOUNTER — TELEPHONE (OUTPATIENT)
Dept: INTERNAL MEDICINE CLINIC | Facility: CLINIC | Age: 73
End: 2023-03-01

## 2023-03-01 NOTE — TELEPHONE ENCOUNTER
Patient calling regarding a letter she received about get tests and referral done. Patient calling to advise she has already had her labs and bone density done. She just need to get the Podiatry Referral done.

## 2023-06-02 ENCOUNTER — OFFICE VISIT (OUTPATIENT)
Dept: UROLOGY | Facility: HOSPITAL | Age: 73
End: 2023-06-02
Attending: OBSTETRICS & GYNECOLOGY
Payer: MEDICARE

## 2023-06-02 VITALS — RESPIRATION RATE: 18 BRPM | BODY MASS INDEX: 20.77 KG/M2 | HEIGHT: 61 IN | WEIGHT: 110 LBS

## 2023-06-02 DIAGNOSIS — R35.0 URINARY FREQUENCY: Primary | ICD-10-CM

## 2023-06-02 DIAGNOSIS — N95.2 POSTMENOPAUSAL ATROPHIC VAGINITIS: ICD-10-CM

## 2023-06-02 DIAGNOSIS — R35.1 NOCTURIA: ICD-10-CM

## 2023-06-02 DIAGNOSIS — N81.84 PELVIC MUSCLE WASTING: ICD-10-CM

## 2023-06-02 DIAGNOSIS — N94.10 DYSPAREUNIA, FEMALE: ICD-10-CM

## 2023-06-02 PROCEDURE — 99212 OFFICE O/P EST SF 10 MIN: CPT

## 2023-08-01 ENCOUNTER — NURSE TRIAGE (OUTPATIENT)
Dept: INTERNAL MEDICINE CLINIC | Facility: CLINIC | Age: 73
End: 2023-08-01

## 2023-08-01 NOTE — TELEPHONE ENCOUNTER
Action Requested: Summary for Provider     []  Critical Lab, Recommendations Needed  [] Need Additional Advice  []   FYI    []   Need Orders  [] Need Medications Sent to Pharmacy  []  Other     SUMMARY: Pt reports thumb pain persisting, denies swelling. Was manageable with medication until now. Pt would like to be seen. Appointment scheduled with Dr. Gigi Weinstein earlier appointment. Pt can only come in Thursday or Friday. Future Appointments   Date Time Provider [place] Roman   8/3/2023 11:30 AM Maria E Macias MD [email or web address]         Reason for call: Joint Pain (thumb)  Onset: Data Unavailable                                     July 29, 2023  Chaz Jordan   to P Em Triage Support (supporting Shona Zelaya MD)         7/29/23  8:30 AM  Can Dr. Josafat Terry please recommend an orthopedic doctor in [place] or [place]? I am having thumb joint pain in my left hand. 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 Recently on vacation the left thumb joint became pretty painful. I don't know if I injured it carrying a heavy suitcase or if it is related to my arthritis. I'd like to see a specialist to have it checked. Thank you!     Reason for Disposition   Patient wants to be seen    Protocols used: Finger Pain-A-OH

## 2023-08-03 ENCOUNTER — HOSPITAL ENCOUNTER (OUTPATIENT)
Dept: GENERAL RADIOLOGY | Age: 73
Discharge: HOME | End: 2023-08-03
Attending: INTERNAL MEDICINE
Payer: MEDICARE

## 2023-08-03 ENCOUNTER — OFFICE VISIT (OUTPATIENT)
Dept: INTERNAL MEDICINE CLINIC | Facility: CLINIC | Age: 73
End: 2023-08-03

## 2023-08-03 VITALS
OXYGEN SATURATION: 97 % | RESPIRATION RATE: 16 BRPM | SYSTOLIC BLOOD PRESSURE: 123 MMHG | HEIGHT: 61 IN | BODY MASS INDEX: 20.77 KG/M2 | DIASTOLIC BLOOD PRESSURE: 73 MMHG | HEART RATE: 58 BPM | WEIGHT: 110 LBS

## 2023-08-03 DIAGNOSIS — M77.8 THUMB TENDONITIS: Primary | ICD-10-CM

## 2023-08-03 DIAGNOSIS — M77.8 THUMB TENDONITIS: ICD-10-CM

## 2023-08-03 PROCEDURE — 3008F BODY MASS INDEX DOCD: CPT | Performed by: INTERNAL MEDICINE

## 2023-08-03 PROCEDURE — 1125F AMNT PAIN NOTED PAIN PRSNT: CPT | Performed by: INTERNAL MEDICINE

## 2023-08-03 PROCEDURE — 3074F SYST BP LT 130 MM HG: CPT | Performed by: INTERNAL MEDICINE

## 2023-08-03 PROCEDURE — 1159F MED LIST DOCD IN RCRD: CPT | Performed by: INTERNAL MEDICINE

## 2023-08-03 PROCEDURE — 73140 X-RAY EXAM OF FINGER(S): CPT | Performed by: INTERNAL MEDICINE

## 2023-08-03 PROCEDURE — 99213 OFFICE O/P EST LOW 20 MIN: CPT | Performed by: INTERNAL MEDICINE

## 2023-08-03 PROCEDURE — 3078F DIAST BP <80 MM HG: CPT | Performed by: INTERNAL MEDICINE

## 2023-08-03 PROCEDURE — 1160F RVW MEDS BY RX/DR IN RCRD: CPT | Performed by: INTERNAL MEDICINE

## 2023-08-07 RX ORDER — ALPRAZOLAM 0.25 MG/1
0.25 TABLET ORAL NIGHTLY PRN
Qty: 90 TABLET | Refills: 1 | Status: SHIPPED | OUTPATIENT
Start: 2023-08-07
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
## 2023-08-07 NOTE — TELEPHONE ENCOUNTER
Per patient she needs refill to a new pharmacy on file verified for her Alprazolam     Current Outpatient Medications   Medication Sig Dispense Refill           ALPRAZolam 0.25 MG Oral Tab Take 1 tablet (0.25 mg total) by mouth nightly as needed for Sleep.  90 tablet 1

## 2023-08-08 ENCOUNTER — HOSPITAL ENCOUNTER (OUTPATIENT)
Dept: GENERAL RADIOLOGY | Age: 73
Discharge: HOME | End: 2023-08-08
Attending: PODIATRIST
Payer: MEDICARE

## 2023-08-08 ENCOUNTER — OFFICE VISIT (OUTPATIENT)
Dept: PODIATRY CLINIC | Facility: CLINIC | Age: 73
End: 2023-08-08

## 2023-08-08 DIAGNOSIS — M19.071 ARTHRITIS OF FIRST METATARSOPHALANGEAL (MTP) JOINT OF RIGHT FOOT: ICD-10-CM

## 2023-08-08 DIAGNOSIS — M20.11 HAV (HALLUX ABDUCTO VALGUS), RIGHT: ICD-10-CM

## 2023-08-08 DIAGNOSIS — M79.671 RIGHT FOOT PAIN: ICD-10-CM

## 2023-08-08 DIAGNOSIS — M20.11 HAV (HALLUX ABDUCTO VALGUS), RIGHT: Primary | ICD-10-CM

## 2023-08-08 PROCEDURE — 99203 OFFICE O/P NEW LOW 30 MIN: CPT | Performed by: PODIATRIST

## 2023-08-08 PROCEDURE — 1126F AMNT PAIN NOTED NONE PRSNT: CPT | Performed by: PODIATRIST

## 2023-08-08 PROCEDURE — 1159F MED LIST DOCD IN RCRD: CPT | Performed by: PODIATRIST

## 2023-08-08 PROCEDURE — 73630 X-RAY EXAM OF FOOT: CPT | Performed by: PODIATRIST

## 2023-08-08 RX ORDER — ALPRAZOLAM 0.25 MG/1
0.25 TABLET ORAL NIGHTLY PRN
Qty: 90 TABLET | Refills: 0 | OUTPATIENT
Start: 2023-08-08

## 2023-08-08 NOTE — PROGRESS NOTES
[facility] Podiatry  Progress Note    Chaz Jordan is a 75year old female. Patient presents with: Toe Pain: Right big toe - onset a year ago - no x-rays - had sx done on bilateral big toes a while ago - also she dropped a rack on her R foot and she still has a bump there - no pain - in the R big toe pain is rated as 3-8/10 on and off - topical or Tylenol arthritis helps with the pain         HPI:     This is a pleasant female with PMH of b/l bunion surgery/cheilectomy in 1996. In 2004 she had a second bunion procedure performed on the right foot. She presents to clinic today due to right bunion pain. She states it is painful when she walks a lot. She does wear wider shoes which helps. She has tried orthotics in the past which caused increased pain. Allergies: Dust Mites and Amoxicillin   Current Outpatient Medications   Medication Sig Dispense Refill    ALPRAZolam 0.25 MG Oral Tab Take 1 tablet (0.25 mg total) by mouth nightly as needed for Sleep. 90 tablet 1    diclofenac 1 % External Gel Use topically 4 times daily. 50 g 11    Calcium Carbonate (CALCIUM 500 OR) Take by mouth. Liquid      estradiol 0.1 MG/GM Vaginal Cream Apply intravaginally 1 g twice a week as needed 1 each 0    Vitamin D3, Cholecalciferol, 50 MCG (2000 UT) Oral Cap       Alum Hydroxide-Mag Carbonate (GAVISCON OR)       LOW-DOSE ASPIRIN OR       Melatonin 3 MG Oral Cap       Multiple Vitamins-Minerals (BIOTIN PLUS/CALCIUM/VIT D3) Oral Tab Take 1 tablet by mouth. Emollient (COLLAGEN EX) Take by mouth daily. Collagen powder 2 tbsp       Probiotic Product (PROBIOTIC DAILY OR) Take 1 tablet by mouth daily.         Past Medical History:   Diagnosis Date    Anemia     Anxiety state, unspecified     Arthritis     Esophageal reflux     H/O cystoscopy 1995, 2006    H/O mammogram 2/2000    H/O sigmoidoscopy 05/2000    Lipid screening 10/26/2013    per NG    Mitral valve disorder     per NG: slighty thickened mitral valve in early  as per pt. - No treatment    Other ill-defined conditions(799.89)     per NG: \"tonsils ? \"; \"hepatitis ? \"    Panic attacks     Pneumonia     Pregnancy     per NG: \"child birth, [birth year]15,[birth year]\"    Rheumatic fever     Ruptured ectopic pregnancy     L salpingectomy     (spontaneous vaginal delivery)     x3      Past Surgical History:   Procedure Laterality Date    COLONOSCOPY      TUBAL LIGATION      postpartum      Family History   Problem Relation Age of Onset    Other (renal failure) Mother         renal failure    Prostate Cancer Father     Diabetes Father     Lipids Sister         hyperlipidemia    Diabetes Sister     Pancreatic Cancer Brother     Cancer Brother         pancreatic    Ovarian Cancer Maternal Cousin Female 28    Ovarian Cancer Paternal Cousin Female         42's    Diabetes Other         family h/o      Social History    Socioeconomic History      Marital status:     Tobacco Use      Smoking status: Former        Types: Cigarettes        Quit date:         Years since quittin.6      Smokeless tobacco: Never    Vaping Use      Vaping Use: Never used    Substance and Sexual Activity      Alcohol use: Yes        Alcohol/week: 1.0 standard drink of alcohol        Types: 1 Shots of liquor per week        Comment: occasionally      Drug use: No      Sexual activity: Yes    Other Topics      Concerns:        Caffeine Concern: Yes          tea, 1 cup          REVIEW OF SYSTEMS:   Denies nausea, fever, chills  No calf pain  No other muscle or joint aches  Denies chest pain or shortness of breath. EXAM:   There were no vitals taken for this visit. Constitutional:   Patient in no apparent distress. Well kept Of normal body habitus. Alert and oriented to person, place, and time. Integumentary examination:   There are no varicosities. Skin appears moist, warm, and supple with positive hair growth. There are no color changes. No open lesions.  No macerations, No Hyperkeratotic lesions. Nails are of normal thickness and appearance. Vascular examination:   DP pulse is 2/4  PT pulse is 2/4  Capillary refill is immediate  Edema is not present bilateral.  Temperature warm proximally to warm distally bilateral.  Neurological examination:   Vibratory (128-Hz tuning fork) sensation is present to right and is present to left. Sharp/dull is present to right and is present to left. Musculoskeletal examination:  Muscle Strength is 5/5. Right HAV deformity with POP to right 1st MPJ    Severely diminished right hallux DF at the MPJ       LABS & IMAGING:     Lab Results   Component Value Date    GLU 94 01/27/2023    BUN 16 01/27/2023    CREATSERUM 0.85 01/27/2023    BUNCREA 18.8 01/27/2023    ANIONGAP 5 01/27/2023    GFRAA 78 02/25/2022    GFRNAA 68 02/25/2022    CA 9.5 01/27/2023     01/27/2023    K 4.0 01/27/2023     01/27/2023    CO2 28.0 01/27/2023    OSMOCALC 295 01/27/2023        Lab Results   Component Value Date     01/27/2023    A1C 5.6 01/27/2023        No results found. ASSESSMENT AND PLAN:   Diagnoses and all orders for this visit:    Hav (hallux abducto valgus), right    Arthritis of first metatarsophalangeal (MTP) joint of right foot    Right foot pain        Plan:     Discussed the etiology of this condition as well as both conservative and surgical treatment options. Discussed conservative measures to include wide shoes, extra depth shoes, padding, offloading, orthotics, anti-inflammatory medication, and/or steroid injections. Dispensed right bunion sleeve, pt to wear when ambulating  Pt refuses orthotics  Pt would like to hold off on steroid injection at this time  Did briefly discuss surgical treatment options including possible 1st MPJ fusion, will hold off at this time until we have xrays. Ordered right foot full WB xrays    RTC 3-4 weeks for xray review.   Will also discuss if patient would like to proceed with conservative or surgical treatment options. No follow-ups on file.     Shannan Fontaine DPM  8/8/2023

## 2023-08-09 ENCOUNTER — APPOINTMENT (OUTPATIENT)
Dept: GENERAL RADIOLOGY | Age: 73
End: 2023-08-09
Attending: EMERGENCY MEDICINE
Payer: MEDICARE

## 2023-08-09 ENCOUNTER — HOSPITAL ENCOUNTER (OUTPATIENT)
Age: 73
Discharge: HOME | End: 2023-08-09
Attending: EMERGENCY MEDICINE
Payer: MEDICARE

## 2023-08-09 VITALS
OXYGEN SATURATION: 100 % | TEMPERATURE: 97 F | RESPIRATION RATE: 16 BRPM | HEART RATE: 60 BPM | SYSTOLIC BLOOD PRESSURE: 113 MMHG | DIASTOLIC BLOOD PRESSURE: 60 MMHG

## 2023-08-09 DIAGNOSIS — S60.052A CONTUSION OF LEFT LITTLE FINGER WITHOUT DAMAGE TO NAIL, INITIAL ENCOUNTER: Primary | ICD-10-CM

## 2023-08-09 PROCEDURE — 99213 OFFICE O/P EST LOW 20 MIN: CPT

## 2023-08-09 PROCEDURE — 73140 X-RAY EXAM OF FINGER(S): CPT | Performed by: EMERGENCY MEDICINE

## 2023-08-09 NOTE — ED INITIAL ASSESSMENT (HPI)
Patient arrives ambulatory with c/o smashing her left 5th finger between a door and a wall yesterday. Patient reports that her finger is swollen and bruising.

## 2023-09-05 ENCOUNTER — OFFICE VISIT (OUTPATIENT)
Dept: PODIATRY CLINIC | Facility: CLINIC | Age: 73
End: 2023-09-05

## 2023-09-05 DIAGNOSIS — M19.071 ARTHRITIS OF FIRST METATARSOPHALANGEAL (MTP) JOINT OF RIGHT FOOT: Primary | ICD-10-CM

## 2023-09-05 DIAGNOSIS — M79.671 RIGHT FOOT PAIN: ICD-10-CM

## 2023-09-05 DIAGNOSIS — M20.11 HAV (HALLUX ABDUCTO VALGUS), RIGHT: ICD-10-CM

## 2023-09-05 PROCEDURE — 1159F MED LIST DOCD IN RCRD: CPT | Performed by: PODIATRIST

## 2023-09-05 PROCEDURE — 1126F AMNT PAIN NOTED NONE PRSNT: CPT | Performed by: PODIATRIST

## 2023-09-05 PROCEDURE — 99214 OFFICE O/P EST MOD 30 MIN: CPT | Performed by: PODIATRIST

## 2023-09-05 NOTE — PROGRESS NOTES
[facility] Podiatry  Progress Note    Aleksandr Rodriguez is a 75year old female. Patient presents with: Foot Pain: Right f/u - has x-ray in the system - the R big toe is better but she still has pain in her heel rated as 3-7/10 on and off         HPI:     This is a pleasant female with PMH of b/l bunion surgery/cheilectomy in 1996. In 2004 she had a second bunion procedure performed on the right foot. She presents to clinic today due to right bunion pain f/u. She states it is painful when she walks a lot. She does wear wider shoes which helps. She has tried orthotics in the past which caused increased pain. She is here to go over xray results and possibly discuss surgery. Allergies: Dust Mites and Amoxicillin   Current Outpatient Medications   Medication Sig Dispense Refill    ALPRAZolam 0.25 MG Oral Tab Take 1 tablet (0.25 mg total) by mouth nightly as needed for Sleep. 90 tablet 1    diclofenac 1 % External Gel Use topically 4 times daily. 50 g 11    Calcium Carbonate (CALCIUM 500 OR) Take by mouth. Liquid      estradiol 0.1 MG/GM Vaginal Cream Apply intravaginally 1 g twice a week as needed 1 each 0    Vitamin D3, Cholecalciferol, 50 MCG (2000 UT) Oral Cap       Alum Hydroxide-Mag Carbonate (GAVISCON OR)       LOW-DOSE ASPIRIN OR       Melatonin 3 MG Oral Cap       Multiple Vitamins-Minerals (BIOTIN PLUS/CALCIUM/VIT D3) Oral Tab Take 1 tablet by mouth. Emollient (COLLAGEN EX) Take by mouth daily. Collagen powder 2 tbsp       Probiotic Product (PROBIOTIC DAILY OR) Take 1 tablet by mouth daily. Past Medical History:   Diagnosis Date    Anemia     Anxiety state, unspecified     Arthritis     Esophageal reflux     H/O cystoscopy 1995, 2006    H/O mammogram 2/2000    H/O sigmoidoscopy 05/2000    Lipid screening 10/26/2013    per NG    Mitral valve disorder     per NG: slighty thickened mitral valve in early 90's as per pt.  - No treatment    Other ill-defined conditions(799.89) per NG: \"tonsils ? \"; \"hepatitis ? \"    Panic attacks     Pneumonia     Pregnancy     per NG: \"child birth, 67,79,06\"    Rheumatic fever     Ruptured ectopic pregnancy     L salpingectomy     (spontaneous vaginal delivery)     x3      Past Surgical History:   Procedure Laterality Date    COLONOSCOPY      TUBAL LIGATION      postpartum      Family History   Problem Relation Age of Onset    Other (renal failure) Mother         renal failure    Prostate Cancer Father     Diabetes Father     Lipids Sister         hyperlipidemia    Diabetes Sister     Pancreatic Cancer Brother     Cancer Brother         pancreatic    Ovarian Cancer Maternal Cousin Female 28    Ovarian Cancer Paternal Cousin Female         42's    Diabetes Other         family h/o      Social History    Socioeconomic History      Marital status:     Tobacco Use      Smoking status: Former        Types: Cigarettes        Quit date:         Years since quittin.7      Smokeless tobacco: Never    Vaping Use      Vaping Use: Never used    Substance and Sexual Activity      Alcohol use: Yes        Alcohol/week: 1.0 standard drink of alcohol        Types: 1 Shots of liquor per week        Comment: occasionally      Drug use: No      Sexual activity: Yes    Other Topics      Concerns:        Caffeine Concern: Yes          tea, 1 cup          REVIEW OF SYSTEMS:   Denies nausea, fever, chills  No calf pain  No other muscle or joint aches  Denies chest pain or shortness of breath. EXAM:   There were no vitals taken for this visit. Constitutional:   Patient in no apparent distress. Well kept Of normal body habitus. Alert and oriented to person, place, and time. Integumentary examination:   There are no varicosities. Skin appears moist, warm, and supple with positive hair growth. There are no color changes. No open lesions. No macerations, No Hyperkeratotic lesions. Nails are of normal thickness and appearance.   Vascular examination:   DP pulse is 2/4  PT pulse is 2/4  Capillary refill is immediate  Edema is not present bilateral.  Temperature warm proximally to warm distally bilateral.  Neurological examination:   Vibratory (128-Hz tuning fork) sensation is present to right and is present to left. Sharp/dull is present to right and is present to left. Musculoskeletal examination:  Muscle Strength is 5/5. Right mild HAV deformity with POP to right 1st MPJ    Severely diminished right hallux DF at the MPJ       LABS & IMAGING:     Lab Results   Component Value Date    GLU 94 01/27/2023    BUN 16 01/27/2023    CREATSERUM 0.85 01/27/2023    BUNCREA 18.8 01/27/2023    ANIONGAP 5 01/27/2023    GFRAA 78 02/25/2022    GFRNAA 68 02/25/2022    CA 9.5 01/27/2023     01/27/2023    K 4.0 01/27/2023     01/27/2023    CO2 28.0 01/27/2023    OSMOCALC 295 01/27/2023        Lab Results   Component Value Date     01/27/2023    A1C 5.6 01/27/2023        No results found. ASSESSMENT AND PLAN:   Diagnoses and all orders for this visit:    Arthritis of first metatarsophalangeal (MTP) joint of right foot    Hav (hallux abducto valgus), right    Right foot pain          Plan:     Discussed the etiology of this condition as well as both conservative and surgical treatment options. Discussed conservative measures to include wide shoes, extra depth shoes, padding, offloading, orthotics, anti-inflammatory medication, and/or steroid injections. Xray Right foot full WB: 8/8/23: Bilateral osteoarthritis at the 1st metatarsophalangeal joints more advanced on the right. Pt refuses orthotics  Pt would like to hold off on steroid injection at this time  Did discuss surgical treatment options including Right foot 1st MPJ fusion. Pt would like to hold off at this time. RTC PRN for possible surgical consultation vs possible steroid injection. No follow-ups on file.     Shahla Sanchez DPM  9/5/23

## 2023-09-20 ENCOUNTER — PATIENT MESSAGE (OUTPATIENT)
Dept: INTERNAL MEDICINE CLINIC | Facility: CLINIC | Age: 73
End: 2023-09-20

## 2023-09-21 ENCOUNTER — OFFICE VISIT (OUTPATIENT)
Facility: CLINIC | Age: 73
End: 2023-09-21

## 2023-09-21 VITALS
DIASTOLIC BLOOD PRESSURE: 75 MMHG | BODY MASS INDEX: 20.2 KG/M2 | HEIGHT: 61 IN | HEART RATE: 53 BPM | SYSTOLIC BLOOD PRESSURE: 145 MMHG | WEIGHT: 107 LBS

## 2023-09-21 DIAGNOSIS — K21.9 GASTROESOPHAGEAL REFLUX DISEASE WITHOUT ESOPHAGITIS: Primary | ICD-10-CM

## 2023-09-21 PROCEDURE — 3077F SYST BP >= 140 MM HG: CPT | Performed by: PHYSICIAN ASSISTANT

## 2023-09-21 PROCEDURE — 1160F RVW MEDS BY RX/DR IN RCRD: CPT | Performed by: PHYSICIAN ASSISTANT

## 2023-09-21 PROCEDURE — 1159F MED LIST DOCD IN RCRD: CPT | Performed by: PHYSICIAN ASSISTANT

## 2023-09-21 PROCEDURE — 3078F DIAST BP <80 MM HG: CPT | Performed by: PHYSICIAN ASSISTANT

## 2023-09-21 PROCEDURE — 1126F AMNT PAIN NOTED NONE PRSNT: CPT | Performed by: PHYSICIAN ASSISTANT

## 2023-09-21 PROCEDURE — 99214 OFFICE O/P EST MOD 30 MIN: CPT | Performed by: PHYSICIAN ASSISTANT

## 2023-09-21 PROCEDURE — 3008F BODY MASS INDEX DOCD: CPT | Performed by: PHYSICIAN ASSISTANT

## 2023-09-21 RX ORDER — FAMOTIDINE 20 MG/1
20 TABLET, FILM COATED ORAL 2 TIMES DAILY
Qty: 60 TABLET | Refills: 2 | Status: SHIPPED | OUTPATIENT
Start: 2023-09-21 | End: 2023-12-20

## 2023-09-21 NOTE — TELEPHONE ENCOUNTER
Dr Rahat Holbrook =see patient's message and request for the prescription , would you prefer an appointment first ?       CARE GAPS;  Mammogram  Due soon on 11/3/2023 (Yearly)         Mammogram 11/3/2022;  Normal mammogram , repeat in one year. Written by MOLLY Noel on 11/3/2022 12:19 PM CDT  Seen by patient Madina Robledo on 11/8/2022  6:48 AM      [facility] mammogram informations; Recommends that women who are 48to 76years old and are at average risk for breast cancer get a mammogram every two years. Women who are 36to 52years old should talk to their doctor or other health care provider about when to start and how often to get a mammogram. Women should weigh the benefits and risks of screening tests when deciding whether to begin getting mammograms before age 48. LAST OFFICE VISIT with DR Rahat Holbrook 8/3/23; Instructions    Return in about 2 months (around 10/3/2023) for with PCP. Future Appointments   Date Time Provider Dallas Roman   10/20/2023  2:00 PM Parisa Marshall MD [facility] [address]   10[facility]24/2023  [facility] [place]   6/14/2024 11:30 AM Rowena Bates [facility]           From: Madina Robledo  To: Tyrone Bashir  Sent: 9/20/2023  1:04 PM CDT  Subject: Hydroxyzine for sleep and mammogram    Dr. Elba Willett,   Is it possible to get a prescription for hydroxyzine for sleep? My Xanax works fine for sleep but I've been hearing about this medication from others who use it for sleep and I thought I'd like to try it. So, maybe a trial prescription for 10 to 30 days? Whatever you think would be appropriate. My only concern would be overly dry mouth with it, which is a problem for me. But I'd still like to check it out. Also, is it necessary at my age, 75, to have a yearly mammogram since my mammograms have been normal? I had one last November, 2022. Thank you!   Christen Chang

## 2023-09-21 NOTE — PROGRESS NOTES
[address] [facility] [address] Gastroenterology                                                                                                               Reason for consult: Patient presents with:  Gastro-esophageal Reflux: Acid reflux flare up. Requesting physician or provider: Natanael Law MD      HPI:   Tessa Barlow is a 75year old year-old female with history of HLD, GERD, LPRD, OA who presents for worsening GERD. She presents to office today with a flare of her acid reflux. She notes when she has had a flare in the past, it improves with a low protein and low carb diet. She notes PPI often do not work for her, she will get many side effects. This flare up, she has not had improvement with diet. She notes increased hoarseness in her voice and burning sensation in the middle of her chest. she denies dysphagia, odynophagia and/or globus. she denies abdominal pain. she denies nausea and/or vomiting. she denies recent change in appetite and/or unintentional weight loss. She notes on 8/11 she started topical diclofenac. Flare started about 10 days after started diclofenac. Still eating more than 3 hours prior to bed. She avoids classic GERD triggers. She often uses DGL, melinda. Did take a Pepcid yesterday had relief. Patient remains constipated. She is taking vital fiber which does assist with daily BM. No brbpr or melena. Pertinent Surgical Hx:  Tubal ligation  - See additional surgical hx below  - No known issues with sedation.  - No known history of sleep apnea. Pertinent Family Hx:  + Pancreatic cancer, brother  - No family hx of esophageal, gastric or colon cancer  - No family history of IBD.      Prior endoscopies:  October 2016 EGD performed by Dr. Jerry Hart with IV twilight related to chronic GERD, findings: Mild gastritis, distal esophagitis grade 1, small hiatal hernia, biopsies reveal mild chronic reflux esophagitis/gastritis, H. pylori negative     2015 colonoscopy performed by Dr. Jerry Hart with IV twilight related to colon cancer screening, findings: Diverticulosis predominantly left-sided, uncomplicated, internal hemorrhoids, 10-year recall     Social Hx:  + Former smoker  + social ETOh  - Denies illicit drug use   - LMP: Postmenopausal  - Lives with spouse  - NSAIDs/ASA use: PRN      Wt Readings from Last 6 Encounters:  23 : 107 lb (48.5 kg)  23 : 110 lb (49.9 kg)  23 : 110 lb (49.9 kg)  23 : 110 lb (49.9 kg)  23 : 110 lb (49.9 kg)  22 : 108 lb (49 kg)       History, Medications, Allergies, ROS:      Past Medical History:   Diagnosis Date    Anemia     Anxiety state, unspecified     Arthritis     Esophageal reflux     H/O cystoscopy ,     H/O mammogram 2000    H/O sigmoidoscopy 2000    Lipid screening 10/26/2013    per NG    Mitral valve disorder     per NG: slighty thickened mitral valve in early  as per pt. - No treatment    Other ill-defined conditions(799.89)     per NG: \"tonsils ? \"; \"hepatitis ? \"    Panic attacks     Pneumonia     Pregnancy     per NG: \"child birth, 62,18,06\"    Rheumatic fever     Ruptured ectopic pregnancy     L salpingectomy     (spontaneous vaginal delivery)     x3      Past Surgical History:   Procedure Laterality Date    COLONOSCOPY      TUBAL LIGATION      postpartum      Family Hx:   Family History   Problem Relation Age of Onset    Other (renal failure) Mother         renal failure    Prostate Cancer Father     Diabetes Father     Lipids Sister         hyperlipidemia    Diabetes Sister     Pancreatic Cancer Brother     Cancer Brother         pancreatic    Ovarian Cancer Maternal Cousin Female 28    Ovarian Cancer Paternal Cousin Female         42's    Diabetes Other         family h/o      Social History:   Social History     Socioeconomic History    Marital status:    Tobacco Use    Smoking status: Former     Types: Cigarettes Quit date: 5     Years since quittin.7    Smokeless tobacco: Never   Vaping Use    Vaping Use: Never used   Substance and Sexual Activity    Alcohol use: Yes     Alcohol/week: 1.0 standard drink of alcohol     Types: 1 Shots of liquor per week     Comment: occasionally    Drug use: No    Sexual activity: Yes   Other Topics Concern    Caffeine Concern Yes     Comment: tea, 1 cup        Medications (Active prior to today's visit):  Current Outpatient Medications   Medication Sig Dispense Refill    famotidine 20 MG Oral Tab Take 1 tablet (20 mg total) by mouth 2 (two) times daily. 60 tablet 2    ALPRAZolam 0.25 MG Oral Tab Take 1 tablet (0.25 mg total) by mouth nightly as needed for Sleep. 90 tablet 1    diclofenac 1 % External Gel Use topically 4 times daily. 50 g 11    Calcium Carbonate (CALCIUM 500 OR) Take by mouth. Liquid      estradiol 0.1 MG/GM Vaginal Cream Apply intravaginally 1 g twice a week as needed 1 each 0    Vitamin D3, Cholecalciferol, 50 MCG (2000 UT) Oral Cap       Alum Hydroxide-Mag Carbonate (GAVISCON OR)       LOW-DOSE ASPIRIN OR       Melatonin 3 MG Oral Cap       Multiple Vitamins-Minerals (BIOTIN PLUS/CALCIUM/VIT D3) Oral Tab Take 1 tablet by mouth. Emollient (COLLAGEN EX) Take by mouth daily. Collagen powder 2 tbsp       Probiotic Product (PROBIOTIC DAILY OR) Take 1 tablet by mouth daily.          Allergies:    Dust Mites              Coughing, Runny nose, Tightness in                            Throat  Amoxicillin             RASH    ROS:   CONSTITUTIONAL: negative for fevers, chills, sweats and weight loss  EYES Negative for red eyes, yellow eyes, changes in vision  HEENT: Negative for dysphagia and hoarseness  RESPIRATORY: Negative for cough and shortness of breath  CARDIOVASCULAR: Negative for chest pain, palpitations  GASTROINTESTINAL: See HPI  GENITOURINARY: Negative for dysuria and frequency  MUSCULOSKELETAL: Negative for arthralgias and myalgias  NEUROLOGICAL: Negative for dizziness and headaches  BEHAVIOR/PSYCH: Negative for anxiety and poor appetite    PHYSICAL EXAM:   Blood pressure 145/75, pulse 53, height 5' 1\" (1.549 m), weight 107 lb (48.5 kg), not currently breastfeeding. GEN: WD/WN, NAD  HEENT: Supple symmetrical, trachea midline  CV: RRR, the extremities are warm and well perfused   LUNGS: No increased work of breathing  ABDOMEN: No scars, normal bowel sounds, soft, non-tender, non-distended no rebound or guarding, no masses, no hepatomegaly  MSK: No redness, no warmth, no swelling of joints  SKIN: No jaundice, no erythema, no rashes  HEMATOLOGIC: No bleeding, no bruising  NEURO: Alert and interactive, normal gait    Labs/Imaging/Procedures:     Patient's pertinent labs and imaging were reviewed and discussed with patient today. Lab Results   Component Value Date    WBC 6.6 01/27/2023    RBC 4.08 01/27/2023    HGB 13.3 01/27/2023    HCT 40.4 01/27/2023    MCV 99.0 01/27/2023    MCH 32.6 01/27/2023    MCHC 32.9 01/27/2023    RDW 12.3 01/27/2023    .0 01/27/2023    MPV 9.5 01/10/2019        Lab Results   Component Value Date    GLU 94 01/27/2023    BUN 16 01/27/2023    BUNCREA 18.8 01/27/2023    CREATSERUM 0.85 01/27/2023    ANIONGAP 5 01/27/2023    GFR >60 11/30/2015    GFRNAA 68 02/25/2022    GFRAA 78 02/25/2022    CA 9.5 01/27/2023    OSMOCALC 295 01/27/2023    ALKPHO 76 01/27/2023    AST 25 01/27/2023    ALT 29 01/27/2023    ALKPHOS 76 02/29/2016    BILT 0.9 01/27/2023    TP 7.2 01/27/2023    ALB 3.5 01/27/2023    GLOBULIN 3.7 01/27/2023    AGRATIO 1.3 02/29/2016     01/27/2023    K 4.0 01/27/2023     01/27/2023    CO2 28.0 01/27/2023        No results found. .  ASSESSMENT/PLAN:   Alistair Naranjo is a 75year old year-old female with history of HLD, GERD, LPRD, OA who presents for worsening GERD. #GERD  History of GERD. Often well controlled with diet changes and use of herbal medications.  Patient notes usually treatment has not seemed to assist with her symptoms. Continues to having middle of the chest burning sensation and hoarse voice. No dysphagia, globus or odynophagia. Denies unintentional weight loss and decreased appetite. Denies melena. Has chronic constipation, but no change. Did get mild improvement with one day of pepcid. Advised for daily pepcid use for 2 weeks. Follow up in clinic with worsening symptoms. Recommendations:  - start famotidine 20 mg twice  a day for 14 days   - then once a day for next 14 days  - can start IB ronnie to help with symptoms  - can use micky seltzer as needed for worsening symptoms  - follow up in office if any worsening of symptoms       Orders This Visit:  No orders of the defined types were placed in this encounter. Meds This Visit:  Requested Prescriptions     Signed Prescriptions Disp Refills    famotidine 20 MG Oral Tab 60 tablet 2     Sig: Take 1 tablet (20 mg total) by mouth 2 (two) times daily. Imaging & Referrals:  None    Arianna Hernadez PA-C   9/21/2023        This note was partially prepared using [employer] [facility] voice recognition dictation software. As a result, errors may occur. When identified, these errors have been corrected.  While every attempt is made to correct errors during dictation, discrepancies may still exist.

## 2023-09-21 NOTE — PATIENT INSTRUCTIONS
Recommendations:  - start famotidine 20 mg twice  a day for 14 days   - then once a day for next 14 days  - can start IB ronnie to help with symptoms  - can use micky seltzer as needed for worsening symptoms  - follow up in office if any worsening of symptoms

## 2023-09-21 NOTE — TELEPHONE ENCOUNTER
Spoke to patient, she wants to try hydroxyzine to help insomnia, states that her daughter is doing well on this medication, I did advise that medication can cause dry mouth, has to be careful not to fall at night if she gets up to the bathroom. Sent prescription for 30 tablets to use as needed if it is working.   An order for mammogram placed she is due in November

## 2023-09-22 ENCOUNTER — PATIENT MESSAGE (OUTPATIENT)
Facility: CLINIC | Age: 73
End: 2023-09-22

## 2023-09-22 NOTE — TELEPHONE ENCOUNTER
Edmund Flores    Just to clarify  Patient has BID pepcid. Do you want her to take it before dinner or at bedtime? Also, since she is on a fiber supplement should she take the pepcid a set amount of time before the fiber so it gets fully absorbed?     Thank you

## 2023-09-22 NOTE — TELEPHONE ENCOUNTER
Famotidine 30 mins prior to breakfast and dinner. Fiber should be at least 30 mins after famotidine. Probiotic in the pm.     Thanks!

## 2023-09-29 ENCOUNTER — PATIENT MESSAGE (OUTPATIENT)
Facility: CLINIC | Age: 73
End: 2023-09-29

## 2023-09-29 NOTE — TELEPHONE ENCOUNTER
From: Tyler Day  [facility] [place] [place]  Sent: 9/29/2023 11:19 AM CDT  Subject: Famotidine use    Hi, just to update. I've been on twice daily 20 mg Famotidine before breakfast and dinner. So far a slight improvement in the acid tastes in my mouth and throat have daily, mostly at night. Two questions: When I go down to one Famotidine in a week, should I take it before breakfast or before dinner? Also, I have a prescription for 25 mg, Hydroxyzine to help with sleep. Should I start it or wait until I am finished with the month-long Famotidine regimen? Want to be sure there are no interactions I should be aware of. Thank you!   Farhan Chand

## 2023-10-23 ENCOUNTER — PATIENT MESSAGE (OUTPATIENT)
Facility: CLINIC | Age: 73
End: 2023-10-23

## 2023-10-23 NOTE — TELEPHONE ENCOUNTER
Please schedule EGD. Reason: GERD  Medications: can continue  Sedation: MAC  Prep: nothing by mouth midnight day of procedure.

## 2023-10-23 NOTE — TELEPHONE ENCOUNTER
From: Shannon Almaraz  To: Shraan Miguel  Sent: 10/23/2023 12:32 PM CDT  Subject: Update to 9/21 visit    Enedina Leary,   At my last visit, you prescribed famotidine 20 mgs twice a day for 2 weeks and once a day for 2 weeks. After 15 days on the medication I had to stop. My acid reflux was getting worse with bitterness and acid tastes in my mouth and throat day and night. This medication and PPIs have not worked for me in the past. Once stopping the situation got better, but I am still trying to manage it with diet, gum chewing, honey, melinda, antacids etc.   I would like to get an endoscopy for my own peace of mind, to see if there is any damage to my esophagus or changes since the one I had in 2016. I am also working on getting my chronic constipation under control since it adds to the reflux problem, as I was told by the doctors at [facility]. I will try using Miralax again and see if that works. Please let me know when I might get in for an endoscopy at [facility]. Or any other thoughts you might have.  Thank you!!

## 2023-10-24 ENCOUNTER — OFFICE VISIT (OUTPATIENT)
Dept: OBGYN CLINIC | Facility: CLINIC | Age: 73
End: 2023-10-24

## 2023-10-24 ENCOUNTER — TELEPHONE (OUTPATIENT)
Dept: GASTROENTEROLOGY | Facility: CLINIC | Age: 73
End: 2023-10-24

## 2023-10-24 VITALS
SYSTOLIC BLOOD PRESSURE: 135 MMHG | DIASTOLIC BLOOD PRESSURE: 77 MMHG | BODY MASS INDEX: 20 KG/M2 | HEART RATE: 70 BPM | WEIGHT: 107.38 LBS

## 2023-10-24 DIAGNOSIS — K21.9 GASTROESOPHAGEAL REFLUX DISEASE, UNSPECIFIED WHETHER ESOPHAGITIS PRESENT: Primary | ICD-10-CM

## 2023-10-24 DIAGNOSIS — N95.2 ATROPHIC VAGINITIS: ICD-10-CM

## 2023-10-24 DIAGNOSIS — Z01.411 ENCOUNTER FOR GYNECOLOGICAL EXAMINATION WITH ABNORMAL FINDING: Primary | ICD-10-CM

## 2023-10-24 PROCEDURE — 1159F MED LIST DOCD IN RCRD: CPT | Performed by: OBSTETRICS & GYNECOLOGY

## 2023-10-24 PROCEDURE — 3078F DIAST BP <80 MM HG: CPT | Performed by: OBSTETRICS & GYNECOLOGY

## 2023-10-24 PROCEDURE — 3075F SYST BP GE 130 - 139MM HG: CPT | Performed by: OBSTETRICS & GYNECOLOGY

## 2023-10-24 PROCEDURE — G0101 CA SCREEN;PELVIC/BREAST EXAM: HCPCS | Performed by: OBSTETRICS & GYNECOLOGY

## 2023-10-24 RX ORDER — ESTRADIOL 0.1 MG/G
CREAM VAGINAL
Qty: 42 G | Refills: 2 | Status: SHIPPED | OUTPATIENT
Start: 2023-10-24

## 2023-10-24 NOTE — TELEPHONE ENCOUNTER
Scheduled for:  EGD 36515  Provider Name:    Date:  Thursday, 11/02/2023  Location:  [facility]  Sedation:  Mac  Time:  945am (pt is aware to arrive at 845am    Prep:  Npo  Meds/Allergies Reconciled?:  Yes    Diagnosis with codes:  GERD K21.9  Was patient informed to call insurance with codes (Y/N): yes      Referral sent?:  Referral was sent at the time of electronic surgical scheduling. [address] [facility] or [address] notified?:  I sent an electronic request to Endo Scheduling and received a confirmation today. Medication Orders:  none  Misc Orders:  none     Further instructions given by staff:  I discussed the prep instructions with the patient which she verbally understood and is aware that I will send the instructions today. via [employer]

## 2023-10-25 NOTE — PROGRESS NOTES
Subjective:   Patient ID: Maura Kelley is a 75year old female. HPI   and . She is not in sexual relation- estranged. No new family or personal medical issues. She has chronic GERD and constipation. She also saw Dr Maxi Sorto for urinary urgency / nocturia. She uses Estrace cream.     History/Other:   Review of Systems   Constitutional:  Negative for appetite change, fatigue and unexpected weight change. Eyes:  Negative for visual disturbance. Respiratory:  Negative for cough and shortness of breath. Cardiovascular:  Negative for chest pain, palpitations and leg swelling. Gastrointestinal:  Positive for constipation (chronic). Negative for abdominal distention, abdominal pain, blood in stool and diarrhea. Genitourinary:  Positive for pelvic pain (Recent intermittent \"twinges\" on LLQ lasting seconds). Negative for dysuria, frequency and urgency. Musculoskeletal:  Negative for arthralgias and myalgias. Skin:  Negative for rash. Neurological:  Negative for weakness, numbness and headaches. Psychiatric/Behavioral:  Negative for dysphoric mood. The patient is not nervous/anxious. Current Outpatient Medications   Medication Sig Dispense Refill    estradiol 0.1 MG/GM Vaginal Cream Apply intravaginally 1 g twice a week as needed 42 g 2    hydrOXYzine 25 MG Oral Tab Take 1 tab p.o. at bedtime as needed 30 tablet 0    ALPRAZolam 0.25 MG Oral Tab Take 1 tablet (0.25 mg total) by mouth nightly as needed for Sleep. 90 tablet 1    diclofenac 1 % External Gel Use topically 4 times daily. 50 g 11    Calcium Carbonate (CALCIUM 500 OR) Take by mouth. Liquid      Vitamin D3, Cholecalciferol, 50 MCG (2000 UT) Oral Cap       Alum Hydroxide-Mag Carbonate (GAVISCON OR)       LOW-DOSE ASPIRIN OR       Melatonin 3 MG Oral Cap       Emollient (COLLAGEN EX) Take by mouth daily. Collagen powder 2 tbsp       Probiotic Product (PROBIOTIC DAILY OR) Take 1 tablet by mouth daily.       famotidine 20 MG Oral Tab Take 1 tablet (20 mg total) by mouth 2 (two) times daily. (Patient not taking: Reported on 10/24/2023) 60 tablet 2    Multiple Vitamins-Minerals (BIOTIN PLUS/CALCIUM/VIT D3) Oral Tab Take 1 tablet by mouth. (Patient not taking: Reported on 10/24/2023)       Allergies:  Dust Mites              Coughing, Runny nose, Tightness in                            Throat  Amoxicillin             RASH    Objective:   Physical Exam  Constitutional:       General: She is not in acute distress. Appearance: She is well-developed. She is not diaphoretic. Neck:      Thyroid: No thyromegaly. Cardiovascular:      Rate and Rhythm: Normal rate and regular rhythm. Heart sounds: Normal heart sounds. No murmur heard. Pulmonary:      Effort: Pulmonary effort is normal.      Breath sounds: Normal breath sounds. No wheezing or rales. Chest:   Breasts:     Right: Normal. No mass, nipple discharge, skin change or tenderness. Left: Normal. No mass, nipple discharge, skin change or tenderness. Comments:     Abdominal:      General: There is no distension. Palpations: Abdomen is soft. There is no mass. Tenderness: There is no abdominal tenderness. There is no guarding or rebound. Genitourinary:     Labia:         Right: No rash or lesion. Left: No rash or lesion. Vagina: Normal. No vaginal discharge. Cervix: No cervical motion tenderness or discharge. Uterus: Not enlarged and not tender. Adnexa:         Right: No mass, tenderness or fullness. Left: No mass, tenderness or fullness. Comments: Moderate atrophy c/w age. Musculoskeletal:         General: No tenderness. Cervical back: Neck supple. Lymphadenopathy:      Cervical: No cervical adenopathy. Upper Body:      Right upper body: No supraclavicular, axillary or pectoral adenopathy. Left upper body: No supraclavicular, axillary or pectoral adenopathy.    Neurological:      Mental Status: She is alert. Assessment & Plan:   Encounter for gynecological examination with abnormal finding  (primary encounter diagnosis)  Atrophic vaginitis  She has mammo order from PCP- Dr Sharron Kovacs. No orders of the defined types were placed in this encounter.       Meds This Visit:  Requested Prescriptions     Signed Prescriptions Disp Refills    estradiol 0.1 MG/GM Vaginal Cream 42 g 2     Sig: Apply intravaginally 1 g twice a week as needed       Imaging & Referrals:  None

## 2023-11-02 ENCOUNTER — HOSPITAL ENCOUNTER (OUTPATIENT)
Facility: HOSPITAL | Age: 73
Setting detail: HOSPITAL OUTPATIENT SURGERY
Discharge: HOME | End: 2023-11-02
Attending: INTERNAL MEDICINE | Admitting: INTERNAL MEDICINE
Payer: MEDICARE

## 2023-11-02 ENCOUNTER — ANESTHESIA EVENT (OUTPATIENT)
Dept: ENDOSCOPY | Facility: HOSPITAL | Age: 73
End: 2023-11-02
Payer: MEDICARE

## 2023-11-02 ENCOUNTER — ANESTHESIA (OUTPATIENT)
Dept: ENDOSCOPY | Facility: HOSPITAL | Age: 73
End: 2023-11-02
Payer: MEDICARE

## 2023-11-02 VITALS
HEART RATE: 52 BPM | SYSTOLIC BLOOD PRESSURE: 105 MMHG | BODY MASS INDEX: 20.2 KG/M2 | WEIGHT: 107 LBS | OXYGEN SATURATION: 99 % | RESPIRATION RATE: 19 BRPM | HEIGHT: 61 IN | TEMPERATURE: 98 F | DIASTOLIC BLOOD PRESSURE: 62 MMHG

## 2023-11-02 DIAGNOSIS — K21.9 GASTROESOPHAGEAL REFLUX DISEASE, UNSPECIFIED WHETHER ESOPHAGITIS PRESENT: ICD-10-CM

## 2023-11-02 PROCEDURE — 0DB68ZX EXCISION OF STOMACH, VIA NATURAL OR ARTIFICIAL OPENING ENDOSCOPIC, DIAGNOSTIC: ICD-10-PCS | Performed by: INTERNAL MEDICINE

## 2023-11-02 PROCEDURE — 43239 EGD BIOPSY SINGLE/MULTIPLE: CPT | Performed by: INTERNAL MEDICINE

## 2023-11-02 PROCEDURE — 0DB48ZX EXCISION OF ESOPHAGOGASTRIC JUNCTION, VIA NATURAL OR ARTIFICIAL OPENING ENDOSCOPIC, DIAGNOSTIC: ICD-10-PCS | Performed by: INTERNAL MEDICINE

## 2023-11-02 PROCEDURE — 0DB28ZX EXCISION OF MIDDLE ESOPHAGUS, VIA NATURAL OR ARTIFICIAL OPENING ENDOSCOPIC, DIAGNOSTIC: ICD-10-PCS | Performed by: INTERNAL MEDICINE

## 2023-11-02 RX ORDER — ONDANSETRON 2 MG/ML
4 INJECTION INTRAMUSCULAR; INTRAVENOUS ONCE AS NEEDED
OUTPATIENT
Start: 2023-11-02 | End: 2023-11-02

## 2023-11-02 RX ORDER — NALOXONE HYDROCHLORIDE 0.4 MG/ML
80 INJECTION, SOLUTION INTRAMUSCULAR; INTRAVENOUS; SUBCUTANEOUS AS NEEDED
OUTPATIENT
Start: 2023-11-02 | End: 2023-11-02

## 2023-11-02 RX ORDER — LIDOCAINE HYDROCHLORIDE 10 MG/ML
INJECTION, SOLUTION EPIDURAL; INFILTRATION; INTRACAUDAL; PERINEURAL AS NEEDED
Status: DISCONTINUED | OUTPATIENT
Start: 2023-11-02 | End: 2023-11-02 | Stop reason: SURG

## 2023-11-02 RX ORDER — CITRIC ACID/SODIUM CITRATE 334-500MG
SOLUTION, ORAL ORAL AS NEEDED
Status: DISCONTINUED | OUTPATIENT
Start: 2023-11-02 | End: 2023-11-02 | Stop reason: SURG

## 2023-11-02 RX ORDER — SODIUM CHLORIDE, SODIUM LACTATE, POTASSIUM CHLORIDE, CALCIUM CHLORIDE 600; 310; 30; 20 MG/100ML; MG/100ML; MG/100ML; MG/100ML
INJECTION, SOLUTION INTRAVENOUS CONTINUOUS
OUTPATIENT
Start: 2023-11-02

## 2023-11-02 RX ORDER — SUCRALFATE 1 G/1
1 TABLET ORAL
Qty: 90 TABLET | Refills: 2 | Status: SHIPPED | OUTPATIENT
Start: 2023-11-02 | End: 2024-01-31

## 2023-11-02 RX ORDER — SODIUM CHLORIDE, SODIUM LACTATE, POTASSIUM CHLORIDE, CALCIUM CHLORIDE 600; 310; 30; 20 MG/100ML; MG/100ML; MG/100ML; MG/100ML
INJECTION, SOLUTION INTRAVENOUS CONTINUOUS
Status: DISCONTINUED | OUTPATIENT
Start: 2023-11-02 | End: 2023-11-02

## 2023-11-02 RX ADMIN — CITRIC ACID/SODIUM CITRATE 30 ML: 334-500MG SOLUTION, ORAL ORAL at 10:03:00

## 2023-11-02 RX ADMIN — SODIUM CHLORIDE, SODIUM LACTATE, POTASSIUM CHLORIDE, CALCIUM CHLORIDE: 600; 310; 30; 20 INJECTION, SOLUTION INTRAVENOUS at 10:07:00

## 2023-11-02 RX ADMIN — LIDOCAINE HYDROCHLORIDE 50 MG: 10 INJECTION, SOLUTION EPIDURAL; INFILTRATION; INTRACAUDAL; PERINEURAL at 10:10:00

## 2023-11-02 NOTE — H&P
History & Physical Examination    Patient Name: Madina Robledo  MRN: [id number]  CSN: [id number]  YOB: 1950    Diagnosis:   GERD      hydrOXYzine 25 MG Oral Tab, Take 1 tab p.o. at bedtime as needed, Disp: 30 tablet, Rfl: 0, PRN  ALPRAZolam 0.25 MG Oral Tab, Take 1 tablet (0.25 mg total) by mouth nightly as needed for Sleep., Disp: 90 tablet, Rfl: 1, 11/1/2023 at 0000  Calcium Carbonate (CALCIUM 500 OR), Take by mouth. Liquid, Disp: , Rfl: , 11/1/2023 at 1900  Vitamin D3, Cholecalciferol, 50 MCG (2000 UT) Oral Cap, , Disp: , Rfl: , 11/1/2023 at 0900  Alum Hydroxide-Mag Carbonate (GAVISCON OR), , Disp: , Rfl: , 11/1/2023 at 0900  LOW-DOSE ASPIRIN OR, , Disp: , Rfl: , PRN  Melatonin 3 MG Oral Cap, , Disp: , Rfl: , 11/1/2023 at 1930  Multiple Vitamins-Minerals (BIOTIN PLUS/CALCIUM/VIT D3) Oral Tab, Take 1 tablet by mouth., Disp: , Rfl: , 10/31/2023 at 1000  Probiotic Product (PROBIOTIC DAILY OR), Take 1 tablet by mouth daily. , Disp: , Rfl: , 11/1/2023 at 0900  estradiol 0.1 MG/GM Vaginal Cream, Apply intravaginally 1 g twice a week as needed, Disp: 42 g, Rfl: 2, 10/29/2023 at 2200  famotidine 20 MG Oral Tab, Take 1 tablet (20 mg total) by mouth 2 (two) times daily. (Patient not taking: Reported on 10/24/2023), Disp: 60 tablet, Rfl: 2  diclofenac 1 % External Gel, Use topically 4 times daily. , Disp: 50 g, Rfl: 11, PRN  Emollient (COLLAGEN EX), Take by mouth daily.  Collagen powder 2 tbsp , Disp: , Rfl: , 11/1/2023 at 0800      lactated ringers infusion, , Intravenous, Continuous        Allergies:   Dust Mites              Coughing, Runny nose, Tightness in                            Throat  Amoxicillin             RASH    Past Medical History:   Diagnosis Date    Anemia     Anxiety state, unspecified     Arthritis     Esophageal reflux     H/O cystoscopy 1995, 2006    H/O mammogram 02/2000    H/O sigmoidoscopy 05/2000    Hepatitis     High cholesterol     Lipid screening 10/26/2013    per EUGENIO Mitral valve disorder     per NG: slighty thickened mitral valve in early  as per pt. - No treatment    Other ill-defined conditions(799.89)     per NG: \"tonsils ? \"; \"hepatitis ? \"    Panic attacks     Pneumonia     PONV (postoperative nausea and vomiting)     Pregnancy     per NG: \"child birth, 36,37,04\"    Pulmonary emphysema (HCC)     Rheumatic fever     Ruptured ectopic pregnancy     L salpingectomy     (spontaneous vaginal delivery)     x3     Past Surgical History:   Procedure Laterality Date    COLONOSCOPY      TUBAL LIGATION      postpartum     Family History   Problem Relation Age of Onset    Other (renal failure) Mother         renal failure    Prostate Cancer Father     Diabetes Father     Lipids Sister         hyperlipidemia    Diabetes Sister     Pancreatic Cancer Brother     Cancer Brother         pancreatic    Ovarian Cancer Maternal Cousin Female 28    Ovarian Cancer Paternal Cousin Female         42's    Diabetes Other         family h/o     Social History    Tobacco Use      Smoking status: Former        Types: Cigarettes        Quit date:         Years since quittin.8      Smokeless tobacco: Never    Alcohol use: Not Currently      Alcohol/week: 1.0 standard drink of alcohol      Types: 1 Shots of liquor per week      Comment: occasionally      SYSTEM Check if Review is Normal Check if Physical Exam is Normal If not normal, please explain:   HEENT [x ] [ x]    NECK & BACK [x ] [x ]    HEART [x ] [ x]    LUNGS [x ] [ x]    ABDOMEN [x ] [x ]    UROGENITAL [ ] [ ]    EXTREMITIES [x ] [x ]    OTHER        [ x ] I have discussed the risks and benefits and alternatives with the patient/family. They understand and agree to proceed with plan of care. [ x ] I have reviewed the History and Physical done within the last 30 days. Any changes noted above. Shanda Loco MD  2023  9:54 AM

## 2023-11-02 NOTE — OPERATIVE REPORT
[facility] Endoscopy Report  Date of procedure-November 2, 2023    Preoperative Diagnosis:  -Chronic GERD    Postoperative Diagnosis:  -Hiatal hernia 2 cm    Procedure:  Esophagogastroduodenoscopy       Surgeon:  Dolores Cox M.D. Anesthesia:  MAC     Technique:  After informed consent, the patient was placed in the left lateral recumbent position. An Olympus adult HD gastroscope was inserted into the hypopharynx and advanced under direct vision into the esophagus, stomach and duodenum. The endoscope was withdrawn to the stomach where retroflexion of the annulus, body, cardia and fundus was performed. The instrument was straightened, insufflated air and fluid were suctioned and the endoscope was withdrawn. The procedure was well tolerated without immediate complication. Findings: The esophagus was normal.  Biopsies taken at the GE junction, no stricture web or ring were noted. The GE junction and diaphragmatic impression were at 38 cm and 40 cm for 2 cm sliding-type hiatal hernia. The stomach distended appropriately with insufflated air. The mucosa of the stomach including cardia, fundus, gastric body and antrum were normal.  Biopsies taken from the junction of the antrum and body of the stomach. The duodenal bulb and post bulbar regions were normal.    Estimated blood loss-insignificant  Specimens-see above    Impression:  -Hiatal hernia 2 cm    Recommendations:  - Post procedure instructions given  - Antireflux dietary and behavioral changes          Juan Zuñiga.  Lindy Ayala MD  11/2/2023  10:22 AM

## 2023-11-02 NOTE — DISCHARGE INSTRUCTIONS
Home Care Instructions for Gastroscopy with Sedation    Diet:  - Resume your regular diet as tolerated unless otherwise instructed. - Start with light meals to minimize bloating.  - Do not drink alcohol today. Medication:  - If you have questions about resuming your normal medications, please contact your Primary Care Physician. Activities:  - Take it easy today. Do not return to work today. - Do not drive today. - Do not operate any machinery today (including kitchen equipment). Gastroscopy:  - You may have a sore throat for 2-3 days following the exam. This is normal. Gargling with warm salt water (1/2 tsp salt to 1 glass warm water) or using throat lozenges will help. - If you experience any sharp pain in your neck, abdomen or chest, vomiting of blood, oral temperature over 100 degrees Fahrenheit, light-headedness or dizziness, or any other problems, contact your doctor. **If unable to reach your doctor, please go to the [facility] Emergency Room**    - Your referring physician will receive a full report of your examination.  - If you do not hear from your doctor's office within two weeks of your biopsy, please call them for your results. You may be able to see your laboratory results in [facility] between 4 and 7 business days. In some cases, your physician may not have viewed the results before they are released to [address] If you have questions regarding your results contact the physician who ordered the test/exam by phone or via [address] by choosing \"Ask a Medical Question. \"

## 2023-11-06 ENCOUNTER — TELEPHONE (OUTPATIENT)
Facility: CLINIC | Age: 73
End: 2023-11-06

## 2023-11-06 NOTE — TELEPHONE ENCOUNTER
Patient contacted and results of upper endoscopy reviewed.  She has a small hiatal hernia, biopsy results discussed.  No evidence of EOE.    She is on acid suppression currently with sucralfate which she is taking 3 times a day.  She has not gotten benefit from other forms of acid suppression.  She has tried PPI and H2 blockers.  She has been seen at [place].    Patient had questions regarding her taste and plans on seeing ENT.    Discussed other possibilities including visceral hypersensitivity, discussed Elavil or other options.  She may be amenable to thinking about this, we discussed side effects at length.  She will contact myself, my office and or her primary physician Dr. Watson further questions.

## 2023-11-17 ENCOUNTER — HOSPITAL ENCOUNTER (OUTPATIENT)
Dept: MAMMOGRAPHY | Age: 73
Discharge: HOME | End: 2023-11-17
Attending: INTERNAL MEDICINE
Payer: MEDICARE

## 2023-11-17 DIAGNOSIS — Z12.31 BREAST CANCER SCREENING BY MAMMOGRAM: ICD-10-CM

## 2023-11-17 PROCEDURE — 77067 SCR MAMMO BI INCL CAD: CPT | Performed by: INTERNAL MEDICINE

## 2023-11-17 PROCEDURE — 77063 BREAST TOMOSYNTHESIS BI: CPT | Performed by: INTERNAL MEDICINE

## 2023-12-11 ENCOUNTER — NURSE TRIAGE (OUTPATIENT)
Dept: INTERNAL MEDICINE CLINIC | Facility: CLINIC | Age: 73
End: 2023-12-11

## 2023-12-11 ENCOUNTER — OFFICE VISIT (OUTPATIENT)
Dept: INTERNAL MEDICINE CLINIC | Facility: CLINIC | Age: 73
End: 2023-12-11
Payer: MEDICARE

## 2023-12-11 ENCOUNTER — LAB ENCOUNTER (OUTPATIENT)
Dept: LAB | Age: 73
End: 2023-12-11
Attending: NURSE PRACTITIONER
Payer: MEDICARE

## 2023-12-11 VITALS
BODY MASS INDEX: 20.58 KG/M2 | HEART RATE: 60 BPM | HEIGHT: 61 IN | DIASTOLIC BLOOD PRESSURE: 73 MMHG | SYSTOLIC BLOOD PRESSURE: 134 MMHG | WEIGHT: 109 LBS

## 2023-12-11 DIAGNOSIS — M25.521 RIGHT ELBOW PAIN: Primary | ICD-10-CM

## 2023-12-11 DIAGNOSIS — M15.9 OSTEOARTHRITIS OF MULTIPLE JOINTS, UNSPECIFIED OSTEOARTHRITIS TYPE: ICD-10-CM

## 2023-12-11 DIAGNOSIS — M25.521 RIGHT ELBOW PAIN: ICD-10-CM

## 2023-12-11 PROBLEM — M35.00 SICCA SYNDROME (HCC): Status: ACTIVE | Noted: 2023-12-11

## 2023-12-11 PROBLEM — E46 PROTEIN-CALORIE MALNUTRITION, UNSPECIFIED SEVERITY (HCC): Status: ACTIVE | Noted: 2023-12-11

## 2023-12-11 LAB
BASOPHILS # BLD AUTO: 0.07 X10(3) UL (ref 0–0.2)
BASOPHILS NFR BLD AUTO: 1 %
CRP SERPL-MCNC: <0.4 MG/DL (ref ?–1)
DEPRECATED RDW RBC AUTO: 42.8 FL (ref 35.1–46.3)
EOSINOPHIL # BLD AUTO: 0.1 X10(3) UL (ref 0–0.7)
EOSINOPHIL NFR BLD AUTO: 1.4 %
ERYTHROCYTE [DISTWIDTH] IN BLOOD BY AUTOMATED COUNT: 12.1 % (ref 11–15)
ERYTHROCYTE [SEDIMENTATION RATE] IN BLOOD: 15 MM/HR
HCT VFR BLD AUTO: 42.6 %
HGB BLD-MCNC: 13.8 G/DL
IMM GRANULOCYTES # BLD AUTO: 0.02 X10(3) UL (ref 0–1)
IMM GRANULOCYTES NFR BLD: 0.3 %
LYMPHOCYTES # BLD AUTO: 1.8 X10(3) UL (ref 1–4)
LYMPHOCYTES NFR BLD AUTO: 25.6 %
MCH RBC QN AUTO: 31.2 PG (ref 26–34)
MCHC RBC AUTO-ENTMCNC: 32.4 G/DL (ref 31–37)
MCV RBC AUTO: 96.2 FL
MONOCYTES # BLD AUTO: 0.57 X10(3) UL (ref 0.1–1)
MONOCYTES NFR BLD AUTO: 8.1 %
NEUTROPHILS # BLD AUTO: 4.46 X10 (3) UL (ref 1.5–7.7)
NEUTROPHILS # BLD AUTO: 4.46 X10(3) UL (ref 1.5–7.7)
NEUTROPHILS NFR BLD AUTO: 63.6 %
PLATELET # BLD AUTO: 295 10(3)UL (ref 150–450)
RBC # BLD AUTO: 4.43 X10(6)UL
RHEUMATOID FACT SERPL-ACNC: <10 IU/ML (ref ?–14)
URATE SERPL-MCNC: 3.8 MG/DL
WBC # BLD AUTO: 7 X10(3) UL (ref 4–11)

## 2023-12-11 PROCEDURE — 86431 RHEUMATOID FACTOR QUANT: CPT

## 2023-12-11 PROCEDURE — 1125F AMNT PAIN NOTED PAIN PRSNT: CPT | Performed by: NURSE PRACTITIONER

## 2023-12-11 PROCEDURE — 1160F RVW MEDS BY RX/DR IN RCRD: CPT | Performed by: NURSE PRACTITIONER

## 2023-12-11 PROCEDURE — 36415 COLL VENOUS BLD VENIPUNCTURE: CPT

## 2023-12-11 PROCEDURE — 86225 DNA ANTIBODY NATIVE: CPT

## 2023-12-11 PROCEDURE — 99214 OFFICE O/P EST MOD 30 MIN: CPT | Performed by: NURSE PRACTITIONER

## 2023-12-11 PROCEDURE — 86140 C-REACTIVE PROTEIN: CPT

## 2023-12-11 PROCEDURE — 84550 ASSAY OF BLOOD/URIC ACID: CPT

## 2023-12-11 PROCEDURE — 3008F BODY MASS INDEX DOCD: CPT | Performed by: NURSE PRACTITIONER

## 2023-12-11 PROCEDURE — 3078F DIAST BP <80 MM HG: CPT | Performed by: NURSE PRACTITIONER

## 2023-12-11 PROCEDURE — 86038 ANTINUCLEAR ANTIBODIES: CPT

## 2023-12-11 PROCEDURE — 85025 COMPLETE CBC W/AUTO DIFF WBC: CPT

## 2023-12-11 PROCEDURE — 85652 RBC SED RATE AUTOMATED: CPT

## 2023-12-11 PROCEDURE — 3075F SYST BP GE 130 - 139MM HG: CPT | Performed by: NURSE PRACTITIONER

## 2023-12-11 PROCEDURE — 1159F MED LIST DOCD IN RCRD: CPT | Performed by: NURSE PRACTITIONER

## 2023-12-11 NOTE — TELEPHONE ENCOUNTER
Action Requested: Summary for Provider     []  Critical Lab, Recommendations Needed  [] Need Additional Advice  []   FYI    []   Need Orders  [] Need Medications Sent to Pharmacy  []  Other     SUMMARY: Appt scheduled for today    Reason for call: Arthritis  Onset: 1 month    Patient calling (identified name and )  states she has been having a lot of joint pain in her thumbs, index finger in right hand, right elbow, and both large toes. Is also currently seeing a chiropractor for sciatica pain and plantar facialis. Joint pain has been ongoing but the index finger and right elbow pain started 1 month ago and is becoming worse. Using OTC topical creams and CBD gel with tylenol with only temproary relief. See care advise provided. Patient verbalized understanding and agrees with plan.        Future Appointments   Date Time Provider [place] Roman   2023 11:40 AM MOLLY Peña [facility] [place]   2024 11:30 AM DO SHERICE Gotti [facility]      Reason for Disposition   Patient wants to be seen    Protocols used: Finger Pain-A-[place]

## 2023-12-12 LAB
DSDNA IGG SERPL IA-ACNC: 1.1 IU/ML
ENA AB SER QL IA: 0.3 UG/L
ENA AB SER QL IA: NEGATIVE

## 2024-01-03 ENCOUNTER — OFFICE VISIT (OUTPATIENT)
Dept: PHYSICAL MEDICINE AND REHAB | Facility: CLINIC | Age: 74
End: 2024-01-03
Payer: COMMERCIAL

## 2024-01-03 VITALS — BODY MASS INDEX: 20.58 KG/M2 | WEIGHT: 109 LBS | HEIGHT: 61 IN

## 2024-01-03 DIAGNOSIS — M18.11 OSTEOARTHRITIS OF CARPOMETACARPAL (CMC) JOINT OF RIGHT THUMB, UNSPECIFIED OSTEOARTHRITIS TYPE: ICD-10-CM

## 2024-01-03 DIAGNOSIS — M77.11 LATERAL EPICONDYLITIS OF RIGHT ELBOW: Primary | ICD-10-CM

## 2024-01-03 NOTE — PATIENT INSTRUCTIONS
-Counterforce elbow strap for the elbow during the day  -Ice and Volataren gel  -Follow up in 4 weeks  -if no better, will discuss injection   -Start OT and home exercises

## 2024-01-04 NOTE — PROGRESS NOTES
[facility]  NEW PATIENT EVALUATION    Consultation as a request of Dr. Finch      HISTORY OF PRESENT ILLNESS:     Chief Complaint   Patient presents with    Elbow Pain     New right handed patient comes in for right elbow pain that radiates into forearm, denies N/T. Pain started 7 weeks ago, after a lot window cleaning.  Chiropractor visits 5x. HEP. Uses Voltaren, CBD roll on and Arnica. Tylenol PRN. Rates pain 5/10.    Finger Pain     C/o right 1st and 2nd digit pain, localized. Pain started on Thanksgiving after cleaning. Labs ordered by MOLLY Whipple completed. No tx. Rates pain 5/10.       The patient is a 73 year old female with significant past medical history of anemia, anxiety, GERD, hepatitis, hyperlipidemia, VISHAL V, pulmonary emphysema, rheumatic fever who presents with right lateral elbow pain.  Pain is worse with increased activity and usage of the hand and wrist.  Pain has been ongoing for the last several months.  Pain is rated 5 out of 10.  She also has pain in bilateral thumbs.  She rates this pain to be 5 out of 10 as well.  She states the pain has worsened 7 weeks ago in the elbow after doing a lot of window cleaning.  She has seen chiropractor and applied topical treatment as well as taking Tylenol without any significant improvement.  She denies any numbness tingling or neck pain but does report some weakness in the right hand with  strength as a result pain..     PHYSICAL EXAM:   Ht 61\"   Wt 109 lb (49.4 kg)   BMI 20.60 kg/m²       WRIST/HAND:  Inspection: no erythema, swelling, or obvious deformity  Palpation: Tenderness to palpation on the lateral epicondyle and the elbow and CMC joint of the right hand more than left  ROM: intact to all planes of motion  Strength: Mildly decreased  strength  Sensation: Intact to light touch in all dermatomes of the lower extremities  Tinel's test: negative for reproducible symptoms  Cozen's test:  Positive    IMAGING:     None    All imaging results were reviewed and discussed with patient.      ASSESSMENT/PLAN:     1. Lateral epicondylitis of right elbow    2. Osteoarthritis of carpometacarpal (CMC) joint of right thumb, unspecified osteoarthritis type        Breanna Mccarthy is a pleasant 73-year-old female who presents today for evaluation of right lateral elbow pain from lateral epicondylitis.  I recommended starting counterforce elbow strap using ice and Voltaren gel as tolerated and starting OT program with home exercises.  Recommend she follow-up in 4 weeks if no better we will discuss possible ultrasound-guided corticosteroid injection.  Have advised her to use topical treatment for the bilateral CMC joints as well.      The patient verbalized understanding with the plan and was in agreement. All questions/concerns were addressed and there were no barriers to learning.  Please note Dragon dictation software was used to dictate this note and may result in inadvertent typos.    Ivan Grey DO, FAAPMR & CAQSM  Physical Medicine and Rehabilitation  Sports and Spine Medicine    PAST MEDICAL HISTORY:     Past Medical History:   Diagnosis Date    Anemia     Anxiety state, unspecified     Arthritis     Esophageal reflux     H/O cystoscopy ,     H/O mammogram 2000    H/O sigmoidoscopy 2000    Hepatitis     High cholesterol     Lipid screening 10/26/2013    per NG    Mitral valve disorder     per NG: slighty thickened mitral valve in early s as per pt. - No treatment    Other ill-defined conditions(799.89)     per NG: \"tonsils ? \"; \"hepatitis ?\"    Panic attacks     Pneumonia     PONV (postoperative nausea and vomiting)     Pregnancy     per NG: \"child birth, 70,81,83\"    Pulmonary emphysema (HCC)     Rheumatic fever     Ruptured ectopic pregnancy     L salpingectomy     (spontaneous vaginal delivery)     x3         PAST SURGICAL HISTORY:     Past Surgical History:   Procedure  Laterality Date    COLONOSCOPY      TUBAL LIGATION  1983    postpartum         CURRENT MEDICATIONS:     Current Outpatient Medications   Medication Sig Dispense Refill    sucralfate 1 g Oral Tab Take 1 tablet (1 g total) by mouth 3 (three) times daily before meals. 90 tablet 2    estradiol 0.1 MG/GM Vaginal Cream Apply intravaginally 1 g twice a week as needed 42 g 2    hydrOXYzine 25 MG Oral Tab Take 1 tab p.o. at bedtime as needed 30 tablet 0    ALPRAZolam 0.25 MG Oral Tab Take 1 tablet (0.25 mg total) by mouth nightly as needed for Sleep. 90 tablet 1    diclofenac 1 % External Gel Use topically 4 times daily. 50 g 11    Calcium Carbonate (CALCIUM 500 OR) Take by mouth. Liquid      Vitamin D3, Cholecalciferol, 50 MCG (2000 UT) Oral Cap       Alum Hydroxide-Mag Carbonate (GAVISCON OR)       LOW-DOSE ASPIRIN OR       Melatonin 3 MG Oral Cap       Multiple Vitamins-Minerals (BIOTIN PLUS/CALCIUM/VIT D3) Oral Tab Take 1 tablet by mouth.      Emollient (COLLAGEN EX) Take by mouth daily. Collagen powder 2 tbsp       Probiotic Product (PROBIOTIC DAILY OR) Take 1 tablet by mouth daily.           ALLERGIES:     Allergies   Allergen Reactions    Dust Mites Coughing, Runny nose and Tightness in Throat    Amoxicillin RASH         FAMILY HISTORY:     Family History   Problem Relation Age of Onset    Other (renal failure) Mother         renal failure    Prostate Cancer Father     Diabetes Father     Lipids Sister         hyperlipidemia    Diabetes Sister         remove this; sister is listed below    Diabetes Sister     Lipids Sister     Pancreatic Cancer Brother     Cancer Brother         pancreatic    Diabetes Paternal Grandmother     Ovarian Cancer Maternal Cousin Female 35    Ovarian Cancer Paternal Cousin Female         40's    Diabetes Other         family h/o          SOCIAL HISTORY:     Social History     Socioeconomic History    Marital status:    Tobacco Use    Smoking status: Former     Packs/day: 0.50      Years: 9.00     Additional pack years: 0.00     Total pack years: 4.50     Types: Cigarettes     Quit date: 1980     Years since quittin.9    Smokeless tobacco: Never    Tobacco comments:     Light smoker starting as a teenager, lasting about 14 years   Vaping Use    Vaping Use: Never used   Substance and Sexual Activity    Alcohol use: Yes     Alcohol/week: 1.0 standard drink of alcohol     Comment: rarely    Drug use: No    Sexual activity: Not Currently   Other Topics Concern    Caffeine Concern Yes     Comment: tea, 1 cup          REVIEW OF SYSTEMS:   No patient-reported data collected this visit.      PHYSICAL EXAM:   General: No immediate distress  Head: Normocephalic/ Atraumatic  Eyes: Extra-occular movements intact.   Ears: No auricular hematoma or deformities  Mouth: No lesions or ulcerations  Heart: peripheral pulses intact. Normal capillary refill.   Lungs: Non-labored respirations  Abdomen: No abdominal guarding  Extremities: No lower extremity edema bilaterally   Skin: No lesions noted   Cognition: alert & oriented x 3, attentive, able to follow 2 step commands, comprehention intact, spontaneous speech intact  Psychiatric: Mood and affect appropriate      LABS:     Lab Results   Component Value Date     2023    A1C 5.6 2023     Lab Results   Component Value Date    WBC 7.0 2023    RBC 4.43 2023    HGB 13.8 2023    HCT 42.6 2023    MCV 96.2 2023    MCH 31.2 2023    MCHC 32.4 2023    RDW 12.1 2023    .0 2023    MPV 9.5 01/10/2019     Lab Results   Component Value Date    GLU 94 2023    BUN 16 2023    BUNCREA 18.8 2023    CREATSERUM 0.85 2023    ANIONGAP 5 2023    GFR >60 2015    GFRNAA 68 2022    GFRAA 78 2022    CA 9.5 2023    OSMOCALC 295 2023    ALKPHO 76 2023    AST 25 2023    ALT 29 2023    ALKPHOS 76 2016    BILT 0.9 2023     TP 7.2 01/27/2023    ALB 3.5 01/27/2023    GLOBULIN 3.7 01/27/2023    AGRATIO 1.3 02/29/2016     01/27/2023    K 4.0 01/27/2023     01/27/2023    CO2 28.0 01/27/2023     Lab Results   Component Value Date    PT 12.5 09/20/2012    INR 1.0 09/20/2012     Lab Results   Component Value Date    VITD 54.6 10/16/2018

## 2024-01-09 ENCOUNTER — MED REC SCAN ONLY (OUTPATIENT)
Dept: PHYSICAL MEDICINE AND REHAB | Facility: CLINIC | Age: 74
End: 2024-01-09

## 2024-01-31 ENCOUNTER — OFFICE VISIT (OUTPATIENT)
Dept: PHYSICAL MEDICINE AND REHAB | Facility: CLINIC | Age: 74
End: 2024-01-31
Payer: COMMERCIAL

## 2024-01-31 DIAGNOSIS — M77.11 LATERAL EPICONDYLITIS OF RIGHT ELBOW: Primary | ICD-10-CM

## 2024-01-31 PROCEDURE — 99214 OFFICE O/P EST MOD 30 MIN: CPT | Performed by: PHYSICAL MEDICINE & REHABILITATION

## 2024-01-31 NOTE — PROGRESS NOTES
[facility]  OFFICE FOLLOW UP EVALUATION      HISTORY OF PRESENT ILLNESS:     Chief Complaint   Patient presents with    Elbow Pain     LOV: 1/324 Patient f/u on right elbow pain that radiates into forearm, denies N/T. Wears brace and uses Volatren. Rates pain 3/10.       Patient is following up for lateral wall pain.  She states she has been attending occupational therapy and doing home exercises.  She has noted good improvement.  She has improvement in strength and mobility.  Pain is not as severe but she still has discomfort rated 3 out of 10.  Denies any radiating symptoms.  She is using topical Voltaren gel and bracing as tolerated.  Overall, she is happy with her progress    PHYSICAL EXAM:   There were no vitals taken for this visit.    WRIST/HAND:  Inspection: no erythema, swelling, or obvious deformity  Palpation: Tenderness to palpation on the lateral epicondyle and the elbow and CMC joint of the right hand more than left  ROM: intact to all planes of motion  Strength: Mildly decreased  strength  Sensation: Intact to light touch in all dermatomes of the lower extremities  Tinel's test: negative for reproducible symptoms  Cozen's test: Positive    IMAGING:     None    All imaging results were reviewed and discussed with patient.      ASSESSMENT/PLAN:     1. Lateral epicondylitis of right elbow        Breanna Mccarthy is a 73 year old female following up for right lateral elbow pain from epicondylitis.  She is doing well with occupational therapy program we have discussed continuing this for a few more weeks.  Recommend she continue bracing and topical treatment with Voltaren gel and ice as tolerated.  She will follow-up in 6 weeks.  If she continues to be limited with her progress we will consider ultrasound-guided corticosteroid injection.      The patient verbalized understanding with the plan and was in agreement. All questions/concerns were  addressed and there were no barriers to learning.  Please note Dragon dictation software was used to dictate this note and may result in inadvertent typos.    Ivan Grey DO, FAAPMR & CAQSM  Physical Medicine and Rehabilitation  Sports and Spine Medicine    PAST MEDICAL HISTORY:     Past Medical History:   Diagnosis Date    Anemia     Anxiety state, unspecified     Arthritis     Esophageal reflux     H/O cystoscopy ,     H/O mammogram 2000    H/O sigmoidoscopy 2000    Hepatitis     High cholesterol     Lipid screening 10/26/2013    per NG    Mitral valve disorder     per NG: slighty thickened mitral valve in early  as per pt. - No treatment    Other ill-defined conditions(799.89)     per NG: \"tonsils ? \"; \"hepatitis ?\"    Panic attacks     Pneumonia     PONV (postoperative nausea and vomiting)     Pregnancy     per NG: \"child birth, 70,81,83\"    Pulmonary emphysema (HCC)     Rheumatic fever     Ruptured ectopic pregnancy     L salpingectomy     (spontaneous vaginal delivery)     x3         PAST SURGICAL HISTORY:     Past Surgical History:   Procedure Laterality Date    COLONOSCOPY      TUBAL LIGATION      postpartum         CURRENT MEDICATIONS:     Current Outpatient Medications   Medication Sig Dispense Refill    sucralfate 1 g Oral Tab Take 1 tablet (1 g total) by mouth 3 (three) times daily before meals. 90 tablet 2    estradiol 0.1 MG/GM Vaginal Cream Apply intravaginally 1 g twice a week as needed 42 g 2    hydrOXYzine 25 MG Oral Tab Take 1 tab p.o. at bedtime as needed 30 tablet 0    ALPRAZolam 0.25 MG Oral Tab Take 1 tablet (0.25 mg total) by mouth nightly as needed for Sleep. 90 tablet 1    diclofenac 1 % External Gel Use topically 4 times daily. 50 g 11    Calcium Carbonate (CALCIUM 500 OR) Take by mouth. Liquid      Vitamin D3, Cholecalciferol, 50 MCG (2000) Oral Cap       Alum Hydroxide-Mag Carbonate (GAVISCON OR)       LOW-DOSE ASPIRIN OR       Melatonin 3 MG Oral Cap        Multiple Vitamins-Minerals (BIOTIN PLUS/CALCIUM/VIT D3) Oral Tab Take 1 tablet by mouth.      Emollient (COLLAGEN EX) Take by mouth daily. Collagen powder 2 tbsp       Probiotic Product (PROBIOTIC DAILY OR) Take 1 tablet by mouth daily.           ALLERGIES:     Allergies   Allergen Reactions    Dust Mites Coughing, Runny nose and Tightness in Throat    Amoxicillin RASH         FAMILY HISTORY:     Family History   Problem Relation Age of Onset    Other (renal failure) Mother         renal failure    Prostate Cancer Father     Diabetes Father     Lipids Sister         hyperlipidemia    Diabetes Sister         remove this; sister is listed below    Diabetes Sister     Lipids Sister     Pancreatic Cancer Brother     Cancer Brother         pancreatic    Diabetes Paternal Grandmother     Ovarian Cancer Maternal Cousin Female 35    Ovarian Cancer Paternal Cousin Female         40's    Diabetes Other         family h/o          SOCIAL HISTORY:     Social History     Socioeconomic History    Marital status:    Tobacco Use    Smoking status: Former     Packs/day: 0.50     Years: 12.00     Additional pack years: 0.00     Total pack years: 6.00     Types: Cigarettes     Quit date: 1980     Years since quittin.0    Smokeless tobacco: Never    Tobacco comments:     Light smoker starting as a teenager, lasting about 14 years   Vaping Use    Vaping Use: Never used   Substance and Sexual Activity    Alcohol use: Yes     Alcohol/week: 1.0 standard drink of alcohol     Comment: rarely    Drug use: No    Sexual activity: Not Currently   Other Topics Concern    Caffeine Concern Yes     Comment: tea, 1 cup          REVIEW OF SYSTEMS:   A comprehensive 10 point review of systems was completed.  Pertinent positives and negatives noted in the the HPI.      LABS:     Lab Results   Component Value Date     2023    A1C 5.6 2023     Lab Results   Component Value Date    WBC 7.0 2023    RBC 4.43  12/11/2023    HGB 13.8 12/11/2023    HCT 42.6 12/11/2023    MCV 96.2 12/11/2023    MCH 31.2 12/11/2023    MCHC 32.4 12/11/2023    RDW 12.1 12/11/2023    .0 12/11/2023    MPV 9.5 01/10/2019     Lab Results   Component Value Date    GLU 94 01/27/2023    BUN 16 01/27/2023    BUNCREA 18.8 01/27/2023    CREATSERUM 0.85 01/27/2023    ANIONGAP 5 01/27/2023    GFR >60 11/30/2015    GFRNAA 68 02/25/2022    GFRAA 78 02/25/2022    CA 9.5 01/27/2023    OSMOCALC 295 01/27/2023    ALKPHO 76 01/27/2023    AST 25 01/27/2023    ALT 29 01/27/2023    ALKPHOS 76 02/29/2016    BILT 0.9 01/27/2023    TP 7.2 01/27/2023    ALB 3.5 01/27/2023    GLOBULIN 3.7 01/27/2023    AGRATIO 1.3 02/29/2016     01/27/2023    K 4.0 01/27/2023     01/27/2023    CO2 28.0 01/27/2023     Lab Results   Component Value Date    PT 12.5 09/20/2012    INR 1.0 09/20/2012     Lab Results   Component Value Date    VITD 54.6 10/16/2018

## 2024-02-08 NOTE — TELEPHONE ENCOUNTER
From: Kenrick Romero  To: Carrie lKine  Sent: 9/22/2023 9:08 AM CDT  Subject: When to take evening famotidine and... Jagjit Coe,   Very nice meeting with you yesterday. Should I take my evening famotidine before dinner or before bedtime? Also, in the morning is it better to take by probiotic and Vital Fiber before or after I take famotidine? Thank you! [facility] Neurosurgery Daily Progress Note    Patient: Cathie Calvillo Date: 2024   : 1981 Attending: Susan Palacios MD   Admit Date: 2024 Room/Bed: [place] [address]   42 year old female      SUBJECTIVE:  Patient seen and examined, sitting up in the chair at bedside, in NAD. Reporting severe incisional pain. Feels like \"someone is stabbing me in the head\". Improved with fentanyl. Neuro exam unchanged. No acute events overnight.     Review of Systems   Constitutional:  Negative for chills and fever.   Eyes:  Negative for photophobia and visual disturbance.   Respiratory:  Negative for shortness of breath.    Cardiovascular:  Negative for chest pain.   Gastrointestinal:  Negative for abdominal pain, nausea and vomiting.   Musculoskeletal:  Negative for back pain.   Neurological:  Positive for headaches. Negative for weakness.     OBJECTIVE:    Medications/Infusions:  Scheduled:   Current Facility-Administered Medications   Medication Dose Route Frequency Provider Last Rate Last Admin    polyethylene glycol (MIRALAX) packet 17 g  17 g Oral Daily Jacquelyn Caceres PA-C        dexAMETHasone (DECADRON) injection 4 mg  4 mg Intravenous 4 times per day Courtney Martinez CNP   4 mg at 24 0553    docusate sodium-sennosides (SENOKOT S) 50-8.6 MG 2 tablet  2 tablet Oral Nightly Courtney Martinez CNP   2 tablet at 24    insulin lispro (ADMELOG,HumaLOG) - Correction Dose   Subcutaneous 4x Daily AC & HS Courtney Martinez CNP   1 Units at 24    famotidine (PEPCID) injection 20 mg  20 mg Intravenous 2 times per day Courtney Martinez CNP   20 mg at 24    ferrous sulfate (65 mg Fe per 325 mg) tablet 325 mg  325 mg Oral BID WC Susan Palacios MD   325 mg at 24    [Held by provider] heparin (porcine) injection 5,000 Units  5,000 Units Subcutaneous 3 times per day Kelsi Marte DO   5,000 Units at 24 2200    sodium chloride  0.9 % injection 2 mL  2 mL Intracatheter 2 times per day Sailaja Hopper APNP   2 mL at 02/07/24 2034    thiamine (VITAMIN B1) tablet 100 mg  100 mg Oral Daily Sailaja Hopper APNP   100 mg at 02/06/24 0842    folic acid (FOLATE) tablet 1 mg  1 mg Oral Daily Sailaja Hopper APNP   1 mg at 02/06/24 0842    gabapentin (NEURONTIN) capsule 300 mg  300 mg Oral 3 times per day Sailaja Hopper APNP        levETIRAcetam (KepPRA INJECT) injection 750 mg  750 mg Intravenous 2 times per day Maddie Bills MD   750 mg at 02/07/24 2032    pneumococcal 20-valent vaccine (PREVNAR 20) injection 0.5 mL  0.5 mL Intramuscular Once Sailaja Hopper APNP        influenza virus quadrivalent vaccine inactivated (PRESERVATIVE FREE) injection 0.5 mL  0.5 mL Intramuscular Once Sailaja Hopper APNP        Potassium Standard Replacement Protocol (Levels 3.5 and lower)   Does not apply See Admin Instructions Sailaja Hopper APNP        Magnesium Standard Replacement Protocol   Does not apply See Admin Instructions Sailaja Hopper APNP        Phosphorus Standard Replacement Protocol   Does not apply See Admin Instructions Sailaja Hopper APNP        amLODIPine (NORVASC) tablet 5 mg  5 mg Oral Daily Kelsi Marte DO   5 mg at 02/06/24 0842       PRN:    Current Facility-Administered Medications   Medication Dose Route Frequency Provider Last Rate Last Admin    sodium chloride 0.9% infusion   Intravenous Continuous PRN Jacquelyn Caceres PA-C        sodium chloride 0.9% infusion   Intravenous Continuous PRN Jacquelyn Caceres PA-C        fentaNYL (SUBLIMAZE) injection 50 mcg  50 mcg Intravenous Q1H PRN Courtney Martinez CNP   50 mcg at 02/08/24 0744    ondansetron (ZOFRAN) injection 4 mg  4 mg Intravenous Q6H PRN Courtney Martinez CNP   4 mg at 02/07/24 2038    sodium chloride 0.9 % flush bag 25 mL  25 mL Intravenous PRN Courtney Martinez CNP        polyethylene glycol  (MIRALAX) packet 17 g  17 g Oral Daily PRN Courtney Martinez CNP        bisacodyl (DULCOLAX) suppository 10 mg  10 mg Rectal Daily PRN Courtney Martinez CNP        magnesium hydroxide (MILK OF MAGNESIA) 400 MG/5ML suspension 30 mL  30 mL Oral Daily PRN Courtney Martinez CNP        dextrose 50 % injection 25 g  25 g Intravenous PRN Courtney Martinez, CNP        dextrose 50 % injection 12.5 g  12.5 g Intravenous PRN Courtney Martinez CNP        glucagon (GLUCAGEN) injection 1 mg  1 mg Intramuscular PRN Courtney Martinez CNP        dextrose (GLUTOSE) 40 % gel 15 g  15 g Oral PRN Courtney Martinez CNP        dextrose (GLUTOSE) 40 % gel 30 g  30 g Oral PRN Courtney Martinez CNP        HYDROcodone-acetaminophen (NORCO) 7.5-325 MG per tablet 2 tablet  2 tablet Oral Q6H PRN Sailaja Hopper, APNP   2 tablet at 02/08/24 0246    butalbital-APAP-caffeine (FIORICET) -40 MG tablet 1 tablet  1 tablet Oral Q4H PRN Yuridia Vo MD   1 tablet at 02/06/24 1646    acetaminophen (TYLENOL) tablet 650 mg  650 mg Oral Q4H PRN Sailaja Hopper, APNP   650 mg at 02/06/24 1646    Or    acetaminophen (TYLENOL) suppository 650 mg  650 mg Rectal Q4H PRN Sailaja Hopepr, APNP        hydrALAZINE (APRESOLINE) injection 10 mg  10 mg Intravenous Q4H PRN Sailaja Hopper, APNP   10 mg at 02/02/24 0912    labetalol (NORMODYNE) injection 10 mg  10 mg Intravenous Q4H PRN Sailaja Hopper, APNP   10 mg at 02/02/24 1017    sodium chloride 0.9 % flush bag 25 mL  25 mL Intravenous PRN Sailaja Hopper APNP              Vital Last Value 24 Hour Range   Temperature 98.4 °F (36.9 °C) (02/08/24 0800) Temp  Min: 97.9 °F (36.6 °C)  Max: 98.8 °F (37.1 °C)   Pulse 80 (02/08/24 0700) Pulse  Min: 78  Max: 97   Respiratory 14 (02/08/24 0700) Resp  Min: 13  Max: 21   Non-Invasive  Blood Pressure 119/76 (02/08/24 0600) BP  Min: 110/69  Max: 151/96   Arterial   Blood  Pressure   No data recorded   Pulse Oximetry 99 % (02/08/24 0700) SpO2  Min: 95 %  Max: 99 %       Intake/Output:      Intake/Output Summary (Last 24 hours) at 2/8/2024 0842  Last data filed at 2/8/2024 0559  Gross per 24 hour   Intake 1215 ml   Output 2050 ml   Net -835 ml       Bowel movements 1 (02/05/24 0530)    Physical Exam:   Constitutional:  No acute distress.    Integument:  Warm. Dry.  HENT:  Normocephalic. Atraumatic. Incision covered, c/d/I   Eyes:  PERRL, EOM intact  Neck: Supple.   Cardiac:  Peripheral perfusion appears normal.  GI:  Soft.  Respiratory:  No respiratory distress noted.  Neuro:  Alert, Oriented x3. Answers questions and follows commands appropriately. Speech fluent. Cranial nerves II-XII grossly intact. BUE 5/5. BLE 5/5. Sensation intact to light touch throughout. No Chirinos's. No clonus. No drift. No dysmetria.   Psych:  Cooperative, appropriate mood and affect.    Laboratory Results:  CBC  Recent Labs   Lab 02/08/24  0231 02/07/24  0519 02/03/24  0534   WBC 14.4* 10.0 8.6   HCT 30.3* 31.2* 28.4*   HGB 8.9* 9.3* 8.7*    308 259         CMP  Recent Labs   Lab 02/08/24  0231 02/07/24  0519 02/03/24  0534 02/02/24  0406 02/01/24  1921   SODIUM 135 135 139 139 140   POTASSIUM 4.2 4.1 3.6 3.4 4.3   CHLORIDE 103 106 109 111* 101   CO2 27 25 23 23 14*   GLUCOSE 128* 103* 98 96 190*   BUN 8 14 7 7 8   CREATININE 0.78 0.92 1.05* 0.87 0.83   CALCIUM 9.5 9.0 9.1 8.7 8.6   TOTPROTEIN  --   --   --  6.8 8.1   ALBUMIN  --   --   --  3.0* 3.4*   BILIRUBIN  --   --   --  0.3 0.2   AST  --   --   --  45* 52*   GPT  --   --   --  32 45   ALKPT  --   --   --  96 110         Coags  Recent Labs   Lab 02/07/24  0519 02/06/24  0535 02/05/24  0539 02/03/24  0534 02/02/24  0503   INR 1.0 1.0 1.0   < > 1.0   PTT 23  --   --   --  21*    < > = values in this interval not displayed.           Microbiology:  Microbiology Results       None               Imaging:   CT HEAD WO CONTRAST    Result Date:  2/3/2024  HISTORY:SDH follow uo. 5' 5\" ft TECHNIQUE: CT of the head is obtained without contrast. COMPARISONS: 2024, 2024 RESULTS: No significant interval change in the left hemispheric subdural hematoma. Minimal local mass effect. No midline shift. No new hemorrhage. Basal cisterns are preserved. Exam is otherwise grossly stable.     No significant interval change in the left frontal subdural hematoma as described. Electronically Signed by: MARISEL BLANCO MD Signed on: 2/3/2024 1:05 AM Workstation ID: [id number]    EEG    Result Date: 2024  Nisreen Mays DO     2024  1:52 PM ELECTROENCEPHALOGRAM (EEG) REPORT Patient name:Cathie Calvillo : 1981 MRN: [id number] Ordering povider: Kelsi Marte DO METHODS: Twenty-four electrodes were applied according to the 10-20-electrode placement system and one EKG channel monitoring, monopolar and bipolar montages are routinely utilized. The record was obtained on a digital system. OBJECT: Patient information Cathie Calvillo is a 42 year old f with PMH significant for recent ETOH abuse , who presented with seizure. The EEG was requested to assess for epileptiform activity. State(s) of consciousness:  Awake to sleep Relevant medications:  Levetiracetam Gabapentin FINDINGS: Background: During wakefulness there was a posterior background of regular, reactive, symmetrical, moderate voltage, 7-8 Hz activity and 20-40 microvolt amplitude with an anterior background of lower voltage, faster frequencies. During drowsiness, there was symmetrical slowing and attenuation of the waking background as well as the occurrence of symmetrical vertex sharp waves, and during stage II sleep there were symmetrical sleep spindles and K-complexes. Abnormalities:  No abnormalities are noted during this recording. Activation Procedures: None Events: None ECG: The ECG was predominantly normal sinus rhythm. IMPRESSION: This EEG is an abnormal study recorded  in the awake to sleep state due to slowing of the posterior dominant background during brief wakefulness. These findings are consistent with a mild encephalopathy of undetermined etiology. No interictal activity or lateralized slowing was noted during this epoch. No seizures were recorded. The absence of epileptiform abnormalities does not preclude a diagnosis of seizure Report covers Start: 1126 on 2/2/2024 End: 1149 on 2/2/2024 Reading Physician: Nisreen Mays, DO Neurocritical Care and Epilepsy    '      IMPRESSION/PLAN:  42 year old female with no significant PMHx who presented to [facility] ED on 2/1 s/p seizure.      2/1 CTH with large, 8-9mm, left frontal/pterional SDH with local mass effect and effacement of cortical sulci.        CT cervical spine without acute fracture or subluxation.  2/2 rCTH with interval evolution of acute SDH overlying the left frontoparietal convexity, similar in appearance from prior examination. No new hemorrhage.        24 hour rCTH stable  2/5 rCTH shows unchanged hypoattenuating expansile process in R frontal lobe with stable L SDH  2/6 MRI b w wo shows non- enhancing T2 hyperintense lesion in R frontal lobe involving cortices and subcortical white matter or R middle frontal gyrus measuring about 3 cm with thin intra-gyral blood products involving lesion with no associated necrosis     Dx: Left SDH, R Frontal Brain Lesion    2/6 Dr. Arteaga discussed MRI brain findings with patient at bedside. Recommending craniotomy for biopsy and resection of lesion. Discussed risks of surgery including but not limited to: infection, brain hemorrhage, and stroke. Patient verbalized understanding. All questions answered. Consent signed.    POD#1 R craniotomy for resection of brain lesion (2/7/24)  Final pathology pending  Postop CTH with expected postop changes, stable L SDH     Plan:  - Neurosurgery to change first dressing  - Continue dex 4q6, GI ppx   - Serial neuro checks, notify of changes  -  Continue AEDs, mgmt per neurology  - Maintain SBP < 160  - ICP precautions: HOB elevated, pain control  - Avoid hyponatremia  - Neurology following  - Recommend heme/onc, radiation oncology consult  - Activity as tolerated, ok for PT/OT  - Diet as tolerated  - Pain control prn  - Prophylaxis: SCDs, ok for chemoppx  - Medical management per primary, appreciate  - Dispo: floor    Reviewed imaging and discussed with  Dr. Karen Arteaga.

## 2024-02-12 RX ORDER — SUCRALFATE 1 G/1
1 TABLET ORAL
Qty: 90 TABLET | Refills: 2 | Status: SHIPPED | OUTPATIENT
Start: 2024-02-12

## 2024-02-12 NOTE — TELEPHONE ENCOUNTER
Requested Prescriptions     Pending Prescriptions Disp Refills    SUCRALFATE 1 g Oral Tab [Pharmacy Med Name: SUCRALFATE 1GM TABLETS] 90 tablet 2     Sig: TAKE 1 TABLET(1 GRAM) BY MOUTH THREE TIMES DAILY BEFORE MEALS         LOV  9/21/2023 with Adilene STEVE    11/02/2023      VT

## 2024-02-22 ENCOUNTER — MED REC SCAN ONLY (OUTPATIENT)
Dept: PHYSICAL MEDICINE AND REHAB | Facility: CLINIC | Age: 74
End: 2024-02-22

## 2024-03-01 ENCOUNTER — PATIENT MESSAGE (OUTPATIENT)
Facility: CLINIC | Age: 74
End: 2024-03-01

## 2024-03-01 NOTE — TELEPHONE ENCOUNTER
From: Breanna Mccarthy  To: Adilene Sheridan  Sent: 3/1/2024 10:31 AM CST  Subject: Progress report    Adilene Willett,   Dr. Bashir suggested I follow-up with the practice after my endoscopy in November and my course of treatment with the sucralfate he prescribed. I was on the sucralfate, twice a day, for 8 weeks. It worked very well! I felt the best I had in years. I stopped taking it, however, to see how I would fare without it, since scheduling the timing of the pills was a bit difficult. I have been off of it for 6 weeks and I continue to do very well. To me this is amazing. I believe the protective barrier of the medication is what this hypersensitive esophagus of mine needed. If I should revert back to any GERD problems, I will immediately go back on it. Please update and thank Dr. Bashir for me. And I thank you too!

## 2024-03-13 ENCOUNTER — MED REC SCAN ONLY (OUTPATIENT)
Dept: PHYSICAL MEDICINE AND REHAB | Facility: CLINIC | Age: 74
End: 2024-03-13

## 2024-03-14 ENCOUNTER — OFFICE VISIT (OUTPATIENT)
Dept: PHYSICAL MEDICINE AND REHAB | Facility: CLINIC | Age: 74
End: 2024-03-14
Payer: MEDICARE

## 2024-03-14 VITALS — BODY MASS INDEX: 20.58 KG/M2 | HEIGHT: 61 IN | WEIGHT: 109 LBS

## 2024-03-14 DIAGNOSIS — M77.11 LATERAL EPICONDYLITIS OF RIGHT ELBOW: Primary | ICD-10-CM

## 2024-03-14 DIAGNOSIS — M18.11 OSTEOARTHRITIS OF CARPOMETACARPAL (CMC) JOINT OF RIGHT THUMB, UNSPECIFIED OSTEOARTHRITIS TYPE: ICD-10-CM

## 2024-03-14 PROCEDURE — 99214 OFFICE O/P EST MOD 30 MIN: CPT | Performed by: PHYSICAL MEDICINE & REHABILITATION

## 2024-03-14 NOTE — PATIENT INSTRUCTIONS
-Continue home exercises  -Brace as needed  -Ice as needed  -Follow up as needed  -Call if you would like big toe injection

## 2024-03-14 NOTE — PROGRESS NOTES
[facility]  OFFICE FOLLOW UP EVALUATION      HISTORY OF PRESENT ILLNESS:     Chief Complaint   Patient presents with    Elbow Pain     LOV: 1/31/24 Patient f/u on right elbow pain that triggers first digit, denies N/T. Completed OT. Wears brace PRN. Rates pain 0/10.       Patient is following up for right elbow pain.  She has responded really well to OT and home exercises.  She has able to make a full fist and  without reproduction of pain.  She denies any radiating symptoms, any changes in strength or bowel bladder.  Rates her pain to be 0 out of 10 at this point.  At nighttime she does get a little aggravation but can change her position to relieve the pain.  She is however having worsening right big toe pain at the MTP joint.  She denies any erythema or swelling.  She denies any numbness.  She has had history of bunionectomy at the toe.  She was offered a toe fusion surgery but would like to defer.    PHYSICAL EXAM:   Ht 61\"   Wt 109 lb (49.4 kg)   BMI 20.60 kg/m²     Tenderness to palpation of the right MTP joint.  Nontender palpation of the right lateral epicondyle.  Negative Cozen's    IMAGING:     X-ray right foot completed on 8/8/2023 is notable for mild bilateral osteoarthritis of the first MTP joint    All imaging results were reviewed and discussed with patient.      ASSESSMENT/PLAN:     1. Lateral epicondylitis of right elbow    2. Osteoarthritis of carpometacarpal (CMC) joint of right thumb, unspecified osteoarthritis type        Breanna Mccarthy is a 73 year old female following up for right lateral elbow pain and new concern for right big toe pain at the MTP joint.  She has mild osteoarthritis at the joint.  She has done really well with OT with regards to her elbow pain.  I recommend she continue topical treatment and bracing as needed and home exercises.  With regards to her MTP joint pain I advised her we could consider ultrasound-guided  corticosteroid injection however she would like to defer for now.  Recommend she ice and use topical Voltaren gel and do home exercises for this as well      The patient verbalized understanding with the plan and was in agreement. All questions/concerns were addressed and there were no barriers to learning.  Please note Dragon dictation software was used to dictate this note and may result in inadvertent typos.    Ivan Grey DO, FAAPMR & CAQSM  Physical Medicine and Rehabilitation  Sports and Spine Medicine    PAST MEDICAL HISTORY:     Past Medical History:   Diagnosis Date    Anemia     Anxiety state, unspecified     Arthritis     Esophageal reflux     H/O cystoscopy ,     H/O mammogram 2000    H/O sigmoidoscopy 2000    Hepatitis     High cholesterol     Lipid screening 10/26/2013    per NG    Mitral valve disorder     per NG: slighty thickened mitral valve in early  as per pt. - No treatment    Other ill-defined conditions(799.89)     per NG: \"tonsils ? \"; \"hepatitis ?\"    Panic attacks     Pneumonia     PONV (postoperative nausea and vomiting)     Pregnancy (HCC)     per NG: \"child birth, 70,81,83\"    Pulmonary emphysema (HCC)     Rheumatic fever     Ruptured ectopic pregnancy (HCC)     L salpingectomy     (spontaneous vaginal delivery) (HCC)     x3         PAST SURGICAL HISTORY:     Past Surgical History:   Procedure Laterality Date    COLONOSCOPY      TUBAL LIGATION      postpartum         CURRENT MEDICATIONS:     Current Outpatient Medications   Medication Sig Dispense Refill    SUCRALFATE 1 g Oral Tab TAKE 1 TABLET(1 GRAM) BY MOUTH THREE TIMES DAILY BEFORE MEALS 90 tablet 2    estradiol 0.1 MG/GM Vaginal Cream Apply intravaginally 1 g twice a week as needed 42 g 2    hydrOXYzine 25 MG Oral Tab Take 1 tab p.o. at bedtime as needed 30 tablet 0    ALPRAZolam 0.25 MG Oral Tab Take 1 tablet (0.25 mg total) by mouth nightly as needed for Sleep. 90 tablet 1    diclofenac 1 %  External Gel Use topically 4 times daily. 50 g 11    Calcium Carbonate (CALCIUM 500 OR) Take by mouth. Liquid      Vitamin D3, Cholecalciferol, 50 MCG ( UT) Oral Cap       Alum Hydroxide-Mag Carbonate (GAVISCON OR)       LOW-DOSE ASPIRIN OR       Melatonin 3 MG Oral Cap       Multiple Vitamins-Minerals (BIOTIN PLUS/CALCIUM/VIT D3) Oral Tab Take 1 tablet by mouth.      Emollient (COLLAGEN EX) Take by mouth daily. Collagen powder 2 tbsp       Probiotic Product (PROBIOTIC DAILY OR) Take 1 tablet by mouth daily.           ALLERGIES:     Allergies   Allergen Reactions    Dust Mites Coughing, Runny nose and Tightness in Throat    Amoxicillin RASH         FAMILY HISTORY:     Family History   Problem Relation Age of Onset    Other (renal failure) Mother         renal failure    Prostate Cancer Father     Diabetes Father     Lipids Sister         hyperlipidemia    Diabetes Sister         remove this; sister is listed below    Diabetes Sister     Lipids Sister     Pancreatic Cancer Brother     Cancer Brother         pancreatic    Diabetes Paternal Grandmother     Ovarian Cancer Maternal Cousin Female 35    Ovarian Cancer Paternal Cousin Female         40's    Diabetes Other         family h/o          SOCIAL HISTORY:     Social History     Socioeconomic History    Marital status:    Tobacco Use    Smoking status: Former     Packs/day: 0.50     Years: 12.00     Additional pack years: 0.00     Total pack years: 6.00     Types: Cigarettes     Quit date: 1980     Years since quittin.1    Smokeless tobacco: Never    Tobacco comments:     Light smoker starting as a teenager, lasting about 14 years   Vaping Use    Vaping Use: Never used   Substance and Sexual Activity    Alcohol use: Yes     Alcohol/week: 1.0 standard drink of alcohol     Comment: rarely    Drug use: No    Sexual activity: Not Currently   Other Topics Concern    Caffeine Concern Yes     Comment: tea, 1 cup          REVIEW OF SYSTEMS:   A  comprehensive 10 point review of systems was completed.  Pertinent positives and negatives noted in the the HPI.      LABS:     Lab Results   Component Value Date     01/27/2023    A1C 5.6 01/27/2023     Lab Results   Component Value Date    WBC 7.0 12/11/2023    RBC 4.43 12/11/2023    HGB 13.8 12/11/2023    HCT 42.6 12/11/2023    MCV 96.2 12/11/2023    MCH 31.2 12/11/2023    MCHC 32.4 12/11/2023    RDW 12.1 12/11/2023    .0 12/11/2023    MPV 9.5 01/10/2019     Lab Results   Component Value Date    GLU 94 01/27/2023    BUN 16 01/27/2023    BUNCREA 18.8 01/27/2023    CREATSERUM 0.85 01/27/2023    ANIONGAP 5 01/27/2023    GFR >60 11/30/2015    GFRNAA 68 02/25/2022    GFRAA 78 02/25/2022    CA 9.5 01/27/2023    OSMOCALC 295 01/27/2023    ALKPHO 76 01/27/2023    AST 25 01/27/2023    ALT 29 01/27/2023    ALKPHOS 76 02/29/2016    BILT 0.9 01/27/2023    TP 7.2 01/27/2023    ALB 3.5 01/27/2023    GLOBULIN 3.7 01/27/2023    AGRATIO 1.3 02/29/2016     01/27/2023    K 4.0 01/27/2023     01/27/2023    CO2 28.0 01/27/2023     Lab Results   Component Value Date    PT 12.5 09/20/2012    INR 1.0 09/20/2012     Lab Results   Component Value Date    VITD 54.6 10/16/2018

## 2024-03-20 ENCOUNTER — TELEPHONE (OUTPATIENT)
Dept: INTERNAL MEDICINE CLINIC | Facility: CLINIC | Age: 74
End: 2024-03-20

## 2024-04-18 ENCOUNTER — OFFICE VISIT (OUTPATIENT)
Dept: RHEUMATOLOGY | Facility: CLINIC | Age: 74
End: 2024-04-18
Payer: MEDICARE

## 2024-04-18 VITALS
BODY MASS INDEX: 21.52 KG/M2 | HEIGHT: 61 IN | OXYGEN SATURATION: 97 % | WEIGHT: 114 LBS | RESPIRATION RATE: 16 BRPM | HEART RATE: 58 BPM

## 2024-04-18 DIAGNOSIS — M19.072 PRIMARY OSTEOARTHRITIS OF BOTH FEET: ICD-10-CM

## 2024-04-18 DIAGNOSIS — M19.071 PRIMARY OSTEOARTHRITIS OF BOTH FEET: ICD-10-CM

## 2024-04-18 DIAGNOSIS — M18.11 PRIMARY OSTEOARTHRITIS OF FIRST CARPOMETACARPAL JOINT OF RIGHT HAND: Primary | ICD-10-CM

## 2024-04-18 PROCEDURE — 3008F BODY MASS INDEX DOCD: CPT | Performed by: INTERNAL MEDICINE

## 2024-04-18 PROCEDURE — 99204 OFFICE O/P NEW MOD 45 MIN: CPT | Performed by: INTERNAL MEDICINE

## 2024-04-18 PROCEDURE — 1125F AMNT PAIN NOTED PAIN PRSNT: CPT | Performed by: INTERNAL MEDICINE

## 2024-04-18 RX ORDER — CELECOXIB 100 MG/1
100 CAPSULE ORAL DAILY PRN
Qty: 90 CAPSULE | Refills: 0 | Status: SHIPPED | OUTPATIENT
Start: 2024-04-18

## 2024-04-18 NOTE — PATIENT INSTRUCTIONS
Cont. Tylenol as needed   Can try celebrex 100mg a day as needed   Check labs every 6 months - on celebrex   Return to clinic in 12 months.   Ok to use topicals - like diclofenac gel 1 % topical

## 2024-04-18 NOTE — PROGRESS NOTES
Breanna Mccarthy is a 74 year old female who presents for   Chief Complaint   Patient presents with    Consult     Osteoarthritis of multiple joints, unspecified osteoarthritis type    Finger Pain    Toe Pain     Right   .   HPI:     I had the pleasure of seeing Breanna Mccarthy on 4/18/2024 for evaluation.     She is a pleasant 74 year old who has ongoing polyarthralgia. She was referred by NASREEN prieto for Dr. Watson.   In December she felt she had a lot of inflammation going on in her body.   She was able to see sports medicine doctor - quickly .   At that time she was having plantar fasccitis in her right foot, left sided sciatica, and right tennis elbow.   She also has osteoarthritis in both 1st toes - wrose in her right toe and both thumbs. Worse in her right thumb. Her right 2nd finger was very painful.   Gradually all these pains started from November . She was doing a lot of cleaning of her house getting ready for the holidays. Her right tennis elbow started in JAn. And her right 2nd finger was hurting more.   She then knee she had arthritis in her right 2nd finger. She was doing exercises for her right tennis elbow. She was doing a lot of pincer exercises and thingk her right thumb and 2nd finger started hurting. So she backed off the exercises and it helped.   But she started cleaning again and the thumb and index finger is hurting more.   Mostly her osteoarthritis is bothering her the most now. She did see a podiatrist in November. She was offered a right toe injeciton but declined.   She uses a topical coconut oil, fracincsese balm and tonic to lower uric acid the 1st toes are better.   She wears nothing but comfortable shoes .   Also she went to chiropracter and it helped with the left sided sciatica and right sided plantar fascitis.   She was also given exercises to do by the chiropracter.     She feels better with her left scitaica and goes to chiropracter once a month and does her  exercises.   From January to now she feels a lot better.   She did not get sick.     Except for the osteoarthritis in her toes and her 2nd finger and thumb.   No pain waking her up at night.   She is using a meta brace for the right thumb.     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   Her physical therapyist and now using a mouse and wrist brace - for ergonomics and not using her right 2nd finger as much and that's helping.     She has GERD and so she is avoiding celebrex,   She takes ES Tyelnol and that does help. She's also using diclofenac topical as well.     Her GERD is better b/c she is on carafate - and that has really helped.   She's had GERD for 6 years. PPIs dont' work for her.   Had EGD in 11/2023.     No psoriasis.           Wt Readings from Last 2 Encounters:   04/18/24 114 lb (51.7 kg)   03/14/24 109 lb (49.4 kg)     Body mass index is 21.54 kg/m².      Current Outpatient Medications   Medication Sig Dispense Refill    ALPRAZolam 0.25 MG Oral Tab Take 1 tablet (0.25 mg total) by mouth nightly as needed for Sleep. 90 tablet 0    SUCRALFATE 1 g Oral Tab TAKE 1 TABLET(1 GRAM) BY MOUTH THREE TIMES DAILY BEFORE MEALS 90 tablet 2    estradiol 0.1 MG/GM Vaginal Cream Apply intravaginally 1 g twice a week as needed 42 g 2    hydrOXYzine 25 MG Oral Tab Take 1 tab p.o. at bedtime as needed 30 tablet 0    diclofenac 1 % External Gel Use topically 4 times daily. 50 g 11    Calcium Carbonate (CALCIUM 500 OR) Take by mouth. Liquid      Vitamin D3, Cholecalciferol, 50 MCG (2000 UT) Oral Cap       Alum Hydroxide-Mag Carbonate (GAVISCON OR)       LOW-DOSE ASPIRIN OR       Melatonin 3 MG Oral Cap       Multiple Vitamins-Minerals (BIOTIN PLUS/CALCIUM/VIT D3) Oral Tab Take 1 tablet by mouth.      Emollient (COLLAGEN EX) Take by mouth daily. Collagen powder 2 tbsp       Probiotic Product (PROBIOTIC DAILY OR) Take 1 tablet by mouth daily.        Past Medical History:    Anemia    Anxiety state, unspecified     Arthritis    Esophageal reflux    H/O cystoscopy    H/O mammogram    H/O sigmoidoscopy    Hepatitis    High cholesterol    Lipid screening    per NG    Mitral valve disorder    per NG: slighty thickened mitral valve in early 's as per pt. - No treatment    Other ill-defined conditions(799.89)    per NG: \"tonsils ? \"; \"hepatitis ?\"    Panic attacks    Pneumonia    PONV (postoperative nausea and vomiting)    Pregnancy (HCC)    per NG: \"child birth, 70,81,83\"    Pulmonary emphysema (HCC)    Rheumatic fever    Ruptured ectopic pregnancy (HCC)    L salpingectomy     (spontaneous vaginal delivery) (HCC)    x3      Past Surgical History:   Procedure Laterality Date    Colonoscopy      Tubal ligation      postpartum      Family History   Problem Relation Age of Onset    Other (renal failure) Mother         renal failure    Prostate Cancer Father     Diabetes Father     Lipids Sister         hyperlipidemia    Diabetes Sister         remove this; sister is listed below    Diabetes Sister     Lipids Sister     Pancreatic Cancer Brother     Cancer Brother         pancreatic    Diabetes Paternal Grandmother     Ovarian Cancer Maternal Cousin Female 35    Ovarian Cancer Paternal Cousin Female         40's    Diabetes Other         family h/o      Social History:dad had eczema, older sister 85,   Social History     Socioeconomic History    Marital status:    Tobacco Use    Smoking status: Former     Current packs/day: 0.00     Average packs/day: 0.5 packs/day for 12.0 years (6.0 ttl pk-yrs)     Types: Cigarettes     Start date: 1968     Quit date: 1980     Years since quittin.2    Smokeless tobacco: Never    Tobacco comments:     Light smoker starting as a teenager, lasting about 14 years   Vaping Use    Vaping status: Never Used   Substance and Sexual Activity    Alcohol use: Yes     Alcohol/week: 1.0 standard drink of alcohol     Comment: rarely    Drug use: No    Sexual activity: Not Currently    Other Topics Concern    Caffeine Concern Yes     Comment: tea, 1 cup       and writer.        REVIEW OF SYSTEMS:   Review Of Systems:  Fatigue  Constitutional:No fever, no change in weight or appetitie  Derm: No rashes, no oral ulcers, no alopecia, no photosensitivity, no psoriasis  HEENT:  dry eyes,  dry mouth, no Raynaud's, no nasal ulcers, no parotid swelling, no neck pain, no jaw pain, no temple pain  Eyes: No visual changes,   CVS: No chest pain, no heart disease  RS: No SOB, no Cough, No Pleurtic pain,   GI: No nausea, no vomiiting, no abominal pain, no hx of ulcer, no gastritis, heartburn, no dyshpagia, no BRBPR or melena  : no dysuria, no hx of miscarriages, no DVT Hx, no hx of OCP,   Neuro: no numbness or tingling,, thinks she had carpal tunnel syndrome -  no headache, no hx of seizures,   Psych: no hx of anxiety or depression  ENDO: no hx of thyroid disease, no hx of DM  Joint/Muscluskeltal: see HPI,   All other ROS are negative.     EXAM:   Pulse 58   Resp 16   Ht 5' 1\" (1.549 m)   Wt 114 lb (51.7 kg)   SpO2 97%   BMI 21.54 kg/m²   HEENT: Clear oropharynx, no oral ulcers, EOM intact, clear sclear, PERRLA, pleasant, no acute distress, no CAD,   No rashes  CVS: RRR, no murmurs  RS: CTAB, no crackles, no rhonchi  ABD: Soft Non tender, no HSM felt, BS positive  Joint exam:   no neck tendnerness, good ROM,   Bilateral cmc joint of thumbs tender   Right 2nd dip mild heberdon nodes  Right  lateral and medial epicondylitis not tender.   Lower back - not as tender   Right heel not tender.   EXTREMITIES: no cyanosis, clubbing or edema  NEURO: intact touch, 5/5 ue and le strength    Component      Latest Ref Rng 12/11/2023   WBC      4.0 - 11.0 x10(3) uL 7.0    RBC      3.80 - 5.30 x10(6)uL 4.43    Hemoglobin      12.0 - 16.0 g/dL 13.8    Hematocrit      35.0 - 48.0 % 42.6    MCV      80.0 - 100.0 fL 96.2    MCH      26.0 - 34.0 pg 31.2    MCHC      31.0 - 37.0 g/dL 32.4    RDW-SD      35.1 - 46.3 fL  42.8    RDW      11.0 - 15.0 % 12.1    Platelet Count      150.0 - 450.0 10(3)uL 295.0    Prelim Neutrophil Abs      1.50 - 7.70 x10 (3) uL 4.46    Neutrophils Absolute      1.50 - 7.70 x10(3) uL 4.46    Lymphocytes Absolute      1.00 - 4.00 x10(3) uL 1.80    Monocytes Absolute      0.10 - 1.00 x10(3) uL 0.57    Eosinophils Absolute      0.00 - 0.70 x10(3) uL 0.10    Basophils Absolute      0.00 - 0.20 x10(3) uL 0.07    Immature Granulocyte Absolute      0.00 - 1.00 x10(3) uL 0.02    Neutrophils %      % 63.6    Lymphocytes %      % 25.6    Monocytes %      % 8.1    Eosinophils %      % 1.4    Basophils %      % 1.0    Immature Granulocyte %      % 0.3    Expanded VINAY Antibody Screen, IGG      <0.7 ug/l 0.30    Anti-dsDNA antibody      <10 IU/mL 1.1    Connective Tissue Disease Screen Interpretation      Negative  Negative    SED RATE      0 - 30 mm/Hr 15    C-REACTIVE PROTEIN      <1.00 mg/dL <0.40    RHEUMATOID FACTOR      <14 IU/mL <10    URIC ACID      3.1 - 7.8 mg/dL 3.8        Component      Latest Ref [facility] 1/27/2023   Glucose      70 - 99 mg/dL 94    Sodium      136 - 145 mmol/L 142    Potassium      3.5 - 5.1 mmol/L 4.0    Chloride      98 - 112 mmol/L 109    Carbon Dioxide, Total      21.0 - 32.0 mmol/L 28.0    ANION GAP      0 - 18 mmol/L 5    BUN      7 - 18 mg/dL 16    CREATININE      0.55 - 1.02 mg/dL 0.85    BUN/CREATININE RATIO      10.0 - 20.0  18.8    CALCIUM      8.5 - 10.1 mg/dL 9.5    CALCULATED OSMOLALITY      275 - 295 mOsm/kg 295    EGFR      >=60 mL/min/1.73m2 72    ALT (SGPT)      13 - 56 U/L 29    AST (SGOT)      15 - 37 U/L 25    ALKALINE PHOSPHATASE      55 - 142 U/L 76    Total Bilirubin      0.1 - 2.0 mg/dL 0.9    PROTEIN, TOTAL      6.4 - 8.2 g/dL 7.2    Albumin      3.4 - 5.0 g/dL 3.5    Globulin      2.8 - 4.4 g/dL 3.7    A/G Ratio      1.0 - 2.0  0.9 (L)    Patient Fasting for CMP? Yes       Legend:  (L) Low  ASSESSMENT AND PLAN:   Breanna Ortiz Shari is a 74 year old female who  presents for   Chief Complaint   Patient presents with    Consult     Osteoarthritis of multiple joints, unspecified osteoarthritis type    Finger Pain    Toe Pain     Right     Osteoarthritis of 1st toes. , and cmc joitns in right thumb > left thumb   S/p right 1st mtp surgery x 2 - and it helped. But the pain has come back 7 years later   - can try celebrex 100mg a day as needed   Ok to use topical like volataren gel 1 % topical    2. Right tennis elbow resolved,     3. Right plantar fascitiis - resolved   4. Left sided sciaitca - resolved.     Summary:  Cont. Tylenol as needed   Can try celebrex 100mg a day as needed   Check labs every 6 months - on celebrex   Return to clinic in 6-12 months.   Ok to use topicals - like diclofenac gel 1 % topical     Jadyn Prado MD  4/18/2024  1:42 PM

## 2024-04-23 ENCOUNTER — OFFICE VISIT (OUTPATIENT)
Dept: INTERNAL MEDICINE CLINIC | Facility: CLINIC | Age: 74
End: 2024-04-23
Payer: MEDICARE

## 2024-04-23 VITALS
WEIGHT: 109 LBS | RESPIRATION RATE: 16 BRPM | BODY MASS INDEX: 20.58 KG/M2 | SYSTOLIC BLOOD PRESSURE: 145 MMHG | HEART RATE: 59 BPM | DIASTOLIC BLOOD PRESSURE: 76 MMHG | HEIGHT: 61 IN

## 2024-04-23 DIAGNOSIS — Z00.00 MEDICARE ANNUAL WELLNESS VISIT, INITIAL: Primary | ICD-10-CM

## 2024-04-23 DIAGNOSIS — K21.00 GASTROESOPHAGEAL REFLUX DISEASE WITH ESOPHAGITIS WITHOUT HEMORRHAGE: ICD-10-CM

## 2024-04-23 DIAGNOSIS — E78.49 OTHER HYPERLIPIDEMIA: ICD-10-CM

## 2024-04-23 DIAGNOSIS — M18.11 PRIMARY OSTEOARTHRITIS OF FIRST CARPOMETACARPAL JOINT OF RIGHT HAND: ICD-10-CM

## 2024-04-23 DIAGNOSIS — M15.9 OSTEOARTHRITIS OF MULTIPLE JOINTS, UNSPECIFIED OSTEOARTHRITIS TYPE: ICD-10-CM

## 2024-04-24 ENCOUNTER — LAB ENCOUNTER (OUTPATIENT)
Dept: LAB | Age: 74
End: 2024-04-24
Attending: INTERNAL MEDICINE
Payer: MEDICARE

## 2024-04-24 DIAGNOSIS — E78.49 OTHER HYPERLIPIDEMIA: ICD-10-CM

## 2024-04-24 LAB
ALBUMIN SERPL-MCNC: 4.2 G/DL (ref 3.2–4.8)
ALBUMIN/GLOB SERPL: 1.6 {RATIO} (ref 1–2)
ALP LIVER SERPL-CCNC: 71 U/L
ALT SERPL-CCNC: 12 U/L
ANION GAP SERPL CALC-SCNC: 6 MMOL/L (ref 0–18)
AST SERPL-CCNC: 24 U/L (ref ?–34)
BILIRUB SERPL-MCNC: 1 MG/DL (ref 0.2–1.1)
BUN BLD-MCNC: 13 MG/DL (ref 9–23)
BUN/CREAT SERPL: 15.9 (ref 10–20)
CALCIUM BLD-MCNC: 9.7 MG/DL (ref 8.7–10.4)
CHLORIDE SERPL-SCNC: 109 MMOL/L (ref 98–112)
CHOLEST SERPL-MCNC: 228 MG/DL (ref ?–200)
CO2 SERPL-SCNC: 28 MMOL/L (ref 21–32)
CREAT BLD-MCNC: 0.82 MG/DL
EGFRCR SERPLBLD CKD-EPI 2021: 75 ML/MIN/1.73M2 (ref 60–?)
FASTING PATIENT LIPID ANSWER: YES
FASTING STATUS PATIENT QL REPORTED: YES
GLOBULIN PLAS-MCNC: 2.7 G/DL (ref 2.8–4.4)
GLUCOSE BLD-MCNC: 95 MG/DL (ref 70–99)
HDLC SERPL-MCNC: 79 MG/DL (ref 40–59)
LDLC SERPL CALC-MCNC: 139 MG/DL (ref ?–100)
NONHDLC SERPL-MCNC: 149 MG/DL (ref ?–130)
OSMOLALITY SERPL CALC.SUM OF ELEC: 296 MOSM/KG (ref 275–295)
POTASSIUM SERPL-SCNC: 4.2 MMOL/L (ref 3.5–5.1)
PROT SERPL-MCNC: 6.9 G/DL (ref 5.7–8.2)
SODIUM SERPL-SCNC: 143 MMOL/L (ref 136–145)
TRIGL SERPL-MCNC: 60 MG/DL (ref 30–149)
VLDLC SERPL CALC-MCNC: 11 MG/DL (ref 0–30)

## 2024-04-24 PROCEDURE — 80061 LIPID PANEL: CPT

## 2024-04-24 PROCEDURE — 80053 COMPREHEN METABOLIC PANEL: CPT

## 2024-04-24 PROCEDURE — 36415 COLL VENOUS BLD VENIPUNCTURE: CPT

## 2024-04-27 PROBLEM — E46 PROTEIN-CALORIE MALNUTRITION, UNSPECIFIED SEVERITY (HCC): Status: RESOLVED | Noted: 2023-12-11 | Resolved: 2024-04-27

## 2024-04-28 NOTE — PROGRESS NOTES
Subjective:   Breanna Mccarthy is a 74 year old female who presents for a MA (Medicare Advantage) Supervisit (Once per calendar year) and scheduled follow up of multiple significant but stable problems.     Patient reports that she has been feeling fair, bothered by recurrent intermittent pain in the joints due to arthritis.  Suffers from heartburns that the better controlled with the use of Carafate.  History/Other:   Fall Risk Assessment:   She has been screened for Falls and is low risk.      Cognitive Assessment:   She had a completely normal cognitive assessment - see flowsheet entries     Functional Ability/Status:   Breanna Mccarthy has a completely normal functional assessment. See flowsheet for details.      Depression Screening (PHQ-2/PHQ-9): PHQ-2 SCORE: 0  , done 4/23/2024      Advanced Directives:   She does NOT have a Living Will. [ ]  She does NOT have a Power of  for Health Care. [ ]        Patient Active Problem List   Diagnosis    Other hyperlipidemia    Primary osteoarthritis of first carpometacarpal joint of right hand    GERD with esophagitis    LPRD (laryngopharyngeal reflux disease)    Primary osteoarthritis of both feet    Atrophic vaginitis    Sicca syndrome (HCC)     Allergies:  She is allergic to dust mites and amoxicillin.    Current Medications:  Outpatient Medications Marked as Taking for the 4/23/24 encounter (Office Visit) with Rachel Watson MD   Medication Sig    celecoxib (CELEBREX) 100 MG Oral Cap Take 1 capsule (100 mg total) by mouth daily as needed for Pain.    ALPRAZolam 0.25 MG Oral Tab Take 1 tablet (0.25 mg total) by mouth nightly as needed for Sleep.    SUCRALFATE 1 g Oral Tab TAKE 1 TABLET(1 GRAM) BY MOUTH THREE TIMES DAILY BEFORE MEALS    estradiol 0.1 MG/GM Vaginal Cream Apply intravaginally 1 g twice a week as needed    hydrOXYzine 25 MG Oral Tab Take 1 tab p.o. at bedtime as needed    diclofenac 1 % External Gel Use topically 4 times daily.     Calcium Carbonate (CALCIUM 500 OR) Take by mouth. Liquid    Vitamin D3, Cholecalciferol, 50 MCG (2000) Oral Cap     Alum Hydroxide-Mag Carbonate (GAVISCON OR)     LOW-DOSE ASPIRIN OR     Melatonin 3 MG Oral Cap     Multiple Vitamins-Minerals (BIOTIN PLUS/CALCIUM/VIT D3) Oral Tab Take 1 tablet by mouth.    Emollient (COLLAGEN EX) Take by mouth daily. Collagen powder 2 tbsp     Probiotic Product (PROBIOTIC DAILY OR) Take 1 tablet by mouth daily.       Medical History:  She  has a past medical history of Anemia, Anxiety state, unspecified, Arthritis, Esophageal reflux, H/O cystoscopy (, ), H/O mammogram (2000), H/O sigmoidoscopy (2000), Hepatitis, High cholesterol, Lipid screening (10/26/2013), Mitral valve disorder, Other ill-defined conditions(799.89), Panic attacks, Pneumonia, PONV (postoperative nausea and vomiting), Pregnancy (), Pulmonary emphysema (), Rheumatic fever, Ruptured ectopic pregnancy (HCC) (), and  (spontaneous vaginal delivery) ().  Surgical History:  She  has a past surgical history that includes tubal ligation () and colonoscopy.   Family History:  Her family history includes Cancer in her brother; Diabetes in her father, paternal grandmother, sister, sister, and another family member; Lipids in her sister and sister; Ovarian Cancer in her paternal cousin female; Ovarian Cancer (age of onset: 35) in her maternal cousin female; Pancreatic Cancer in her brother; Prostate Cancer in her father; renal failure in her mother.  Social History:  She  reports that she quit smoking about 44 years ago. Her smoking use included cigarettes. She started smoking about 56 years ago. She has a 6 pack-year smoking history. She has never used smokeless tobacco. She reports current alcohol use of about 1.0 standard drink of alcohol per week. She reports that she does not use drugs.    Tobacco:  She smoked tobacco in the past but quit greater than 12 months ago.  Social History      Tobacco Use   Smoking Status Former    Current packs/day: 0.00    Average packs/day: 0.5 packs/day for 12.0 years (6.0 ttl pk-yrs)    Types: Cigarettes    Start date: 1968    Quit date: 1980    Years since quittin.2   Smokeless Tobacco Never   Tobacco Comments    Light smoker starting as a teenager, lasting about 14 years        CAGE Alcohol Screen:   CAGE screening score of 0 on 2024, showing low risk of alcohol abuse.      Patient Care Team:  Rachel Watson MD as PCP - General  Ramirez Law DO (OBSTETRICS & GYNECOLOGY)    Review of Systems    Constitutional:  Negative for decreased activity, fever, irritability and lethargy  Cardiovascular:  Negative for chest pain and irregular heartbeat/palpitations  Respiratory:  Negative for cough, dyspnea and wheezing.  Eyes:  Negative for eye discharge and vision loss  Endocrine:  Negative for polydipsia and polyphagia  Integumentary:  Negative for pruritus and rash  Neurological:  Negative for gait disturbance, paresthesias.   Psychiatric:  Negative for inappropriate interaction and psychiatric symptoms    /76 (BP Location: Left arm, Patient Position: Sitting, Cuff Size: adult)   Pulse 59   Resp 16   Ht 5' 1\" (1.549 m)   Wt 109 lb (49.4 kg)   BMI 20.60 kg/m²    Physical Exam  Constitutional:       Appearance: Normal appearance.   HENT:      Head: Normocephalic and atraumatic.      Right Ear: Tympanic membrane, ear canal and external ear normal.      Left Ear: Tympanic membrane, ear canal and external ear normal.   Eyes:      General: No scleral icterus.     Extraocular Movements: Extraocular movements intact.      Pupils: Pupils are equal, round, and reactive to light.   Cardiovascular:      Rate and Rhythm: Normal rate and regular rhythm.      Heart sounds: No murmur heard.  Pulmonary:      Effort: Pulmonary effort is normal.      Breath sounds: No rales.   Chest:      Chest wall: No tenderness.   Abdominal:      General: Bowel sounds  are normal.      Palpations: Abdomen is soft. There is no mass.      Tenderness: There is no guarding or rebound.   Musculoskeletal:         General: Normal range of motion.      Cervical back: Normal range of motion and neck supple.      Right lower leg: No edema.      Left lower leg: No edema.   Lymphadenopathy:      Cervical: No cervical adenopathy.   Skin:     General: Skin is warm.      Coloration: Skin is not jaundiced.   Neurological:      General: No focal deficit present.      Mental Status: She is alert and oriented to person, place, and time. Mental status is at baseline.   Psychiatric:         Mood and Affect: Mood normal.         Behavior: Behavior normal.         Thought Content: Thought content normal.           /76 (BP Location: Left arm, Patient Position: Sitting, Cuff Size: adult)   Pulse 59   Resp 16   Ht 5' 1\" (1.549 m)   Wt 109 lb (49.4 kg)   BMI 20.60 kg/m²  Estimated body mass index is 20.6 kg/m² as calculated from the following:    Height as of this encounter: 5' 1\" (1.549 m).    Weight as of this encounter: 109 lb (49.4 kg).    Medicare Hearing Assessment:   Hearing Screening    Time taken: 4/23/2024  1:22 PM  Entry User: Xin Barron MA  Screening Method: Finger Rub  Finger Rub Result: Fail         Visual Acuity:   Right Eye Visual Acuity: Corrected     Left Eye Visual Acuity: Corrected     Both Eyes Visual Acuity: Corrected Both Eyes Chart Acuity: 20/30   Able To Tolerate Visual Acuity: Yes        Assessment & Plan:   Breanna Mccarthy is a 74 year old female who presents for a Medicare Assessment.     1. Other hyperlipidemia (Primary).  Will check lipids, continue low-fat low-cholesterol diet  -     Comp Metabolic Panel (14); Future; Expected date: 04/23/2024  -     Lipid Panel; Future; Expected date: 04/23/2024    2.. Gastroesophageal reflux disease with esophagitis without hemorrhage stable, continue GERD diet, PPIs as needed Carafate  3. Primary osteoarthritis of  first carpometacarpal joint of right hand stable  4. Osteoarthritis of multiple joints, unspecified osteoarthritis type stable continue symptomatic treatment    The patient indicates understanding of these issues and agrees to the plan.  Follow-up in 1 year or sooner if needed  Follow-up in 1 year or sooner if needed    Rachel Watson MD, 4/27/2024     Supplementary Documentation:   General Health:  In the past six months, have you lost more than 10 pounds without trying?: 2 - No  Has your appetite been poor?: No  Type of Diet: Balanced  How does the patient maintain a good energy level?: Appropriate Exercise  How would you describe your daily physical activity?: Moderate  How would you describe your current health state?: Good  How do you maintain positive mental well-being?: Social Interaction;Visiting Friends;Visiting Family  On a scale of 0 to 10, with 0 being no pain and 10 being severe pain, what is your pain level?: 3 - (Mild)  In the past six months, have you experienced urine leakage?: 0-No  At any time do you feel concerned for the safety/well-being of yourself and/or your children, in your home or elsewhere?: No  Have you had any immunizations at another office such as Influenza, Hepatitis B, Tetanus, or Pneumococcal?: No       Breanna Mccarthy's SCREENING SCHEDULE   Tests on this list are recommended by your physician but may not be covered, or covered at this frequency, by your insurer.   Please check with your insurance carrier before scheduling to verify coverage.   PREVENTATIVE SERVICES FREQUENCY &  COVERAGE DETAILS LAST COMPLETION DATE   Diabetes Screening    Fasting Blood Sugar /  Glucose    One screening every 12 months if never tested or if previously tested but not diagnosed with pre-diabetes   One screening every 6 months if diagnosed with pre-diabetes Lab Results   Component Value Date    GLU 95 04/24/2024        Cardiovascular Disease Screening    Lipid Panel  Cholesterol  Lipoprotein  (HDL)  Triglycerides Covered every 5 years for all Medicare beneficiaries without apparent signs or symptoms of cardiovascular disease Lab Results   Component Value Date    CHOLEST 228 (H) 04/24/2024    HDL 79 (H) 04/24/2024     (H) 04/24/2024    TRIG 60 04/24/2024         Electrocardiogram (EKG)   Covered if needed at Welcome to Medicare, and non-screening if indicated for medical reasons 09/09/2021      Ultrasound Screening for Abdominal Aortic Aneurysm (AAA) Covered once in a lifetime for one of the following risk factors    Men who are 65-75 years old and have ever smoked    Anyone with a family history -     Colorectal Cancer Screening  Covered for ages 50-85; only need ONE of the following:    Colonoscopy   Covered every 10 years    Covered every 2 years if patient is at high risk or previous colonoscopy was abnormal 06/11/2015    Health Maintenance   Topic Date Due    Colorectal Cancer Screening  06/11/2025       Flexible Sigmoidoscopy   Covered every 4 years -    Fecal Occult Blood Test Covered annually -   Bone Density Screening    Bone density screening    Covered every 2 years after age 65 if diagnosed with risk of osteoporosis or estrogen deficiency.    Covered yearly for long-term glucocorticoid medication use (Steroids) Last Dexa Scan:    XR DEXA BONE DENSITOMETRY (CPT=77080) 02/23/2023      No recommendations at this time   Pap and Pelvic    Pap   Covered every 2 years for women at normal risk; Annually if at high risk 12/06/2021  Health Maintenance   Topic Date Due    Pap Smear  12/06/2024       Chlamydia Annually if high risk -  No recommendations at this time   Screening Mammogram    Mammogram     Recommend annually for all female patients aged 40 and older    One baseline mammogram covered for patients aged 35-39 11/17/2023    Health Maintenance   Topic Date Due    Mammogram  Discontinued       Immunizations    Influenza Covered once per flu season  Please get every year 10/26/2023  No  recommendations at this time    Pneumococcal Each vaccine ([id number] & [id number]) covered once after 65 Prevnar 13: 10/19/2015    [id number]: 10/27/2017     No recommendations at this time    Hepatitis B One screening covered for patients with certain risk factors   -  No recommendations at this time    Tetanus Toxoid Not covered by Medicare Part B unless medically necessary (cut with metal); may be covered with your pharmacy prescription benefits 08/18/2010    Tetanus, Diptheria and Pertusis TD and TDaP Not covered by Medicare Part B -  No recommendations at this time    Zoster Not covered by Medicare Part B; may be covered with your pharmacy  prescription benefits -  No recommendations at this time

## 2024-05-14 ENCOUNTER — OFFICE VISIT (OUTPATIENT)
Dept: OTOLARYNGOLOGY | Facility: CLINIC | Age: 74
End: 2024-05-14

## 2024-05-14 DIAGNOSIS — H61.23 BILATERAL IMPACTED CERUMEN: Primary | ICD-10-CM

## 2024-05-14 DIAGNOSIS — J30.9 CHRONIC ALLERGIC RHINITIS: ICD-10-CM

## 2024-05-14 PROCEDURE — 1160F RVW MEDS BY RX/DR IN RCRD: CPT | Performed by: STUDENT IN AN ORGANIZED HEALTH CARE EDUCATION/TRAINING PROGRAM

## 2024-05-14 PROCEDURE — 99213 OFFICE O/P EST LOW 20 MIN: CPT | Performed by: STUDENT IN AN ORGANIZED HEALTH CARE EDUCATION/TRAINING PROGRAM

## 2024-05-14 PROCEDURE — 1159F MED LIST DOCD IN RCRD: CPT | Performed by: STUDENT IN AN ORGANIZED HEALTH CARE EDUCATION/TRAINING PROGRAM

## 2024-05-14 PROCEDURE — 69210 REMOVE IMPACTED EAR WAX UNI: CPT | Performed by: STUDENT IN AN ORGANIZED HEALTH CARE EDUCATION/TRAINING PROGRAM

## 2024-05-14 RX ORDER — AZELASTINE 1 MG/ML
2 SPRAY, METERED NASAL 2 TIMES DAILY
Qty: 30 ML | Refills: 0 | Status: SHIPPED | OUTPATIENT
Start: 2024-05-14 | End: 2024-05-15

## 2024-05-14 NOTE — PROGRESS NOTES
Breanna Mccarthy is a 74 year old female.   Chief Complaint   Patient presents with    Ear Wax     Routine cleaning. Denies any pain.        ASSESSMENT AND PLAN:   1. Bilateral impacted cerumen  74-year-old who presents with decreased hearing in the right ear possibly due to cerumen as well as nasal congestion and some sore throat and hoarseness.  She does have a history of reflux for which she uses sucralfate.    On exam she had a bilateral cerumen impaction worse on the right.  This was debrided with an improvement in her hearing.    Discussed with her conservative measures for reflux and allergies in the setting of her hoarseness and nasal congestion.  Suggest that she try Astelin nasal spray she would like to try something without a steroid I will send her prescription in case this might be covered by her insurance.  She can see me back as needed.    2. Chronic allergic rhinitis        The patient indicates understanding of these issues and agrees to the plan.      EXAM:   There were no vitals taken for this visit.    Pertinent exam findings may also be noted above in assessment and plan     System Details   Skin Inspection - Normal.   Constitutional Overall appearance - Normal.   Head/Face Symmetric, TMJ tenderness not present    Eyes EOMI, PERRL   Right ear:  Canal clear, TM intact, no FROY   Left ear:  Canal clear, TM intact, no FROY   Nose: Septum midline, inferior turbinates not enlarged, nasal valves without collapse    Oral cavity/Oropharynx: No lesions or masses on inspection or palpation, tonsils symmetric    Neck: Soft without LAD, thyroid not enlarged  Voice clear/ no stridor   Other:      Scopes and Procedures:     Canals:  Left: Canal with cerumen preventing adequate view of TM, debrided with instrumentation  Right: Canal with cerumen preventing adequate view of TM, debrided with instrumentation    Tympanic Membranes:  Left: Normal tympanic membrane.   Right: Normal tympanic membrane.     TM  Visualized Method:   Left TM examined via otomicroscopy.    Right TM examined via otomicroscopy.      PROCEDURE:   Removal of cerumen impaction   The cerumen impaction was completely removed on the left and right sides using microscopy as necessary.   Removal was completed by using a curette and suction.         Current Outpatient Medications   Medication Sig Dispense Refill    azelastine 0.1 % Nasal Solution 2 sprays by Nasal route 2 (two) times daily. 30 mL 0    celecoxib (CELEBREX) 100 MG Oral Cap Take 1 capsule (100 mg total) by mouth daily as needed for Pain. 90 capsule 0    ALPRAZolam 0.25 MG Oral Tab Take 1 tablet (0.25 mg total) by mouth nightly as needed for Sleep. 90 tablet 0    SUCRALFATE 1 g Oral Tab TAKE 1 TABLET(1 GRAM) BY MOUTH THREE TIMES DAILY BEFORE MEALS 90 tablet 2    estradiol 0.1 MG/GM Vaginal Cream Apply intravaginally 1 g twice a week as needed 42 g 2    hydrOXYzine 25 MG Oral Tab Take 1 tab p.o. at bedtime as needed 30 tablet 0    diclofenac 1 % External Gel Use topically 4 times daily. 50 g 11    Calcium Carbonate (CALCIUM 500 OR) Take by mouth. Liquid      Vitamin D3, Cholecalciferol, 50 MCG (2000 UT) Oral Cap       Alum Hydroxide-Mag Carbonate (GAVISCON OR)       LOW-DOSE ASPIRIN OR       Melatonin 3 MG Oral Cap       Multiple Vitamins-Minerals (BIOTIN PLUS/CALCIUM/VIT D3) Oral Tab Take 1 tablet by mouth.      Emollient (COLLAGEN EX) Take by mouth daily. Collagen powder 2 tbsp       Probiotic Product (PROBIOTIC DAILY OR) Take 1 tablet by mouth daily.        Past Medical History:    Anemia    Anxiety state, unspecified    Arthritis    Esophageal reflux    H/O cystoscopy    H/O mammogram    H/O sigmoidoscopy    Hepatitis    High cholesterol    Lipid screening    per NG    Mitral valve disorder    per NG: slighty thickened mitral valve in early 90's as per pt. - No treatment    Other ill-defined conditions(799.89)    per NG: \"tonsils ? 1994\"; \"hepatitis ?\"    Panic attacks    Pneumonia     PONV (postoperative nausea and vomiting)    Pregnancy (HCC)    per NG: \"child birth, 70,81,83\"    Pulmonary emphysema (HCC)    Rheumatic fever    Ruptured ectopic pregnancy (HCC)    L salpingectomy     (spontaneous vaginal delivery) ([facility])    x3      Social History:  Social History     Socioeconomic History    Marital status:    Tobacco Use    Smoking status: Former     Current packs/day: 0.00     Average packs/day: 0.5 packs/day for 12.0 years (6.0 ttl pk-yrs)     Types: Cigarettes     Start date: 1968     Quit date: 1980     Years since quittin.2     Passive exposure: Never    Smokeless tobacco: Never    Tobacco comments:     Light smoker starting as a teenager, lasting about 14 years   Vaping Use    Vaping status: Never Used   Substance and Sexual Activity    Alcohol use: Yes     Alcohol/week: 1.0 standard drink of alcohol     Comment: rarely    Drug use: No    Sexual activity: Not Currently   Other Topics Concern    Caffeine Concern Yes     Comment: tea, 1 cup          Alex Kebede MD  2024  3:11 PM

## 2024-05-15 RX ORDER — AZELASTINE 1 MG/ML
2 SPRAY, METERED NASAL 2 TIMES DAILY
Qty: 90 ML | Refills: 0 | Status: SHIPPED | OUTPATIENT
Start: 2024-05-15

## 2024-08-07 ENCOUNTER — OFFICE VISIT (OUTPATIENT)
Dept: OTOLARYNGOLOGY | Facility: CLINIC | Age: 74
End: 2024-08-07
Payer: MEDICARE

## 2024-08-07 DIAGNOSIS — R09.A2 GLOBUS SENSATION: ICD-10-CM

## 2024-08-07 DIAGNOSIS — K21.00 GASTROESOPHAGEAL REFLUX DISEASE WITH ESOPHAGITIS, UNSPECIFIED WHETHER HEMORRHAGE: Primary | ICD-10-CM

## 2024-08-07 PROCEDURE — 1160F RVW MEDS BY RX/DR IN RCRD: CPT | Performed by: STUDENT IN AN ORGANIZED HEALTH CARE EDUCATION/TRAINING PROGRAM

## 2024-08-07 PROCEDURE — 1159F MED LIST DOCD IN RCRD: CPT | Performed by: STUDENT IN AN ORGANIZED HEALTH CARE EDUCATION/TRAINING PROGRAM

## 2024-08-07 PROCEDURE — 99213 OFFICE O/P EST LOW 20 MIN: CPT | Performed by: STUDENT IN AN ORGANIZED HEALTH CARE EDUCATION/TRAINING PROGRAM

## 2024-08-07 PROCEDURE — 31575 DIAGNOSTIC LARYNGOSCOPY: CPT | Performed by: STUDENT IN AN ORGANIZED HEALTH CARE EDUCATION/TRAINING PROGRAM

## 2024-08-07 NOTE — PROGRESS NOTES
Breanna Mccarthy is a 74 year old female.   Chief Complaint   Patient presents with    Throat Problem     Patient is here for sore throat reports felling bubbles on her throat and hoarseness with dry cough x 4 months patient reports getting worst couple of weeks.      HPI:   74-year-old here for throat evaluation.  Strong history of reflux which may be acting up this is laryngopharyngeal reflux.  Worse in her sleep and when waking up in the morning.  Uses Carafate and Gaviscon occasionally.    Current Outpatient Medications   Medication Sig Dispense Refill    azelastine 0.1 % Nasal Solution USE 2 SPRAYS IN EACH NOSTRIL TWICE DAILY 90 mL 0    celecoxib (CELEBREX) 100 MG Oral Cap Take 1 capsule (100 mg total) by mouth daily as needed for Pain. 90 capsule 0    ALPRAZolam 0.25 MG Oral Tab Take 1 tablet (0.25 mg total) by mouth nightly as needed for Sleep. 90 tablet 0    SUCRALFATE 1 g Oral Tab TAKE 1 TABLET(1 GRAM) BY MOUTH THREE TIMES DAILY BEFORE MEALS 90 tablet 2    estradiol 0.1 MG/GM Vaginal Cream Apply intravaginally 1 g twice a week as needed 42 g 2    hydrOXYzine 25 MG Oral Tab Take 1 tab p.o. at bedtime as needed 30 tablet 0    diclofenac 1 % External Gel Use topically 4 times daily. 50 g 11    Calcium Carbonate (CALCIUM 500 OR) Take by mouth. Liquid      Vitamin D3, Cholecalciferol, 50 MCG (2000 UT) Oral Cap       Alum Hydroxide-Mag Carbonate (GAVISCON OR)       LOW-DOSE ASPIRIN OR       Melatonin 3 MG Oral Cap       Multiple Vitamins-Minerals (BIOTIN PLUS/CALCIUM/VIT D3) Oral Tab Take 1 tablet by mouth.      Emollient (COLLAGEN EX) Take by mouth daily. Collagen powder 2 tbsp       Probiotic Product (PROBIOTIC DAILY OR) Take 1 tablet by mouth daily.        Past Medical History:    Anemia    Anxiety state, unspecified    Arthritis    Esophageal reflux    H/O cystoscopy    H/O mammogram    H/O sigmoidoscopy    Hepatitis    High cholesterol    Lipid screening    per NG    Mitral valve disorder    per NG:  slighty thickened mitral valve in early  as per pt. - No treatment    Other ill-defined conditions(799.89)    per NG: \"tonsils ? \"; \"hepatitis ?\"    Panic attacks    Pneumonia    PONV (postoperative nausea and vomiting)    Pregnancy (HCC)    per NG: \"child birth, 70,81,83\"    Pulmonary emphysema (HCC)    Rheumatic fever    Ruptured ectopic pregnancy (HCC)    L salpingectomy     (spontaneous vaginal delivery) (HCC)    x3      Social History:  Social History     Socioeconomic History    Marital status:    Tobacco Use    Smoking status: Former     Current packs/day: 0.00     Average packs/day: 0.5 packs/day for 12.0 years (6.0 ttl pk-yrs)     Types: Cigarettes     Start date: 1968     Quit date: 1980     Years since quittin.5     Passive exposure: Never    Smokeless tobacco: Never    Tobacco comments:     Light smoker starting as a teenager, lasting about 14 years   Vaping Use    Vaping status: Never Used   Substance and Sexual Activity    Alcohol use: Yes     Alcohol/week: 1.0 standard drink of alcohol     Comment: rarely    Drug use: No    Sexual activity: Not Currently   Other Topics Concern    Caffeine Concern Yes     Comment: tea, 1 cup      Past Surgical History:   Procedure Laterality Date    Colonoscopy      Tubal ligation      postpartum         EXAM:   There were no vitals taken for this visit.    System Details   Skin Inspection - Normal.   Constitutional Overall appearance - Normal.   Head/Face Symmetric, TMJ tenderness not present    Eyes EOMI, PERRL   Right ear:  Canal clear, TM intact, no FROY   Left ear:  Canal clear, TM intact, no FROY   Nose: Septum midline, inferior turbinates not enlarged, nasal valves without collapse    Oral cavity/Oropharynx: No lesions or masses on inspection or palpation, tonsils symmetric    Neck: Soft without LAD, thyroid not enlarged  Voice clear/ no stridor   Other:      SCOPES AND PROCEDURES:       Flexible Laryngoscopy Procedure Note  (55039)    Due to inability for adequate examination of the larynx and need for magnification to perform the examination, endoscopy was performed.  Risks and benefits were discussed with patient/family and they have given verbal consent to proceed.    Pre-operative Diagnosis:   1. Gastroesophageal reflux disease with esophagitis, unspecified whether hemorrhage    2. Globus sensation        Post-operative Diagnosis: Same    Procedure: Diagnostic flexible fiberoptic laryngoscopy    Anesthesia: Topical anesthetic [place]     Surgeon Alex Kebede MD    EBL: 0cc    Procedure Detail & Findings:     After placement of topical anesthetic intranasally the flexible laryngoscope was inserted into the nare and driven through the nasal cavity with no significant abnormal findings noted in the nasal cavities or nasopharynx. The oropharynx, hypopharynx and larynx were subsequently examined and the following findings were noted:        Base of Tongue: Normal        Valeculla: Normal        Arytenoids: Normal/Erythemous: Erythmous        Introitus of the esophagus: Normal        Epiglottis: Normal        False vocal cords: Normal        True vocal cords: Normal        Subglottic space: Normal        Mobility of True vocal cords: Normal    Condition: Stable    Complications: Patient tolerated the procedure well with no immediate complication.    Alex Kebede MD    AUDIOGRAM AND IMAGING:         IMPRESSION:   1. Gastroesophageal reflux disease with esophagitis, unspecified whether hemorrhage    2. Globus sensation       Recommendations:  -Laryngoscopy with erythema of the arytenoids otherwise no abnormal findings.  Likely a globus sensation in the setting of laryngopharyngeal reflux which is flaring up  -Discussed diet and medication measures which she is well versed in from her longstanding history of this issue but will work to get this better under control    The patient indicates understanding of these issues and agrees to the  plan.      Alex Kebede MD  8/7/2024  12:41 PM

## 2024-08-23 ENCOUNTER — OFFICE VISIT (OUTPATIENT)
Dept: INTERNAL MEDICINE CLINIC | Facility: CLINIC | Age: 74
End: 2024-08-23

## 2024-08-23 VITALS
DIASTOLIC BLOOD PRESSURE: 72 MMHG | WEIGHT: 112.88 LBS | BODY MASS INDEX: 21.31 KG/M2 | HEART RATE: 60 BPM | SYSTOLIC BLOOD PRESSURE: 144 MMHG | HEIGHT: 61 IN | TEMPERATURE: 98 F

## 2024-08-23 DIAGNOSIS — J02.0 STREP THROAT: Primary | ICD-10-CM

## 2024-08-23 DIAGNOSIS — J02.9 SORE THROAT: ICD-10-CM

## 2024-08-23 LAB
CONTROL LINE PRESENT WITH A CLEAR BACKGROUND (YES/NO): YES YES/NO
KIT LOT #: NORMAL NUMERIC
STREP GRP A CUL-SCR: POSITIVE

## 2024-08-23 PROCEDURE — 1160F RVW MEDS BY RX/DR IN RCRD: CPT | Performed by: INTERNAL MEDICINE

## 2024-08-23 PROCEDURE — 87880 STREP A ASSAY W/OPTIC: CPT | Performed by: INTERNAL MEDICINE

## 2024-08-23 PROCEDURE — 3008F BODY MASS INDEX DOCD: CPT | Performed by: INTERNAL MEDICINE

## 2024-08-23 PROCEDURE — 1125F AMNT PAIN NOTED PAIN PRSNT: CPT | Performed by: INTERNAL MEDICINE

## 2024-08-23 PROCEDURE — 1159F MED LIST DOCD IN RCRD: CPT | Performed by: INTERNAL MEDICINE

## 2024-08-23 PROCEDURE — 99213 OFFICE O/P EST LOW 20 MIN: CPT | Performed by: INTERNAL MEDICINE

## 2024-08-23 PROCEDURE — 3077F SYST BP >= 140 MM HG: CPT | Performed by: INTERNAL MEDICINE

## 2024-08-23 PROCEDURE — 3078F DIAST BP <80 MM HG: CPT | Performed by: INTERNAL MEDICINE

## 2024-08-23 RX ORDER — AZITHROMYCIN 250 MG/1
TABLET, FILM COATED ORAL
Qty: 6 TABLET | Refills: 0 | Status: SHIPPED | OUTPATIENT
Start: 2024-08-23 | End: 2024-08-27

## 2024-08-23 NOTE — PROGRESS NOTES
Breanna Mccarthy is a 74 year old female.  Chief Complaint   Patient presents with    Sore Throat     HPI:    Patient presented today for throat pain.   She states that she always has some throat discomfort and was seen by ENT on 8/7. Has started taking her carafate. Since last two days the throat pain is worst along with some dry cough. It was her husbands wake few days ago, and meeting a lot of people and talking more.   No fever, no headaches, no ear pain      Current Outpatient Medications   Medication Sig Dispense Refill    azithromycin 250 MG Oral Tab Take 2 tablets (500 mg total) by mouth daily for 1 day, THEN 1 tablet (250 mg total) daily for 4 days. 6 tablet 0    azelastine 0.1 % Nasal Solution USE 2 SPRAYS IN EACH NOSTRIL TWICE DAILY 90 mL 0    celecoxib (CELEBREX) 100 MG Oral Cap Take 1 capsule (100 mg total) by mouth daily as needed for Pain. 90 capsule 0    ALPRAZolam 0.25 MG Oral Tab Take 1 tablet (0.25 mg total) by mouth nightly as needed for Sleep. 90 tablet 0    SUCRALFATE 1 g Oral Tab TAKE 1 TABLET(1 GRAM) BY MOUTH THREE TIMES DAILY BEFORE MEALS 90 tablet 2    estradiol 0.1 MG/GM Vaginal Cream Apply intravaginally 1 g twice a week as needed 42 g 2    hydrOXYzine 25 MG Oral Tab Take 1 tab p.o. at bedtime as needed 30 tablet 0    diclofenac 1 % External Gel Use topically 4 times daily. 50 g 11    Calcium Carbonate (CALCIUM 500 OR) Take by mouth. Liquid      Vitamin D3, Cholecalciferol, 50 MCG (2000 UT) Oral Cap       Alum Hydroxide-Mag Carbonate (GAVISCON OR)       LOW-DOSE ASPIRIN OR       Melatonin 3 MG Oral Cap       Multiple Vitamins-Minerals (BIOTIN PLUS/CALCIUM/VIT D3) Oral Tab Take 1 tablet by mouth.      Emollient (COLLAGEN EX) Take by mouth daily. Collagen powder 2 tbsp       Probiotic Product (PROBIOTIC DAILY OR) Take 1 tablet by mouth daily.        Past Medical History:    Anemia    Anxiety state, unspecified    Arthritis    Esophageal reflux    H/O cystoscopy    H/O mammogram    H/O  sigmoidoscopy    Hepatitis    High cholesterol    Lipid screening    per NG    Mitral valve disorder    per NG: slighty thickened mitral valve in early  as per pt. - No treatment    Other ill-defined conditions(799.89)    per NG: \"tonsils ? \"; \"hepatitis ?\"    Panic attacks    Pneumonia    PONV (postoperative nausea and vomiting)    Pregnancy (HCC)    per NG: \"child birth, 70,81,83\"    Pulmonary emphysema (HCC)    Rheumatic fever    Ruptured ectopic pregnancy (HCC)    L salpingectomy     (spontaneous vaginal delivery) (HCC)    x3      Past Surgical History:   Procedure Laterality Date    Colonoscopy      Tubal ligation      postpartum      Social History:  Social History     Socioeconomic History    Marital status:    Tobacco Use    Smoking status: Former     Current packs/day: 0.00     Average packs/day: 0.5 packs/day for 12.0 years (6.0 ttl pk-yrs)     Types: Cigarettes     Start date: 1968     Quit date: 1980     Years since quittin.5     Passive exposure: Never    Smokeless tobacco: Never    Tobacco comments:     Light smoker starting as a teenager, lasting about 14 years   Vaping Use    Vaping status: Never Used   Substance and Sexual Activity    Alcohol use: Yes     Alcohol/week: 1.0 standard drink of alcohol     Comment: rarely    Drug use: No    Sexual activity: Not Currently   Other Topics Concern    Caffeine Concern Yes     Comment: tea, 1 cup      Family History   Problem Relation Age of Onset    Other (renal failure) Mother         renal failure    Prostate Cancer Father     Diabetes Father     Lipids Sister         hyperlipidemia    Diabetes Sister         remove this; sister is listed below    Diabetes Sister     Lipids Sister     Pancreatic Cancer Brother     Cancer Brother         pancreatic    Diabetes Paternal Grandmother     Ovarian Cancer Maternal Cousin Female 35    Ovarian Cancer Paternal Cousin Female         40's    Diabetes Other         family h/o       Allergies   Allergen Reactions    Dust Mites Coughing, Runny nose and Tightness in Throat    Amoxicillin RASH        REVIEW OF SYSTEMS:   Review of Systems   Review of Systems   Constitutional: Negative for activity change, appetite change and fever.   HENT: Negative for congestion and voice change.    Respiratory: Negative for cough and shortness of breath.    Cardiovascular: Negative for chest pain.   Gastrointestinal: Negative for abdominal distention, abdominal pain and vomiting.   Genitourinary: Negative for hematuria.   Skin: Negative for wound.   Psychiatric/Behavioral: Negative for behavioral problems.   Wt Readings from Last 5 Encounters:   08/23/24 112 lb 14.4 oz (51.2 kg)   04/23/24 109 lb (49.4 kg)   04/18/24 114 lb (51.7 kg)   03/14/24 109 lb (49.4 kg)   01/03/24 109 lb (49.4 kg)     Body mass index is 21.33 kg/m².      EXAM:   /72 (BP Location: Left arm, Patient Position: Sitting, Cuff Size: adult)   Pulse 60   Temp 98.4 °F (36.9 °C) (Temporal)   Ht 5' 1\" (1.549 m)   Wt 112 lb 14.4 oz (51.2 kg)   BMI 21.33 kg/m²   Physical Exam   Constitutional:       Appearance: Normal appearance.   HENT:      Head: Normocephalic.       Pharyngeal erythema  Eyes:      Conjunctiva/sclera: Conjunctivae normal.   Cardiovascular:      Rate and Rhythm: Normal rate and regular rhythm.      Heart sounds: Normal heart sounds. No murmur heard.  Pulmonary:      Effort: Pulmonary effort is normal.      Breath sounds: Normal breath sounds. No rhonchi or rales.   Abdominal:      General: Bowel sounds are normal.      Palpations: Abdomen is soft.      Tenderness: There is no abdominal tenderness.   Musculoskeletal:      Cervical back: Neck supple.      Right lower leg: No edema.      Left lower leg: No edema.   Skin:     General: Skin is warm and dry.   Neurological:      General: No focal deficit present.      Mental Status: He is alert and oriented to person, place, and time. Mental status is at baseline.    Psychiatric:         Mood and Affect: Mood normal.         Behavior: Behavior normal.       ASSESSMENT AND PLAN:   1. Strep throat  2. Sore throat  - positive for strep  - will give Z pack since she is allergic to amoxicillin  - tylenol as needed for pain   - increase hydration/rest  - Strep A Assay W/Optic    The patient indicates understanding of these issues and agrees to the plan.      Michelle Coleman MD

## 2024-08-28 ENCOUNTER — OFFICE VISIT (OUTPATIENT)
Dept: INTERNAL MEDICINE CLINIC | Facility: CLINIC | Age: 74
End: 2024-08-28
Payer: MEDICARE

## 2024-08-28 ENCOUNTER — HOSPITAL ENCOUNTER (OUTPATIENT)
Dept: GENERAL RADIOLOGY | Age: 74
Discharge: HOME | End: 2024-08-28
Attending: INTERNAL MEDICINE
Payer: MEDICARE

## 2024-08-28 ENCOUNTER — TELEPHONE (OUTPATIENT)
Dept: INTERNAL MEDICINE CLINIC | Facility: CLINIC | Age: 74
End: 2024-08-28

## 2024-08-28 VITALS
BODY MASS INDEX: 21.27 KG/M2 | WEIGHT: 112.63 LBS | HEART RATE: 73 BPM | TEMPERATURE: 98 F | SYSTOLIC BLOOD PRESSURE: 126 MMHG | DIASTOLIC BLOOD PRESSURE: 68 MMHG | HEIGHT: 61 IN

## 2024-08-28 DIAGNOSIS — J06.9 ACUTE URI: Primary | ICD-10-CM

## 2024-08-28 DIAGNOSIS — J06.9 ACUTE URI: ICD-10-CM

## 2024-08-28 LAB
CONTROL LINE PRESENT WITH A CLEAR BACKGROUND (YES/NO): YES YES/NO
KIT LOT #: NORMAL NUMERIC
STREP GRP A CUL-SCR: NEGATIVE

## 2024-08-28 PROCEDURE — 1160F RVW MEDS BY RX/DR IN RCRD: CPT | Performed by: INTERNAL MEDICINE

## 2024-08-28 PROCEDURE — 99213 OFFICE O/P EST LOW 20 MIN: CPT | Performed by: INTERNAL MEDICINE

## 2024-08-28 PROCEDURE — 3008F BODY MASS INDEX DOCD: CPT | Performed by: INTERNAL MEDICINE

## 2024-08-28 PROCEDURE — 3074F SYST BP LT 130 MM HG: CPT | Performed by: INTERNAL MEDICINE

## 2024-08-28 PROCEDURE — 1159F MED LIST DOCD IN RCRD: CPT | Performed by: INTERNAL MEDICINE

## 2024-08-28 PROCEDURE — 1125F AMNT PAIN NOTED PAIN PRSNT: CPT | Performed by: INTERNAL MEDICINE

## 2024-08-28 PROCEDURE — 3078F DIAST BP <80 MM HG: CPT | Performed by: INTERNAL MEDICINE

## 2024-08-28 PROCEDURE — 71046 X-RAY EXAM CHEST 2 VIEWS: CPT | Performed by: INTERNAL MEDICINE

## 2024-08-28 PROCEDURE — 87880 STREP A ASSAY W/OPTIC: CPT | Performed by: INTERNAL MEDICINE

## 2024-08-28 NOTE — TELEPHONE ENCOUNTER
Patient called back for status update of this.  Scheduled her with Dr. Mares today at 230pm. Appointment with Dr. Coleman no longer needed.

## 2024-08-28 NOTE — TELEPHONE ENCOUNTER
Future Appointments   Date Time Provider Department Center   8/28/2024  2:30 PM Benton Mares MD [facility] [place]   11/7/2024 11:00 AM MacDuff, Roger A, DO [facility] [place]

## 2024-08-28 NOTE — PATIENT INSTRUCTIONS
Your Strep test today was negative  Await results of chest x-ray  See Dr. Kebede if symptoms persist

## 2024-08-28 NOTE — TELEPHONE ENCOUNTER
Patient calling ( name and date of birth of patient verified )   Last office visit 24  dx with Streph throat       Patient reports completed Z pack but remains with a sore throat, nasal stuffiness, raspy voice    Able to eat, drink and swallow but with discomfort     Doing hot tea and honey, gargling with slight relief     Denies fever : took a Covid  test  at home was Negative      ( Has been talking a lot since her   suddenly 10 days  ago)     Asking what is next step     Please advise and thank you.

## 2024-08-28 NOTE — PROGRESS NOTES
Breanna Mccarthy is a 74 year old female.   Chief Complaint   Patient presents with    Strep Throat     F/u patient c/o sore throat after taking z-molly     HPI:   For approximately 2 months she has had a persistent intermittent sore throat, worse when swallowing, along with a \"tickle\" in her throat and a nonproductive cough.  She saw Dr. Kebede of ENT on August 7 at which time laryngoscopy was normal except for erythema of the arytenoids.  Throat pain acutely worsened and she saw a different provider on August 23.  Strep testing was positive at that time and she was prescribed a 5-day course of Zithromax.    She presents today for follow-up.  Throat pain is really no better after her course of Zithromax.  No fever.  No nasal congestion, purulent nasal drainage, sinus pressure or pain, ear pain or shortness of breath.  She did a home COVID test yesterday which was negative.  She would like to have the Strep test repeated.    Medications reviewed, as listed below.  Allergic to penicillin.  Non-smoker.  Current Outpatient Medications   Medication Sig Dispense Refill    azelastine 0.1 % Nasal Solution USE 2 SPRAYS IN EACH NOSTRIL TWICE DAILY 90 mL 0    ALPRAZolam 0.25 MG Oral Tab Take 1 tablet (0.25 mg total) by mouth nightly as needed for Sleep. 90 tablet 0    SUCRALFATE 1 g Oral Tab TAKE 1 TABLET(1 GRAM) BY MOUTH THREE TIMES DAILY BEFORE MEALS 90 tablet 2    estradiol 0.1 MG/GM Vaginal Cream Apply intravaginally 1 g twice a week as needed 42 g 2    hydrOXYzine 25 MG Oral Tab Take 1 tab p.o. at bedtime as needed 30 tablet 0    diclofenac 1 % External Gel Use topically 4 times daily. 50 g 11    Calcium Carbonate (CALCIUM 500 OR) Take by mouth. Liquid      Vitamin D3, Cholecalciferol, 50 MCG (2000 UT) Oral Cap       Alum Hydroxide-Mag Carbonate (GAVISCON OR)       LOW-DOSE ASPIRIN OR       Melatonin 3 MG Oral Cap       Multiple Vitamins-Minerals (BIOTIN PLUS/CALCIUM/VIT D3) Oral Tab Take 1 tablet by mouth.       Emollient (COLLAGEN EX) Take by mouth daily. Collagen powder 2 tbsp       Probiotic Product (PROBIOTIC DAILY OR) Take 1 tablet by mouth daily.      celecoxib (CELEBREX) 100 MG Oral Cap Take 1 capsule (100 mg total) by mouth daily as needed for Pain. (Patient not taking: Reported on 2024) 90 capsule 0     Allergies   Allergen Reactions    Dust Mites Coughing, Runny nose and Tightness in Throat    Amoxicillin RASH      Past Medical History:    Anemia    Anxiety state, unspecified    Arthritis    Esophageal reflux    H/O cystoscopy    H/O mammogram    H/O sigmoidoscopy    Hepatitis    High cholesterol    Lipid screening    per NG    Mitral valve disorder    per NG: slighty thickened mitral valve in early  as per pt. - No treatment    Other ill-defined conditions(799.89)    per NG: \"tonsils ? \"; \"hepatitis ?\"    Panic attacks    Pneumonia    PONV (postoperative nausea and vomiting)    Pregnancy (HCC)    per NG: \"child birth, 70,81,83\"    Pulmonary emphysema (HCC)    Rheumatic fever    Ruptured ectopic pregnancy (HCC)    L salpingectomy     (spontaneous vaginal delivery) (HCC)    x3      Social History:  Social History     Socioeconomic History    Marital status:    Tobacco Use    Smoking status: Former     Current packs/day: 0.00     Average packs/day: 0.5 packs/day for 12.0 years (6.0 ttl pk-yrs)     Types: Cigarettes     Start date: 1968     Quit date: 1980     Years since quittin.5     Passive exposure: Never    Smokeless tobacco: Never    Tobacco comments:     Light smoker starting as a teenager, lasting about 14 years   Vaping Use    Vaping status: Never Used   Substance and Sexual Activity    Alcohol use: Yes     Alcohol/week: 1.0 standard drink of alcohol     Comment: rarely    Drug use: No    Sexual activity: Not Currently   Other Topics Concern    Caffeine Concern Yes     Comment: tea, 1 cup        EXAM:   GENERAL: Pleasant female appearing well and breathing comfortably in  no distress  /68   Pulse 73   Temp 97.6 °F (36.4 °C) (Temporal)   Ht 5' 1\" (1.549 m)   Wt 112 lb 9.6 oz (51.1 kg)   BMI 21.28 kg/m²   HEENT: Anicteric, conjunctiva pink, canals and TMs normal bilaterally, no maxillary or frontal sinus tenderness, oropharynx normal without erythema ulceration swelling or exudate  NECK: Supple without mass or lymphadenopathy  LUNGS: Resonant to percussion and clear to auscultation without crackles or wheezes    Rapid Strep negative    ASSESSMENT AND PLAN:   1. Acute URI  Subacute symptoms with recent Strep test positive, with persistent symptoms despite course of Zithromax.  Strep testing today negative  Chest x-ray today given cough.  Order sent  Close observation.  Ongoing follow-up with ENT if needed  - POC Rapid Strep [75014]    The patient indicates understanding of these issues and agrees to the plan.    Benton Mares MD  8/28/2024  2:46 PM

## 2024-08-29 DIAGNOSIS — F41.9 ANXIETY: ICD-10-CM

## 2024-08-30 RX ORDER — ALPRAZOLAM 0.25 MG
0.25 TABLET ORAL NIGHTLY PRN
Qty: 90 TABLET | Refills: 0 | Status: SHIPPED | OUTPATIENT
Start: 2024-08-30

## 2024-08-30 NOTE — TELEPHONE ENCOUNTER
Please review. Protocol Failed; No Protocol        Recent fills: 3/21/2024, 5/22/2024, 6/14/2024  Last Rx written: 3/20/2024  Last office visit: 8/23/2024        Requested Prescriptions   Pending Prescriptions Disp Refills    ALPRAZolam 0.25 MG Oral Tab 90 tablet 0     Sig: Take 1 tablet (0.25 mg total) by mouth nightly as needed for Sleep.       Controlled Substance Medication Failed - 8/29/2024 11:59 AM        Failed - This medication is a controlled substance - forward to provider to refill               Future Appointments         Provider Department Appt Notes    In 2 months Ramirez Law, DO [facility], [address], [place] - OB/GYN Overall gynecological health.   last wc 10/24/23          Recent Outpatient Visits              2 days ago Acute URI    [facility], [place] MD    Office Visit    1 week ago Strep throat    [facility], [address], [place] Michelle Coleman MD    Office Visit    3 weeks ago Gastroesophageal reflux disease with esophagitis, unspecified whether hemorrhage    [facility], Alex Linn MD    Office Visit    3 months ago [facility]ilateral impacted cerumen    [facility]    [facility]    4 months ago Medicare annual wellness visit, initial    [facility], [address], [place] Rachel Watson MD    Office Visit

## 2024-09-05 NOTE — ED INITIAL ASSESSMENT (HPI)
Sent over by PMDs office for evaluation of chest pain starting yesterday. Denies SOB, n/v or diaphoresis. Denies exacerbating or relieving factors.  Pt states this pain has been intermittent for the last few months but yesterday it was the worst it had ever
4-5

## 2024-11-20 ENCOUNTER — HOSPITAL ENCOUNTER (OUTPATIENT)
Dept: MAMMOGRAPHY | Age: 74
Discharge: HOME | End: 2024-11-20
Attending: INTERNAL MEDICINE
Payer: MEDICARE

## 2024-11-20 DIAGNOSIS — Z12.31 ENCOUNTER FOR SCREENING MAMMOGRAM FOR BREAST CANCER: ICD-10-CM

## 2024-11-20 PROCEDURE — 77067 SCR MAMMO BI INCL CAD: CPT | Performed by: INTERNAL MEDICINE

## 2024-11-20 PROCEDURE — 77063 BREAST TOMOSYNTHESIS BI: CPT | Performed by: INTERNAL MEDICINE

## 2025-01-03 DIAGNOSIS — F41.9 ANXIETY: ICD-10-CM

## 2025-01-08 RX ORDER — ALPRAZOLAM 0.25 MG/1
0.25 TABLET ORAL NIGHTLY PRN
Qty: 90 TABLET | Refills: 0 | Status: SHIPPED | OUTPATIENT
Start: 2025-01-08

## 2025-01-08 NOTE — TELEPHONE ENCOUNTER
Please review; protocol failed/ has no protocol      Recent fills: 11/20/2024,08/31/2024  Last Rx written: 08/30/2024  Last Office Visit: 08/23/2024    Recent Visits  Date Type Provider Dept   08/23/24 Office Visit Michelle Coleman MD [facility]-Internal Med2         Requested Prescriptions   Pending Prescriptions Disp Refills    ALPRAZolam 0.25 MG Oral Tab 90 tablet 0     Sig: Take 1 tablet (0.25 mg total) by mouth nightly as needed for Sleep.       Controlled Substance Medication Failed - 1/8/2025  2:40 PM        Failed - This medication is a controlled substance - forward to provider to refill        Passed - Medication is active on med list           Recent Outpatient Visits              4 months ago Acute URI    [facility] Blanco MD    Office Visit    4 months ago Strep throat    [facility], [address], [place] Michelle Coleman MD    Office Visit    5 months ago Gastroesophageal reflux disease with esophagitis, unspecified whether hemorrhage    [facility]    [facility]    7 months ago [facility]ilateral [facility] cerumen    [facility]    Office Visit    8 months ago Medicare annual wellness visit, initial    [facility], [address], [place] Rachel Watson MD    Office Visit          Future Appointments         Provider Department Appt Notes    In 3 weeks Ramirez Law, DO [facility], [address], [place] - OB/GYN Overall gynecological health. last wc 10/24/23 -

## 2025-01-30 ENCOUNTER — OFFICE VISIT (OUTPATIENT)
Dept: OBGYN CLINIC | Facility: CLINIC | Age: 75
End: 2025-01-30
Payer: MEDICARE

## 2025-01-30 VITALS
HEIGHT: 61 IN | SYSTOLIC BLOOD PRESSURE: 131 MMHG | WEIGHT: 112 LBS | BODY MASS INDEX: 21.14 KG/M2 | DIASTOLIC BLOOD PRESSURE: 76 MMHG | HEART RATE: 60 BPM

## 2025-01-30 DIAGNOSIS — Z91.89 HISTORY OF HEALTH HAZARD: ICD-10-CM

## 2025-01-30 DIAGNOSIS — R35.1 NOCTURIA: ICD-10-CM

## 2025-01-30 DIAGNOSIS — N95.2 ATROPHIC VAGINITIS: ICD-10-CM

## 2025-01-30 DIAGNOSIS — R39.15 URINARY URGENCY: ICD-10-CM

## 2025-01-30 DIAGNOSIS — Z12.31 SCREENING MAMMOGRAM FOR BREAST CANCER: ICD-10-CM

## 2025-01-30 DIAGNOSIS — Z01.411 ENCOUNTER FOR GYNECOLOGICAL EXAMINATION WITH ABNORMAL FINDING: Primary | ICD-10-CM

## 2025-01-30 PROCEDURE — 3075F SYST BP GE 130 - 139MM HG: CPT | Performed by: OBSTETRICS & GYNECOLOGY

## 2025-01-30 PROCEDURE — 1159F MED LIST DOCD IN RCRD: CPT | Performed by: OBSTETRICS & GYNECOLOGY

## 2025-01-30 PROCEDURE — 3078F DIAST BP <80 MM HG: CPT | Performed by: OBSTETRICS & GYNECOLOGY

## 2025-01-30 PROCEDURE — G0101 CA SCREEN;PELVIC/BREAST EXAM: HCPCS | Performed by: OBSTETRICS & GYNECOLOGY

## 2025-01-30 PROCEDURE — 3008F BODY MASS INDEX DOCD: CPT | Performed by: OBSTETRICS & GYNECOLOGY

## 2025-01-30 NOTE — PROGRESS NOTES
Subjective:   Patient ID: Breanna Mccarthy is a 74 year old female.    HPI   and . No sexual relationship.     History/Other:   Review of Systems   Constitutional:  Negative for appetite change, fatigue and unexpected weight change.   Eyes:  Negative for visual disturbance.   Respiratory:  Negative for cough and shortness of breath.    Cardiovascular:  Negative for chest pain, palpitations and leg swelling.   Gastrointestinal:  Negative for abdominal distention, abdominal pain, blood in stool, constipation and diarrhea.   Genitourinary:  Positive for urgency and vaginal pain (dryness / irritation). Negative for dysuria, frequency and pelvic pain.   Musculoskeletal:  Negative for arthralgias and myalgias.   Skin:  Negative for rash.   Neurological:  Negative for weakness, numbness and headaches.   Psychiatric/Behavioral:  Negative for dysphoric mood. The patient is not nervous/anxious.      Current Outpatient Medications   Medication Sig Dispense Refill    ALPRAZolam 0.25 MG Oral Tab Take 1 tablet (0.25 mg total) by mouth nightly as needed for Sleep. 90 tablet 0    azelastine 0.1 % Nasal Solution USE 2 SPRAYS IN EACH NOSTRIL TWICE DAILY 90 mL 0    celecoxib (CELEBREX) 100 MG Oral Cap Take 1 capsule (100 mg total) by mouth daily as needed for Pain. (Patient not taking: Reported on 2024) 90 capsule 0    SUCRALFATE 1 g Oral Tab TAKE 1 TABLET(1 GRAM) BY MOUTH THREE TIMES DAILY BEFORE MEALS 90 tablet 2    estradiol 0.1 MG/GM Vaginal Cream Apply intravaginally 1 g twice a week as needed 42 g 2    hydrOXYzine 25 MG Oral Tab Take 1 tab p.o. at bedtime as needed 30 tablet 0    diclofenac 1 % External Gel Use topically 4 times daily. 50 g 11    Calcium Carbonate (CALCIUM 500 OR) Take by mouth. Liquid      Vitamin D3, Cholecalciferol, 50 MCG (2000 UT) Oral Cap       Alum Hydroxide-Mag Carbonate (GAVISCON OR)       LOW-DOSE ASPIRIN OR       Melatonin 3 MG Oral Cap       Multiple Vitamins-Minerals (BIOTIN  PLUS/CALCIUM/VIT D3) Oral Tab Take 1 tablet by mouth.      Emollient (COLLAGEN EX) Take by mouth daily. Collagen powder 2 tbsp       Probiotic Product (PROBIOTIC DAILY OR) Take 1 tablet by mouth daily.       Allergies:Allergies[1]    Objective:   Physical Exam  Constitutional:       General: She is not in acute distress.     Appearance: She is well-developed. She is not diaphoretic.   Neck:      Thyroid: No thyromegaly.   Cardiovascular:      Rate and Rhythm: Normal rate and regular rhythm.      Heart sounds: Normal heart sounds. No murmur heard.  Pulmonary:      Effort: Pulmonary effort is normal.      Breath sounds: Normal breath sounds. No wheezing or rales.   Chest:   Breasts:     Right: Normal. No mass, nipple discharge, skin change or tenderness.      Left: Normal. No mass, nipple discharge, skin change or tenderness.      Comments:     Abdominal:      General: There is no distension.      Palpations: Abdomen is soft. There is no mass.      Tenderness: There is no abdominal tenderness. There is no guarding or rebound.   Genitourinary:     Labia:         Right: No rash or lesion.         Left: No rash or lesion.       Vagina: Normal. No vaginal discharge.      Cervix: No cervical motion tenderness or discharge.      Uterus: Not enlarged and not tender.       Adnexa:         Right: No mass, tenderness or fullness.          Left: No mass, tenderness or fullness.        Comments: Significant atrophy c/w age.   Musculoskeletal:         General: No tenderness.      Cervical back: Neck supple.   Lymphadenopathy:      Cervical: No cervical adenopathy.      Upper Body:      Right upper body: No supraclavicular, axillary or pectoral adenopathy.      Left upper body: No supraclavicular, axillary or pectoral adenopathy.   Neurological:      Mental Status: She is alert.         Assessment & Plan:   1. Encounter for gynecological examination with abnormal finding    2. History of health hazard    3. Atrophic vaginitis    4.  Urinary urgency    5. Nocturia    6. Screening mammogram for breast cancer        Orders Placed This Encounter   Procedures    Hpv Dna  High Risk , Thin Prep Collect    ThinPrep PAP Smear    THINPREP PAP SMEAR ONLY       Meds This Visit:  Requested Prescriptions      No prescriptions requested or ordered in this encounter     PLAN: Will message / call when prescription is needed    Imaging & Referrals:  [facility] JACOB 2D+3D SCREENING BILAT (CPT=77067/26171)       [1]   Allergies  Allergen Reactions    Dust Mites Coughing, Runny nose and Tightness in Throat    Amoxicillin RASH

## 2025-01-31 LAB — HPV E6+E7 MRNA CVX QL NAA+PROBE: NEGATIVE

## 2025-02-13 ENCOUNTER — HOSPITAL ENCOUNTER (OUTPATIENT)
Age: 75
Discharge: HOME | End: 2025-02-13
Attending: EMERGENCY MEDICINE
Payer: MEDICARE

## 2025-02-13 VITALS
RESPIRATION RATE: 18 BRPM | SYSTOLIC BLOOD PRESSURE: 173 MMHG | HEART RATE: 76 BPM | TEMPERATURE: 98 F | DIASTOLIC BLOOD PRESSURE: 70 MMHG | OXYGEN SATURATION: 98 %

## 2025-02-13 DIAGNOSIS — J06.9 VIRAL URI: Primary | ICD-10-CM

## 2025-02-13 LAB
POCT INFLUENZA A: NEGATIVE
POCT INFLUENZA B: NEGATIVE
S PYO AG THROAT QL IA.RAPID: NEGATIVE
SARS-COV-2 RNA RESP QL NAA+PROBE: NOT DETECTED

## 2025-02-13 PROCEDURE — 87651 STREP A DNA AMP PROBE: CPT | Performed by: EMERGENCY MEDICINE

## 2025-02-13 PROCEDURE — 99212 OFFICE O/P EST SF 10 MIN: CPT

## 2025-02-13 PROCEDURE — 99213 OFFICE O/P EST LOW 20 MIN: CPT

## 2025-02-13 PROCEDURE — 87502 INFLUENZA DNA AMP PROBE: CPT | Performed by: EMERGENCY MEDICINE

## 2025-02-13 NOTE — ED PROVIDER NOTES
Patient Seen in: [facility] [place]      History     Chief Complaint   Patient presents with    Sore Throat     Stated Complaint: sore throat, sneezing    Subjective:   HPI      The patient is a 74-year-old female with past history of emphysema, hyperlipidemia who presents now with sore throat, nasal congestion.  Patient states she initially began experiencing a mild sore throat approximately 1 week ago.  The symptoms waxed and waned but have become more severe over the last 48 hours.  Patient has had some mild nasal congestion as well.  Patient denies any fever.  The patient denies any significant cough.    Objective:     Past Medical History:    Anemia    Anxiety state, unspecified    Arthritis    Esophageal reflux    H/O cystoscopy    H/O mammogram    H/O sigmoidoscopy    Hepatitis    High cholesterol    Lipid screening    per NG    Mitral valve disorder    per NG: slighty thickened mitral valve in early  as per pt. - No treatment    Other ill-defined conditions(799.89)    per NG: \"tonsils ? \"; \"hepatitis ?\"    Panic attacks    Pneumonia    PONV (postoperative nausea and vomiting)    Pregnancy (HCC)    per NG: \"child birth, 70,81,83\"    Pulmonary emphysema (HCC)    Rheumatic fever    Ruptured ectopic pregnancy (HCC)    L salpingectomy     (spontaneous vaginal delivery) (HCC)    x3              Past Surgical History:   Procedure Laterality Date    Colonoscopy      Tubal ligation      postpartum                No pertinent social history.            Review of Systems    Positive for stated complaint: sore throat, sneezing  Other systems are as noted in HPI.  Constitutional and vital signs reviewed.      All other systems reviewed and negative except as noted above.    Physical Exam     ED Triage Vitals [25 0807]   BP (!) 173/70   Pulse 76   Resp 18   Temp 97.6 °F (36.4 °C)   Temp src Oral   SpO2 98 %   O2 Device None (Room air)       Current Vitals:   Vital Signs  BP: (!) 173/70  Pulse:  76  Resp: 18  Temp: 97.6 °F (36.4 °C)  Temp src: Oral    Oxygen Therapy  SpO2: 98 %  O2 Device: None (Room air)        Physical Exam    Constitutional: Well-developed well-nourished in no acute distress  Head: Normocephalic, no swelling or tenderness  Eyes: Nonicteric sclera, no conjunctival injection  ENT: TMs are clear and flat bilaterally.  There is mild posterior pharyngeal erythema  Chest: Clear to auscultation, no tenderness  Cardiovascular: Regular rate and rhythm without murmur  Abdomen: Soft, nontender and nondistended  Neurologic: Patient is awake, alert and oriented ×3.  The patient's motor strength is 5 out of 5 and symmetric in the upper and lower extremities bilaterally  Extremities: No focal swelling or tenderness  Skin: No pallor, no redness or warmth to the touch      ED Course     Labs Reviewed   RAPID STREP A - Normal   RAPID SARS-COV-2 BY PCR - Normal   POCT FLU TEST - Normal    Narrative:     This assay is a rapid molecular in vitro test utilizing nucleic acid amplification of influenza A and B viral RNA.     Pulse ox is 98% on room air, normal       Patient's negative COVID, negative flu, negative strep were reviewed with the patient.  Probable viral etiology of the patient's symptoms.  Patient's initial blood pressure here was elevated.  The patient denies any previous history of hypertension.  She will follow-up with her primary MD for any persistently elevated blood pressures.       MDM      Viral syndrome versus strep throat versus COVID        Medical Decision Making      Disposition and Plan     Clinical Impression:  1. Viral URI         Disposition:  Discharge  2/13/2025  8:44 am    Follow-up:  Rachel Watson MD  [address] [place] [id number] [phone]      For repeat blood pressure check in a couple of weeks          Medications Prescribed:  Current Discharge Medication List              Supplementary Documentation:

## 2025-03-10 DIAGNOSIS — F41.9 ANXIETY: ICD-10-CM

## 2025-03-12 RX ORDER — ALPRAZOLAM 0.25 MG
0.25 TABLET ORAL NIGHTLY PRN
Qty: 90 TABLET | Refills: 0 | Status: SHIPPED | OUTPATIENT
Start: 2025-03-12

## 2025-03-12 NOTE — TELEPHONE ENCOUNTER
Patient of Dr. Watson.    Please kindly review this medication    [x] FAILS PROTOCOL            [x] Controlled Substance             [] Medication not previously prescribed by Provider            [] Due for appointment- no future appointment scheduled            [] BP reading            [] Labs    [] HAS NO PROTOCOL ATTACHED     Recent fill dates: 1/9/25, and 11/20/24  Date of  last written prescription: 8/30/24   Last written quantity: #30 each and processed as a 30 day supply  LAST OFFICE VISIT: 8/23/24  [x] Takes as needed  [] Takes scheduled daily

## 2025-04-15 ENCOUNTER — PATIENT MESSAGE (OUTPATIENT)
Dept: INTERNAL MEDICINE CLINIC | Facility: CLINIC | Age: 75
End: 2025-04-15

## 2025-04-15 ENCOUNTER — HOSPITAL ENCOUNTER (OUTPATIENT)
Dept: GENERAL RADIOLOGY | Age: 75
Discharge: HOME | End: 2025-04-15
Attending: INTERNAL MEDICINE
Payer: MEDICARE

## 2025-04-15 ENCOUNTER — OFFICE VISIT (OUTPATIENT)
Dept: INTERNAL MEDICINE CLINIC | Facility: CLINIC | Age: 75
End: 2025-04-15
Payer: MEDICARE

## 2025-04-15 VITALS
BODY MASS INDEX: 21.45 KG/M2 | WEIGHT: 113.63 LBS | HEIGHT: 61 IN | SYSTOLIC BLOOD PRESSURE: 140 MMHG | DIASTOLIC BLOOD PRESSURE: 65 MMHG | HEART RATE: 66 BPM

## 2025-04-15 DIAGNOSIS — M35.00 SICCA SYNDROME (HCC): ICD-10-CM

## 2025-04-15 DIAGNOSIS — R53.83 OTHER FATIGUE: ICD-10-CM

## 2025-04-15 DIAGNOSIS — K21.00 GASTROESOPHAGEAL REFLUX DISEASE WITH ESOPHAGITIS WITHOUT HEMORRHAGE: ICD-10-CM

## 2025-04-15 DIAGNOSIS — N63.15 MASS OVERLAPPING MULTIPLE QUADRANTS OF RIGHT BREAST: ICD-10-CM

## 2025-04-15 DIAGNOSIS — Z00.00 MEDICARE ANNUAL WELLNESS VISIT, INITIAL: Primary | ICD-10-CM

## 2025-04-15 DIAGNOSIS — M25.551 PAIN OF RIGHT HIP: ICD-10-CM

## 2025-04-15 DIAGNOSIS — E78.49 OTHER HYPERLIPIDEMIA: Primary | ICD-10-CM

## 2025-04-15 DIAGNOSIS — M77.8 THUMB TENDONITIS: ICD-10-CM

## 2025-04-15 DIAGNOSIS — M15.9 OSTEOARTHRITIS OF MULTIPLE JOINTS, UNSPECIFIED OSTEOARTHRITIS TYPE: ICD-10-CM

## 2025-04-15 DIAGNOSIS — M25.521 RIGHT ELBOW PAIN: ICD-10-CM

## 2025-04-15 DIAGNOSIS — M18.11 PRIMARY OSTEOARTHRITIS OF FIRST CARPOMETACARPAL JOINT OF RIGHT HAND: ICD-10-CM

## 2025-04-15 PROCEDURE — 1170F FXNL STATUS ASSESSED: CPT | Performed by: INTERNAL MEDICINE

## 2025-04-15 PROCEDURE — 1159F MED LIST DOCD IN RCRD: CPT | Performed by: INTERNAL MEDICINE

## 2025-04-15 PROCEDURE — G0439 PPPS, SUBSEQ VISIT: HCPCS | Performed by: INTERNAL MEDICINE

## 2025-04-15 PROCEDURE — 73502 X-RAY EXAM HIP UNI 2-3 VIEWS: CPT | Performed by: INTERNAL MEDICINE

## 2025-04-15 PROCEDURE — 3077F SYST BP >= 140 MM HG: CPT | Performed by: INTERNAL MEDICINE

## 2025-04-15 PROCEDURE — 3078F DIAST BP <80 MM HG: CPT | Performed by: INTERNAL MEDICINE

## 2025-04-15 PROCEDURE — 96160 PT-FOCUSED HLTH RISK ASSMT: CPT | Performed by: INTERNAL MEDICINE

## 2025-04-15 PROCEDURE — 1126F AMNT PAIN NOTED NONE PRSNT: CPT | Performed by: INTERNAL MEDICINE

## 2025-04-15 PROCEDURE — 3008F BODY MASS INDEX DOCD: CPT | Performed by: INTERNAL MEDICINE

## 2025-04-15 NOTE — PROGRESS NOTES
Subjective:   Breanna Mccarthy is a 75 year old female who presents for a MA AHA (Medicare Advantage Annual Health Assessment) and Subsequent Annual Wellness visit (Pt already had Initial Annual Wellness) and scheduled follow up of multiple significant but stable problems.     Patient continues to be bothered by frequent sore throats, intermittent dry cough and hoarseness.  Reevaluation did not reveal new abnormalities.  Was advised that she has GERD and laryngeal reflux.  Lately bothered by right hip pain that present on changing body positions.  History/Other:   Fall Risk Assessment:   She has been screened for Falls and is low risk.      Cognitive Assessment:   She had a completely normal cognitive assessment - see flowsheet entries     Functional Ability/Status:   Breanna Mccarthy has a completely normal functional assessment. See flowsheet for details.      Depression Screening (PHQ):  PHQ-2 SCORE: 0  , done 4/15/2025       Advanced Directives:   She has a Living Will on file in [place]; reviewed and discussed documents with patient (and family/surrogate if present).  She has a Power of  for Health Care on file in [place].    Problem List[1]  Allergies:  She is allergic to dust mites and amoxicillin.    Current Medications:  Active Meds, Sig Only[2]    Medical History:  She  has a past medical history of Anemia, Anxiety state, unspecified, Arthritis, Esophageal reflux, H/O cystoscopy (, ), H/O mammogram (2000), H/O sigmoidoscopy (2000), Hepatitis, High cholesterol, Lipid screening (10/26/2013), Mitral valve disorder, Other ill-defined conditions(799.89), Panic attacks, Pneumonia, PONV (postoperative nausea and vomiting), Pregnancy (), Pulmonary emphysema (), Rheumatic fever, Ruptured ectopic pregnancy (HCC) (), and  (spontaneous vaginal delivery) ([facility]).  Surgical History:  She  has a past surgical history that includes tubal ligation () and colonoscopy.   Family  History:  Her family history includes Cancer in her brother; Diabetes in her father, paternal grandmother, sister, sister, and another family member; Lipids in her sister and sister; Ovarian Cancer in her paternal cousin female; Ovarian Cancer (age of onset: 35) in her maternal cousin female; Pancreatic Cancer in her brother; Prostate Cancer in her father; renal failure in her mother.  Social History:  She  reports that she quit smoking about 45 years ago. Her smoking use included cigarettes. She started smoking about 57 years ago. She has a 6 pack-year smoking history. She has never been exposed to tobacco smoke. She has never used smokeless tobacco. She reports current alcohol use of about 1.0 standard drink of alcohol per week. She reports that she does not use drugs.    Tobacco:  She smoked tobacco in the past but quit greater than 12 months ago.  Tobacco Use[3]     CAGE Alcohol Screen:   CAGE screening score of 0 on 4/15/2025, showing low risk of alcohol abuse.      Patient Care Team:  Rachel Watson MD as PCP - Ramirez Carter DO (OBSTETRICS & GYNECOLOGY)      Physical Exam  Constitutional:       Appearance: Normal appearance.   HENT:      Head: Normocephalic and atraumatic.   Cardiovascular:      Rate and Rhythm: Normal rate and regular rhythm.      Heart sounds: No murmur heard.     No gallop.   Pulmonary:      Effort: Pulmonary effort is normal.      Breath sounds: No wheezing or rhonchi.   Chest:   Breasts:     Right: Mass present. No skin change or tenderness.      Left: No mass or skin change.          Comments: Large mass deep in the breast at the level of the nipple movable  Abdominal:      General: Abdomen is flat. There is no distension.      Palpations: There is no mass.      Tenderness: There is no guarding or rebound.   Musculoskeletal:         General: Normal range of motion.      Cervical back: Normal range of motion and neck supple.      Right lower leg: No edema.      Left lower  leg: No edema.   Lymphadenopathy:      Cervical: No cervical adenopathy.   Skin:     General: Skin is warm.      Coloration: Skin is not jaundiced.   Neurological:      General: No focal deficit present.      Mental Status: She is alert and oriented to person, place, and time. Mental status is at baseline.   Psychiatric:         Mood and Affect: Mood normal.         Behavior: Behavior normal.         Judgment: Judgment normal.         /65   Pulse 66   Ht 5' 1\" (1.549 m)   Wt 113 lb 9.6 oz (51.5 kg)   BMI 21.46 kg/m²  Estimated body mass index is 21.46 kg/m² as calculated from the following:    Height as of this encounter: 5' 1\" (1.549 m).    Weight as of this encounter: 113 lb 9.6 oz (51.5 kg).    Medicare Hearing Assessment:   Hearing Screening    Time taken: 4/15/2025 10:48 AM  Entry User: Maile Johnson MA  Screening Method: Finger Rub  Tuning Fork Result: Pass  Finger Rub Result: Pass         Visual Acuity:   Right Eye Visual Acuity: Corrected Right Eye Chart Acuity: 20/30   Left Eye Visual Acuity: Corrected Left Eye Chart Acuity: 20/30   Both Eyes Visual Acuity: Corrected Both Eyes Chart Acuity: 20/30   Able To Tolerate Visual Acuity: Yes        Assessment & Plan:   Breanna Mccarthy is a 75 year old female who presents for a Medicare Assessment.     1. Medicare annual wellness visit, initial (Primary)  2. Mass overlapping multiple quadrants of right breast diagnostic right breast mammogram ordered  -     [facility] DIAGNOSTIC RIGHT (CPT=77065); Future; Expected date: 04/15/2025  3. Gastroesophageal reflux disease with esophagitis without hemorrhage continue GERD diet and other precautions recommended by ENT  4. Pain of right hip x-ray of the hip  -     XR HIP W OR WO PELVIS 2 OR 3 VIEWS, RIGHT (CPT=73502) [[id number]] - Orthopedic providers only; Future; Expected date: 04/15/2025    The patient indicates understanding of these issues and agrees to the plan.      Follow-up in 1 year or sooner if  needed    Rachel Watson MD, 4/15/2025     Supplementary Documentation:   General Health:  In the past six months, have you lost more than 10 pounds without trying?: (Patient-Rptd) 2 - No  Has your appetite been poor?: (Patient-Rptd) No  Type of Diet: (Patient-Rptd) Balanced  How does the patient maintain a good energy level?: (Patient-Rptd) Other  How would you describe your daily physical activity?: (Patient-Rptd) Moderate  How would you describe your current health state?: (Patient-Rptd) Fair  How do you maintain positive mental well-being?: (Patient-Rptd) Social Interaction, Visiting Friends, Visiting Family  On a scale of 0 to 10, with 0 being no pain and 10 being severe pain, what is your pain level?: (Patient-Rptd) 5 - (Moderate)  In the past six months, have you experienced urine leakage?: (Patient-Rptd) 0-No  At any time do you feel concerned for the safety/well-being of yourself and/or your children, in your home or elsewhere?: (Patient-Rptd) No  Have you had any immunizations at another office such as Influenza, Hepatitis B, Tetanus, or Pneumococcal?: (Patient-Rptd) Yes    Health Maintenance   Topic Date Due    Zoster Vaccines (2 of 2) 10/02/2019    COVID-19 Vaccine (2 - 2024-25 season) 09/01/2024    Annual Well Visit  01/01/2025    HTN: BP Follow-Up  05/15/2025    Influenza Vaccine (Season Ended) 10/01/2025    Pap Smear  01/30/2028    DEXA Scan  Completed    Annual Depression Screening  Completed    Fall Risk Screening (Annual)  Completed    Pneumococcal Vaccine: 50+ Years  Completed    Meningococcal B Vaccine  Aged Out    Colorectal Cancer Screening  Discontinued    Mammogram  Discontinued            [1]   Patient Active Problem List  Diagnosis    Other hyperlipidemia    Primary osteoarthritis of first carpometacarpal joint of right hand    GERD with esophagitis    LPRD (laryngopharyngeal reflux disease)    Primary osteoarthritis of both feet    Atrophic vaginitis    Sicca syndrome (HCC)    Urinary  urgency    Nocturia   [2]   Outpatient Medications Marked as Taking for the 4/15/25 encounter (Office Visit) with Rachel Watson MD   Medication Sig    ALPRAZolam 0.25 MG Oral Tab Take 1 tablet (0.25 mg total) by mouth nightly as needed for Sleep.    SUCRALFATE 1 g Oral Tab TAKE 1 TABLET(1 GRAM) BY MOUTH THREE TIMES DAILY BEFORE MEALS    estradiol 0.1 MG/GM Vaginal Cream Apply intravaginally 1 g twice a week as needed    diclofenac 1 % External Gel Use topically 4 times daily.    Calcium Carbonate (CALCIUM 500 OR) Take by mouth. Liquid    Vitamin D3, Cholecalciferol, 50 MCG ( UT) Oral Cap     Alum Hydroxide-Mag Carbonate (GAVISCON OR)     LOW-DOSE ASPIRIN OR     Melatonin 3 MG Oral Cap     Multiple Vitamins-Minerals (BIOTIN PLUS/CALCIUM/VIT D3) Oral Tab Take 1 tablet by mouth.    Emollient (COLLAGEN EX) Take by mouth daily. Collagen powder 2 tbsp     Probiotic Product (PROBIOTIC DAILY OR) Take 1 tablet by mouth daily.   [3]   Social History  Tobacco Use   Smoking Status Former    Current packs/day: 0.00    Average packs/day: 0.5 packs/day for 12.0 years (6.0 ttl pk-yrs)    Types: Cigarettes    Start date: 1968    Quit date: 1980    Years since quittin.2    Passive exposure: Never   Smokeless Tobacco Never   Tobacco Comments    Light smoker starting as a teenager, lasting about 14 years

## 2025-04-16 NOTE — TELEPHONE ENCOUNTER
DR Watson == See message, please advise on lab order. Per chart review, routine annual labs were done on 1/27/2023. Subsequent labs were for acute symptoms.    RN pended routine lab test.     Please respond directly to the patient if no additional staff support is required.      Future Appointments   Date Time Provider Department Center   4/30/2025  8:00 AM [facility] RM[facility] EM [facility]

## 2025-04-18 ENCOUNTER — TELEPHONE (OUTPATIENT)
Dept: INTERNAL MEDICINE CLINIC | Facility: CLINIC | Age: 75
End: 2025-04-18

## 2025-04-18 NOTE — TELEPHONE ENCOUNTER
Can you help this patient to get diagnostic mammogram of right breast sooner than April 30, I felt mass on exam

## 2025-04-21 NOTE — TELEPHONE ENCOUNTER
Noted they have an opening 4/23/25. [facility] department will be contacting pt.

## 2025-04-23 ENCOUNTER — HOSPITAL ENCOUNTER (OUTPATIENT)
Dept: MAMMOGRAPHY | Facility: HOSPITAL | Age: 75
Discharge: HOME | End: 2025-04-23
Attending: INTERNAL MEDICINE
Payer: MEDICARE

## 2025-04-23 ENCOUNTER — HOSPITAL ENCOUNTER (OUTPATIENT)
Dept: ULTRASOUND IMAGING | Facility: HOSPITAL | Age: 75
Discharge: HOME | End: 2025-04-23
Attending: INTERNAL MEDICINE
Payer: MEDICARE

## 2025-04-23 DIAGNOSIS — N63.15 MASS OVERLAPPING MULTIPLE QUADRANTS OF RIGHT BREAST: ICD-10-CM

## 2025-04-23 PROCEDURE — 76642 ULTRASOUND BREAST LIMITED: CPT | Performed by: INTERNAL MEDICINE

## 2025-04-23 PROCEDURE — 77065 DX MAMMO INCL CAD UNI: CPT | Performed by: INTERNAL MEDICINE

## 2025-04-23 PROCEDURE — 77061 BREAST TOMOSYNTHESIS UNI: CPT | Performed by: INTERNAL MEDICINE

## 2025-04-24 ENCOUNTER — TELEPHONE (OUTPATIENT)
Dept: INTERNAL MEDICINE CLINIC | Facility: CLINIC | Age: 75
End: 2025-04-24

## 2025-04-24 ENCOUNTER — PATIENT MESSAGE (OUTPATIENT)
Dept: INTERNAL MEDICINE CLINIC | Facility: CLINIC | Age: 75
End: 2025-04-24

## 2025-04-24 DIAGNOSIS — N64.59 ABNORMAL BREAST EXAM: Primary | ICD-10-CM

## 2025-04-25 NOTE — TELEPHONE ENCOUNTER
Patient called back on this.  She will be out of town for 2 months this summer and the soonest she can see Dr. Starks is August.  Inquires how urgently Dr. Watson would like her seen and if it ok to wait.  She is confused why a palpable lump was not identified on imaging.  Dr. Watson please advise.

## 2025-05-06 ENCOUNTER — LAB ENCOUNTER (OUTPATIENT)
Dept: LAB | Age: 75
End: 2025-05-06
Attending: INTERNAL MEDICINE
Payer: MEDICARE

## 2025-05-06 DIAGNOSIS — M35.00 SICCA SYNDROME (HCC): ICD-10-CM

## 2025-05-06 DIAGNOSIS — E78.49 OTHER HYPERLIPIDEMIA: ICD-10-CM

## 2025-05-06 DIAGNOSIS — M15.9 OSTEOARTHRITIS OF MULTIPLE JOINTS, UNSPECIFIED OSTEOARTHRITIS TYPE: ICD-10-CM

## 2025-05-06 DIAGNOSIS — R53.83 OTHER FATIGUE: ICD-10-CM

## 2025-05-06 LAB
ALBUMIN SERPL-MCNC: 4.5 G/DL (ref 3.2–4.8)
ALBUMIN/GLOB SERPL: 1.8 {RATIO} (ref 1–2)
ALP LIVER SERPL-CCNC: 78 U/L (ref 55–142)
ALT SERPL-CCNC: 12 U/L (ref 10–49)
ANION GAP SERPL CALC-SCNC: 9 MMOL/L (ref 0–18)
AST SERPL-CCNC: 25 U/L (ref ?–34)
BASOPHILS # BLD AUTO: 0.08 X10(3) UL (ref 0–0.2)
BASOPHILS NFR BLD AUTO: 1.6 %
BILIRUB SERPL-MCNC: 1 MG/DL (ref 0.2–1.1)
BUN BLD-MCNC: 17 MG/DL (ref 9–23)
BUN/CREAT SERPL: 18.7 (ref 10–20)
CALCIUM BLD-MCNC: 9.6 MG/DL (ref 8.7–10.4)
CHLORIDE SERPL-SCNC: 108 MMOL/L (ref 98–112)
CHOLEST SERPL-MCNC: 251 MG/DL (ref ?–200)
CO2 SERPL-SCNC: 27 MMOL/L (ref 21–32)
CREAT BLD-MCNC: 0.91 MG/DL (ref 0.55–1.02)
DEPRECATED RDW RBC AUTO: 42.3 FL (ref 35.1–46.3)
EGFRCR SERPLBLD CKD-EPI 2021: 65 ML/MIN/1.73M2 (ref 60–?)
EOSINOPHIL # BLD AUTO: 0.19 X10(3) UL (ref 0–0.7)
EOSINOPHIL NFR BLD AUTO: 3.7 %
ERYTHROCYTE [DISTWIDTH] IN BLOOD BY AUTOMATED COUNT: 12.3 % (ref 11–15)
FASTING PATIENT LIPID ANSWER: YES
FASTING STATUS PATIENT QL REPORTED: YES
GLOBULIN PLAS-MCNC: 2.5 G/DL (ref 2–3.5)
GLUCOSE BLD-MCNC: 96 MG/DL (ref 70–99)
HCT VFR BLD AUTO: 42.2 % (ref 35–48)
HDLC SERPL-MCNC: 80 MG/DL (ref 40–59)
HGB BLD-MCNC: 13.9 G/DL (ref 12–16)
IMM GRANULOCYTES # BLD AUTO: 0.01 X10(3) UL (ref 0–1)
IMM GRANULOCYTES NFR BLD: 0.2 %
LDLC SERPL CALC-MCNC: 161 MG/DL (ref ?–100)
LYMPHOCYTES # BLD AUTO: 1.92 X10(3) UL (ref 1–4)
LYMPHOCYTES NFR BLD AUTO: 37.5 %
MCH RBC QN AUTO: 31.2 PG (ref 26–34)
MCHC RBC AUTO-ENTMCNC: 32.9 G/DL (ref 31–37)
MCV RBC AUTO: 94.8 FL (ref 80–100)
MONOCYTES # BLD AUTO: 0.52 X10(3) UL (ref 0.1–1)
MONOCYTES NFR BLD AUTO: 10.2 %
NEUTROPHILS # BLD AUTO: 2.4 X10 (3) UL (ref 1.5–7.7)
NEUTROPHILS # BLD AUTO: 2.4 X10(3) UL (ref 1.5–7.7)
NEUTROPHILS NFR BLD AUTO: 46.8 %
NONHDLC SERPL-MCNC: 171 MG/DL (ref ?–130)
OSMOLALITY SERPL CALC.SUM OF ELEC: 299 MOSM/KG (ref 275–295)
PLATELET # BLD AUTO: 269 10(3)UL (ref 150–450)
POTASSIUM SERPL-SCNC: 4.1 MMOL/L (ref 3.5–5.1)
PROT SERPL-MCNC: 7 G/DL (ref 5.7–8.2)
RBC # BLD AUTO: 4.45 X10(6)UL (ref 3.8–5.3)
SODIUM SERPL-SCNC: 144 MMOL/L (ref 136–145)
TRIGL SERPL-MCNC: 64 MG/DL (ref 30–149)
TSI SER-ACNC: 2.89 UIU/ML (ref 0.55–4.78)
VLDLC SERPL CALC-MCNC: 12 MG/DL (ref 0–30)
WBC # BLD AUTO: 5.1 X10(3) UL (ref 4–11)

## 2025-05-06 PROCEDURE — 84443 ASSAY THYROID STIM HORMONE: CPT

## 2025-05-06 PROCEDURE — 85025 COMPLETE CBC W/AUTO DIFF WBC: CPT

## 2025-05-06 PROCEDURE — 36415 COLL VENOUS BLD VENIPUNCTURE: CPT

## 2025-05-06 PROCEDURE — 80061 LIPID PANEL: CPT

## 2025-05-06 PROCEDURE — 80053 COMPREHEN METABOLIC PANEL: CPT

## 2025-05-19 ENCOUNTER — TELEPHONE (OUTPATIENT)
Facility: CLINIC | Age: 75
End: 2025-05-19

## 2025-06-30 DIAGNOSIS — F41.9 ANXIETY: ICD-10-CM

## 2025-06-30 RX ORDER — ALPRAZOLAM 0.25 MG
0.25 TABLET ORAL NIGHTLY PRN
Qty: 90 TABLET | Refills: 1 | Status: SHIPPED | OUTPATIENT
Start: 2025-06-30

## 2025-06-30 NOTE — TELEPHONE ENCOUNTER
Please review. Refill failed protocol.     Recent fills each # 30 : 4/30/25 , 3/12/25 , 1/9/25  Last prescription written: 3/12/25  Last office visit:  4/15/2025    Future Appointments   Date Time Provider Department Center   8/13/2025  8:30 AM Nakia Starks MD [facility]

## 2025-06-30 NOTE — TELEPHONE ENCOUNTER
Per patient she needs refill on her Alprazolam and running out of medication, please send to her pharmacy [facility] on file verified.    Current Medications[1]    ALPRAZolam 0.25 MG Oral Tab Take 1 tablet (0.25 mg total) by mouth nightly as needed for Sleep. 90 tablet 0          [1]   Current Outpatient Medications   Medication Sig Dispense Refill    ALPRAZolam 0.25 MG Oral Tab Take 1 tablet (0.25 mg total) by mouth nightly as needed for Sleep. 90 tablet 0    SUCRALFATE 1 g Oral Tab TAKE 1 TABLET(1 GRAM) BY MOUTH THREE TIMES DAILY BEFORE MEALS 90 tablet 2    estradiol 0.1 MG/GM Vaginal Cream Apply intravaginally 1 g twice a week as needed 42 g 2    diclofenac 1 % External Gel Use topically 4 times daily. 50 g 11    Calcium Carbonate (CALCIUM 500 OR) Take by mouth. Liquid      Vitamin D3, Cholecalciferol, 50 MCG (2000 UT) Oral Cap       Alum Hydroxide-Mag Carbonate (GAVISCON OR)       LOW-DOSE ASPIRIN OR       Melatonin 3 MG Oral Cap       Multiple Vitamins-Minerals (BIOTIN PLUS/CALCIUM/VIT D3) Oral Tab Take 1 tablet by mouth.      Emollient (COLLAGEN EX) Take by mouth daily. Collagen powder 2 tbsp       Probiotic Product (PROBIOTIC DAILY OR) Take 1 tablet by mouth daily.

## 2025-08-08 ENCOUNTER — NURSE TRIAGE (OUTPATIENT)
Dept: INTERNAL MEDICINE CLINIC | Facility: CLINIC | Age: 75
End: 2025-08-08

## 2025-08-09 ENCOUNTER — OFFICE VISIT (OUTPATIENT)
Dept: FAMILY MEDICINE CLINIC | Facility: CLINIC | Age: 75
End: 2025-08-09

## 2025-08-09 VITALS
HEART RATE: 64 BPM | OXYGEN SATURATION: 99 % | RESPIRATION RATE: 16 BRPM | SYSTOLIC BLOOD PRESSURE: 138 MMHG | DIASTOLIC BLOOD PRESSURE: 66 MMHG | WEIGHT: 112 LBS | BODY MASS INDEX: 21.14 KG/M2 | TEMPERATURE: 98 F | HEIGHT: 61 IN

## 2025-08-09 DIAGNOSIS — J02.9 ACUTE PHARYNGITIS, UNSPECIFIED ETIOLOGY: Primary | ICD-10-CM

## 2025-08-09 PROCEDURE — 99213 OFFICE O/P EST LOW 20 MIN: CPT | Performed by: NURSE PRACTITIONER

## 2025-08-09 PROCEDURE — 3078F DIAST BP <80 MM HG: CPT | Performed by: NURSE PRACTITIONER

## 2025-08-09 PROCEDURE — 1160F RVW MEDS BY RX/DR IN RCRD: CPT | Performed by: NURSE PRACTITIONER

## 2025-08-09 PROCEDURE — 1159F MED LIST DOCD IN RCRD: CPT | Performed by: NURSE PRACTITIONER

## 2025-08-09 PROCEDURE — 3075F SYST BP GE 130 - 139MM HG: CPT | Performed by: NURSE PRACTITIONER

## 2025-08-09 PROCEDURE — 87880 STREP A ASSAY W/OPTIC: CPT | Performed by: NURSE PRACTITIONER

## 2025-08-09 PROCEDURE — 3008F BODY MASS INDEX DOCD: CPT | Performed by: NURSE PRACTITIONER

## 2025-08-11 ENCOUNTER — TELEPHONE (OUTPATIENT)
Facility: CLINIC | Age: 75
End: 2025-08-11

## 2025-08-11 DIAGNOSIS — Z12.11 COLON CANCER SCREENING: Primary | ICD-10-CM

## 2025-08-11 RX ORDER — ASCORBIC ACID 500 MG
500 TABLET ORAL DAILY
COMMUNITY

## 2025-08-13 ENCOUNTER — OFFICE VISIT (OUTPATIENT)
Facility: CLINIC | Age: 75
End: 2025-08-13

## 2025-08-13 VITALS
HEART RATE: 71 BPM | OXYGEN SATURATION: 98 % | HEIGHT: 61 IN | SYSTOLIC BLOOD PRESSURE: 125 MMHG | WEIGHT: 112 LBS | DIASTOLIC BLOOD PRESSURE: 76 MMHG | BODY MASS INDEX: 21.14 KG/M2

## 2025-08-13 DIAGNOSIS — Z12.31 SCREENING MAMMOGRAM, ENCOUNTER FOR: ICD-10-CM

## 2025-08-13 DIAGNOSIS — D17.1 LIPOMA OF BREAST: Primary | ICD-10-CM

## 2025-08-13 PROCEDURE — 1159F MED LIST DOCD IN RCRD: CPT | Performed by: SURGERY

## 2025-08-13 PROCEDURE — 3008F BODY MASS INDEX DOCD: CPT | Performed by: SURGERY

## 2025-08-13 PROCEDURE — 3078F DIAST BP <80 MM HG: CPT | Performed by: SURGERY

## 2025-08-13 PROCEDURE — 1160F RVW MEDS BY RX/DR IN RCRD: CPT | Performed by: SURGERY

## 2025-08-13 PROCEDURE — 1126F AMNT PAIN NOTED NONE PRSNT: CPT | Performed by: SURGERY

## 2025-08-13 PROCEDURE — 99204 OFFICE O/P NEW MOD 45 MIN: CPT | Performed by: SURGERY

## 2025-08-13 PROCEDURE — 3074F SYST BP LT 130 MM HG: CPT | Performed by: SURGERY

## 2025-08-31 PROBLEM — D17.1 LIPOMA OF BREAST: Status: ACTIVE | Noted: 2025-08-31

## (undated) DEVICE — Device: Brand: DUAL NARE NASAL CANNULAE FEMALE LUER CON 7FT O2 TUBE

## (undated) DEVICE — YANKAUER SUCTION INSTRUMENT NO CONTROL VENT, BULB TIP, CLEAR: Brand: YANKAUER

## (undated) DEVICE — 60 ML SYRINGE REGULAR TIP: Brand: MONOJECT

## (undated) DEVICE — KIT ENDO ORCAPOD 160/180/190

## (undated) DEVICE — MEDI-VAC NON-CONDUCTIVE SUCTION TUBING: Brand: CARDINAL HEALTH

## (undated) DEVICE — MEDI-VAC NON-CONDUCTIVE SUCTION TUBING 6MM X 1.8M (6FT.) L: Brand: CARDINAL HEALTH

## (undated) DEVICE — FORCEPS BX L240CM DIA2.4MM L NDL RAD JAW 4

## (undated) DEVICE — CONMED SCOPE SAVER BITE BLOCK, 20X27 MM: Brand: SCOPE SAVER

## (undated) DEVICE — KIT CLEAN ENDOKIT 1.1OZ GOWNX2

## (undated) NOTE — LETTER
[address] [facility]    Consent for Coronary CT Angiography    Your doctor has recommended that you Lorri Martin have a Coronary CT Angiography procedure.  Coronary CT Angiography is a diagnostic procedure using computed patient when taking medication. Allergic reactions to medication can range from very minor to very serious leading to a life threatening situation or even death. Please be sure to communicate any allergy you may have to your caregiver immediately.     The i

## (undated) NOTE — LETTER
[address] [facility] [address], [place]  Authorization for Surgical Operation and Procedure                                                                                           I hereby authorize Diann Kendall MD, my physician and his/her assistants (if applicable), which may include medical students, residents, and/or fellows, to perform the following surgical operation/ procedure and administer such anesthesia as may be determined necessary by my physician: Operation/Procedure name (s) ESOPHAGOGASTRODUODENOSCOPY on [address]   2. I recognize that during the surgical operation/procedure, unforeseen conditions may necessitate additional or different procedures than those listed above. I, therefore, further authorize and request that the above-named surgeon, assistants, or designees perform such procedures as are, in their judgment, necessary and desirable. 3.   My surgeon/physician has discussed prior to my surgery the potential benefits, risks and side effects of this procedure; the likelihood of achieving goals; and potential problems that might occur during recuperation. They also discussed reasonable alternatives to the procedure, including risks, benefits, and side effects related to the alternatives and risks related to not receiving this procedure. I have had all my questions answered and I acknowledge that no guarantee has been made as to the result that may be obtained. 4.   Should the need arise during my operation/procedure, which includes change of level of care prior to discharge, I also consent to the administration of blood and/or blood products. Further, I understand that despite careful testing and screening of blood or blood products by collecting agencies, I may still be subject to ill effects as a result of receiving a blood transfusion and/or blood products.   The following are some, but not all, of the potential risks that can occur: fever and allergic reactions, hemolytic reactions, transmission of diseases such as Hepatitis, AIDS and Cytomegalovirus (CMV) and fluid overload. In the event that I wish to have an autologous transfusion of my own blood, or a directed donor transfusion, I will discuss this with my physician. Check only if Refusing Blood or Blood Products  I understand refusal of blood or blood products as deemed necessary by my physician may have serious consequences to my condition to include possible death. I hereby assume responsibility for my refusal and release the hospital, its personnel, and my physicians from any responsibility for the consequences of my refusal.    o  Refuse   5. I authorize the use of any specimen, organs, tissues, body parts or foreign objects that may be removed from my body during the operation/procedure for diagnosis, research or teaching purposes and their subsequent disposal by hospital authorities. I also authorize the release of specimen test results and/or written reports to my treating physician on the hospital medical staff or other referring or consulting physicians involved in my care, at the discretion of the Pathologist or my treating physician. 6.   I consent to the photographing or videotaping of the operations or procedures to be performed, including appropriate portions of my body for medical, scientific, or educational purposes, provided my identity is not revealed by the pictures or by descriptive texts accompanying them. If the procedure has been photographed/videotaped, the surgeon will obtain the original picture, image, videotape or CD. The hospital will not be responsible for storage, release or maintenance of the picture, image, tape or CD.    7.   I consent to the presence of a  or observers in the operating room as deemed necessary by my physician or their designees.     8.   I recognize that in the event my procedure results in extended X-Ray/fluoroscopy time, I may develop a skin reaction. 9. If I have a Do Not Attempt Resuscitation (DNAR) order in place, that status will be suspended while in the operating room, procedural suite, and during the recovery period unless otherwise explicitly stated by me (or a person authorized to consent on my behalf). The surgeon or my attending physician will determine when the applicable recovery period ends for purposes of reinstating the DNAR order. 10. Patients having a sterilization procedure: I understand that if the procedure is successful the results will be permanent and it will therefore be impossible for me to inseminate, conceive, or bear children. I also understand that the procedure is intended to result in sterility, although the result has not been guaranteed. 11. I acknowledge that my physician has explained sedation/analgesia administration to me including the risk and benefits I consent to the administration of sedation/analgesia as may be necessary or desirable in the judgment of my physician. I CERTIFY THAT I HAVE READ AND FULLY UNDERSTAND THE ABOVE CONSENT TO OPERATION and/or OTHER PROCEDURE.     _________________________________________ _________________________________     ___________________________________  Signature of Patient     Signature of Responsible Person                   Printed Name of Responsible Person                              _________________________________________ ______________________________        ___________________________________  Signature of Witness         Date  Time         Relationship to Patient    STATEMENT OF PHYSICIAN My signature below affirms that prior to the time of the procedure; I have explained to the patient and/or his/her legal representative, the risks and benefits involved in the proposed treatment and any reasonable alternative to the proposed treatment.  I have also explained the risks and benefits involved in refusal of the proposed treatment and alternatives to the proposed treatment and have answered the patient's questions.  If I have a significant financial interest in a co-management agreement or a significant financial interest in any product or implant, or other significant relationship used in this procedure/surgery, I have disclosed this and had a discussion with my patient.     _______________________________________________________________ _____________________________  Irving Mejia Physician)                                                                                         (Date)                                   (Time)  Patient Name: Apurva Gruber    : 1950   Printed: 10/30/2023      Medical Record #: [id number]                                              Page 1 of 1

## (undated) NOTE — MR AVS SNAPSHOT
[address]  [phone]               Thank you for choosing us for your health care visit with Ruth Sanders MD.  We are glad to serve you and happy to provide you with this summa Commonly known as:  TUMS           celecoxib 200 MG Caps   Take 1 capsule (200 mg total) by mouth daily. Commonly known as:  CELEBREX           L-TRYPTOPHAN OR   Take 1 tablet by mouth daily.            Melatonin 3 MG Caps   Take 2 capsules by mouth daily day (2.4 g sodium or 6 g sodium chloride)   Aerobic physical activity Regular aerobic physical activity (e.g., brisk walking, light jogging, cycling, swimming, etc.) for a goal of at least 150 minutes per week.    Moderation of alcohol consumption Men: Shena Pena

## (undated) NOTE — LETTER
No referring provider defined for this encounter. 04/11/19        Patient: Vazquez Jaramillo   YOB: 1950   Date of Visit: 4/11/2019       Dear  Dr. Jordyn Klein MD,      Thank you for referring Vazquez Jaramillo to my practice.   [facility] [facility] CARDIOLOGY  [address] [facility] [id number]    Document electronically generated by:  Alexsandra Palacio

## (undated) NOTE — MR AVS SNAPSHOT
[facility]  [address] [place] [address], [address] [place] [address]  [facility]               Thank you for choosing us for your health care visit with Diane Alvarez MD.  We are glad to serve you and happy to provide you with this summary of yo medications prescribed for you. Read the directions carefully, and ask your doctor or other care provider to review them with you. Scheduling Instructions     Monday January 23, 2017     Imaging:  CT CHEST (CPT=71250)    Instructions:   Rolo serrano

## (undated) NOTE — ED AVS SNAPSHOT
Ren Lawson   MRN: [id number]    Department:  [facility] Emergency Department   Date of Visit:  1/10/2019           Disclosure     Insurance plans vary and the physician(s) referred by the ER may not be covered by your plan.  Please c within the next three months to obtain basic health screening including reassessment of your blood pressure.     IF THERE IS ANY CHANGE OR WORSENING OF YOUR CONDITION, CALL YOUR PRIMARY CARE PHYSICIAN AT ONCE OR RETURN IMMEDIATELY TO THE EMERGENCY DEPARTMEN

## (undated) NOTE — LETTER
4/23/2019              [address] [place] [id number]         Dear Maggy Christy,    The results of the CTA performed on 4/22/19 shows normal coronaries. Please continue present management.    For any questions or concern

## (undated) NOTE — LETTER
5/19/2025    Breanna Mccarthy        [address] [place] [address], [address] [place] [address]            Dear Breanna Mccarthy,      Our records indicate that you are due for an appointment for a Colonoscopy with Hayder Bashir MD. Our doctors are booking out about 3-6 months in advance for procedures.     Please call our office to schedule this appointment.  Your medical well-being is important to us.    If your insurance requires a referral, please call your primary care office to request one.      Thank you,      The Physicians and Staff at [facility]

## (undated) NOTE — MR AVS SNAPSHOT
[facility]  [address] [place] [address], [address]  [id number] [place] [phone]               Thank you for choosing us for your health care visit with Sidra George MD.  We are glad to serve you and happy to provide you with this summary of y If you are confident that your benefit plan will not require a referral or authorization, such as [place] Medicaid, please feel free to schedule your appointment immediately.  However, if you are unsure about the requirements for authorization, please wait 120/68 mmHg 78 5' 1.5\" (1.562 m) 124 lb 9.6 oz (56.518 kg) 23.16 kg/m2         Current Medications          This list is accurate as of: 3/6/17  6:35 PM.  Always use your most recent med list.                ALOE VERA JUICE OR   Take by mouth. Summaries. If you've been to the Emergency Department or your doctor's office, you can view your past visit information in [facility] by going to Visits < Visit Summaries. [facility] questions? Call [phone] for help.   [facility] is NOT to be used for urge

## (undated) NOTE — LETTER
Barrie Graham, [address] [facility]  [place] [address] [address], [address]       11/15/17        Patient: Nathalia Perez   YOB: 1950   Date of Visit: 11/15/2017       Dear  Dr. Alexis Grady MD,      Thank you for referring Nathalia Perez to my practice.   I